# Patient Record
Sex: FEMALE | Race: WHITE | Employment: OTHER | URBAN - METROPOLITAN AREA
[De-identification: names, ages, dates, MRNs, and addresses within clinical notes are randomized per-mention and may not be internally consistent; named-entity substitution may affect disease eponyms.]

---

## 2017-06-14 ENCOUNTER — OFFICE VISIT (OUTPATIENT)
Dept: ONCOLOGY | Age: 72
End: 2017-06-14

## 2017-06-14 VITALS
DIASTOLIC BLOOD PRESSURE: 86 MMHG | TEMPERATURE: 97.7 F | HEART RATE: 89 BPM | RESPIRATION RATE: 18 BRPM | SYSTOLIC BLOOD PRESSURE: 133 MMHG | WEIGHT: 147 LBS | OXYGEN SATURATION: 100 % | BODY MASS INDEX: 24.49 KG/M2 | HEIGHT: 65 IN

## 2017-06-14 DIAGNOSIS — N39.0 URINARY TRACT INFECTION WITHOUT HEMATURIA, SITE UNSPECIFIED: ICD-10-CM

## 2017-06-14 DIAGNOSIS — N85.02 COMPLEX ATYPICAL ENDOMETRIAL HYPERPLASIA: Primary | ICD-10-CM

## 2017-06-14 NOTE — PROGRESS NOTES
[address], 1700 [facility], 2150 [facility]  [address] [facility], 5 [facility]   [phone]  [facility] Covert DO      Patient ID:  Name:  Litzy Beavers  MRN:  [id number]  :  1945/71 y.o. Date:  2017      HISTORY OF PRESENT ILLNESS:  Litzy Beavers is a 73 y.o.   postmenopausal female referred by Dr. Peace Crawford for [facility]. Patient had hysteroscopy, D&C, polypectomy 17 for incidental finding thickened endometrial stripe on TVUS, see report below. Currently on Macrobid for UTI. Patient has a history of kidney stones that began in her 45s. Patient reports abdominal bloating and constipation, daily stool softener use. Denies PMB, vaginal discharge or irritation. No pelvic pain. No history of abnormal paps. Labs:  Pathology: endometrial currettings CAH, see full report in media      Imaging    Ultrasound 2017  Uterus 6 x4x3  Endo thickness 15.14 mm  Lt ovary- 1.2 x0.9 x 1.3complex cyst with calcification    CT 17  Impression:    ABDOMEN:    1. Bilateral nephrolithiasis. There is mild prominence of the bilateral renal collecting systems, right greater than left. However, no delayed nephrogram or perinephric stranding. Calcification is seen at the level of the right mid pelvis, favored to be atherosclerotic within the proximal right internal iliac artery. No conclusive ureteral stone identified. The degree of the collecting system dilatation may be secondary to mild distention of the urinary bladder. Nonetheless, recently passed ureteral stone, particularly on the right cannot be entirely excluded. Correlation with symptomatology and urinalysis recommended. 2. Status post cholecystectomy. 3. Stable sub-centimeters low attenuation focus within the liver, favored to be a nonaggressive etiology. 4. Duodenal diverticulum.     PELVIS:    1. No active inflammatory change within the pelvis. 2. Moderate distention of urinary bladder. Result Narrative   Indication: RT FLANK PAIN; ; .    Comparison: Multiple studies dating back to 3/6/2013. Total Exam DLP: 364.19 mGy-cm.  Dose lowering technique was utilized using automated exposure control adjustment of mA and/or KV according to patient size and/or iterative reconstruction technique. Technique: Following the uneventful administration of 100 cc IV Omnipaque 350 nonionic contrast, 5 mm helical images were obtained from the lung bases through the pelvis. Coronal and sagittal reformats were provided by the CT technologist. No enteric contrast utilized. Findings:    Abdomen:     Lung bases: Clear. Visualized heart: Normal.    Pericardial space: Normal.    Visualized pleural spaces: Normal.    Visualized mediastinum: Normal.    Liver: Normal attenuation and morphology. There is an unchanged subcapsular low-attenuation focus measuring 10 mm within the right hepatic lobe, statistically likely to represent a benign entity. Gallbladder/Biliary system: Status post cholecystectomy. No intrahepatic nor extrahepatic ductal dilatation. Spleen: Normal.    Adrenal Glands: Normal.    Pancreas: Normal.    Kidneys: Symmetric enhancement. Bilateral renal calculi, right greater than left. Largest stone measures 6 mm at the level of the lower pole of the right kidney. There is prominence of the bilateral renal pelves, right greater than left with mild right-sided calyceal dilatation. However, no delayed nephrogram or perinephric inflammation. Mild prominence of the bilateral ureters, right greater than left. The distal ureters both appear normal in caliber. There is a transition point of the right ureter just at the level of the right sacral ala adjacent to the right common iliac bifurcation where there is associated atherosclerotic calcification. No conclusive stone is seen in this region.  No periureteral inflammatory change. Stomach/Small bowel: The stomach demonstrates a normal morphology. Incidental note is made of a diverticulum emanating from the 2nd portion of the duodenum with an associated air-fluid level, measuring at least 3.1 cm. There is no small bowel obstruction. Vasculature: There is mild atherosclerotic disease involving the abdominal aorta and iliac vasculature without evidence of aneurysmal dilatation. Peritoneum: No pneumoperitoneum, ascites or upper abdominal lymphadenopathy. Retroperitoneum: No retroperitoneal nor pelvic lymphadenopathy. No free pelvic fluid. Anterior Abdominal Wall: Left inguinal hernia containing fat. Otherwise, intact. Pelvis:    Urogenital: Urinary bladder is moderately distended. No wall thickening or pericystic inflammatory change. No urinary bladder calculi. Uterus is retroverted within the pelvis. No adnexal masses are seen. Appendix : The appendix is not confidently identified; however, there are no secondary signs to suggest an acute appendicitis. Colon: No acute findings. Cecum extends deep into the right hemipelvis.     Musculoskeletal: No suspicious lytic or blastic lesion of bone            ROS:   Allergic/Immunologic: seasonal allergies  Gastrointestinal: indigestion, heartburn, constipation  Genitourinary: frequent urination, UTI  Endocrine:  Hot flashes  All other systems reviewed and were negative    Patient Active Problem List    Diagnosis Date Noted    Right Flank Pain 03/12/2015    Chest pain at rest 06/19/2013    Calculus of kidney 11/08/2012     Past Medical History:   Diagnosis Date    Arthritis     Asthma     Cataract     Gall stones     GERD (gastroesophageal reflux disease)     Hay fever     Hyperlipidemia     Kidney stones     bi-lateral    Recurrent UTI     Sinusitis       Past Surgical History:   Procedure Laterality Date    HX APPENDECTOMY      HX CATARACT REMOVAL      HX CHOLECYSTECTOMY      HX KNEE ARTHROSCOPY      HX LITHOTRIPSY      HX ORTHOPAEDIC      HX OTHER SURGICAL      cystourethroscopy      OB History     No data available        Social History   Substance Use Topics    Smoking status: Never Smoker    Smokeless tobacco: Never Used    Alcohol use No      Family History   Problem Relation Age of Onset    Cancer Father     Hypertension Father     Heart Disease Mother       Current Outpatient Prescriptions   Medication Sig    estrogen, conjugated,-medroxyPROGESTERone (PREMPRO) 0.45-1.5 mg per tablet Take 1 Tab by mouth daily.  raNITIdine (ZANTAC) 150 mg tablet Take 150 mg by mouth two (2) times a day.  trimethoprim (TRIMPEX) 100 mg tablet Take 1 Tab by mouth two (2) times a day. Indications: KLEBSIELLA URINARY TRACT INFECTION    traMADol (ULTRAM) 50 mg tablet Take 50 mg by mouth every six (6) hours as needed for Pain.  amLODIPine (NORVASC) 2.5 mg tablet Take  by mouth daily.  aspirin delayed-release 81 mg tablet Take  by mouth daily.  butalbital-aspirin-caffeine (FIORINAL) capsule Take 1 Cap by mouth every four (4) hours as needed for Pain.  multivitamin (ONE A DAY) tablet Take 1 Tab by mouth daily.  Cetirizine 10 mg cap Take  by mouth.  fluorouracil (EFUDEX) 5 % chemo cream Apply  to affected area two (2) times a day.  valACYclovir (VALTREX) 500 mg tablet Take  by mouth two (2) times a day.  levocetirizine (XYZAL) 5 mg tablet Take  by mouth daily.  ergocalciferol (VITAMIN D2) 50,000 unit capsule Take 50,000 Units by mouth.  Dexlansoprazole (DEXILANT) 30 mg CpDM Take  by mouth.  dicyclomine (BENTYL) 20 mg tablet Take 20 mg by mouth every six (6) hours.  eszopiclone (LUNESTA) 2 mg tablet Take  by mouth nightly.  clonazePAM (KLONOPIN) 1 mg tablet Take  by mouth two (2) times a day.  simvastatin (ZOCOR) 40 mg tablet Take  by mouth nightly.  fluticasone (FLOVENT DISKUS) 50 mcg/actuation inhaler Take  by inhalation.        No current facility-administered medications for this visit. Allergies   Allergen Reactions    Acetaminophen Unable to Obtain    Amoxicillin Hives    Aspirin Hives    Cephalexin Hives    Ciprofloxacin Other (comments)     GI distress    Clavulanic Acid Unknown (comments)    Codeine Hives    Doxycycline Hives    Eryc [Erythromycin] Other (comments)     Gi ditress    Formaldehyde Unknown (comments)    Gold Sodium Thiomalate Unknown (comments)    Hydrocodone Unknown (comments)    Hydrocodone-Acetaminophen Unknown (comments)    Meperidine Hives    Neomycin Unknown (comments)    Nsaids (Non-Steroidal Anti-Inflammatory Drug) Unknown (comments)    Oxycodone Unknown (comments)    Penicillin G Hives    Penicillin G Potassium In D5w Unknown (comments)    Percocet [Oxycodone-Acetaminophen] Other (comments)     GI    Potassium Unknown (comments)    Prolia [Denosumab] Hives    Sulfa (Sulfonamide Antibiotics) Hives    Sulfanilamide Unknown (comments)          OBJECTIVE:    Physical Exam  VITAL SIGNS: There were no vitals taken for this visit. GENERAL JEFFREY: in no apparent distress and well developed and well nourished   MUSCULOSKEL: no joint tenderness, deformity or swelling   INTEGUMENT:  warm and dry, no rashes or lesions   ABDOMEN . soft, NT, ND, No masses appreciated   EXTREMITIES: extremities normal, atraumatic, no cyanosis or edema   PELVIC: Vulva and vagina appear normal. Bimanual exam reveals normal uterus and adnexa.    RECTAL: deferred   ANISH SURVEY: Cervical, supraclavicular, axillary and inguinal nodes normal.   NEURO: Grossly normal         IMPRESSION/PLAN:  Complex Atypical Hyperplasia   - reviewed pathology and risk of progression to endo ca of approx 30% if left untreated; also discussed at time of hysterectomy there is approximately 40% chance of finding occult malignancy   -given that and her age would recommend proceeding with hysterectomy/BSO with possible staging pending intraop path   -discussed laparoscopic approach and risks, benefits and alternatives of surgery discussed in detail    -informed consent signed and pre and postop instructions reviewed      The total time spent was 60 minutes regarding this patients diagnosis of CAH and >50% of this time was spent counseling and coordinating care    [address] [place] [address]  Gynecologic Oncology  3/38/111466:75 AM

## 2017-06-14 NOTE — MR AVS SNAPSHOT
Visit Information Date & Time Provider Department Dept. Phone Encounter #  
 6/14/2017 11:00 AM Dio Moncada MD [facility] Gynecologic Oncology Specialist at [facility] [phone] [id number] Your Appointments 8/24/2017  8:30 AM  
ESTABLISHED PATIENT with Tanesha Hooper MD  
[facility] ([facility]) Appt Note: 3 mo fu/rec uti  
 [address] [place] [phone]  
  
   
 [address] [place]  
  
    
 5/21/2018  8:45 AM  
ESTABLISHED PATIENT with Tanesha Hooper MD  
[facility] ([facility]) Appt Note: 1 yr f/u for Urinary tract infection with hematuria, site unspecified [address] [facility]  
[phone] Upcoming Health Maintenance Date Due Hepatitis C Screening 1945 DTaP/Tdap/Td series (1 - Tdap) 12/27/1968 FOBT Q 1 YEAR AGE 50-75 12/27/1997 ZOSTER VACCINE AGE 60> 12/27/2007 GLAUCOMA SCREENING Q2Y 12/27/2012 OSTEOPOROSIS SCREENING (DEXA) 12/27/2012 Pneumococcal 65+ Low/Medium Risk (1 of 2 - PCV13) 12/27/2012 MEDICARE YEARLY EXAM 12/27/2012 INFLUENZA AGE 9 TO ADULT 8/1/2017 BREAST CANCER SCRN MAMMOGRAM 11/28/2018 Allergies as of 6/14/2017  Review Complete On: 6/14/2017 By: Dio Moncada MD  
  
 Severity Noted Reaction Type Reactions Acetaminophen  03/02/2017    Unable to Obtain Amoxicillin  10/25/2012    Hives Aspirin  10/25/2012    Hives Cephalexin  10/25/2012    Hives Ciprofloxacin  10/25/2012    Other (comments) GI distress Clavulanic Acid  03/02/2017    Unknown (comments) Codeine  10/25/2012    Hives Doxycycline  03/12/2015    Hives Eryc [Erythromycin]  10/25/2012    Other (comments) Gi ditress Formaldehyde  03/02/2017    Unknown (comments) Gold Sodium Thiomalate  03/02/2017    Unknown (comments) Hydrocodone  03/02/2017    Unknown (comments) Hydrocodone-acetaminophen  03/02/2017    Unknown (comments) Meperidine  10/25/2012    Hives Neomycin  03/02/2017    Unknown (comments) Nsaids (Non-steroidal Anti-inflammatory Drug)  03/05/2013    Unknown (comments) Oxycodone  03/02/2017    Unknown (comments) Penicillin G  10/25/2012    Hives Penicillin G Potassium In D5w  03/02/2017    Unknown (comments) Percocet [Oxycodone-acetaminophen]  10/25/2012    Other (comments) GI Potassium  05/19/2017    Unknown (comments) Prolia [Denosumab]  03/12/2015    Hives Sulfa (Sulfonamide Antibiotics)  10/25/2012    Hives Sulfanilamide  03/02/2017    Unknown (comments) Current Immunizations  Never Reviewed No immunizations on file. Not reviewed this visit You Were Diagnosed With   
  
 Codes Comments Complex atypical endometrial hyperplasia    -  Primary ICD-10-CM: N85.02 
ICD-9-CM: 621.33 Urinary tract infection without hematuria, site unspecified     ICD-10-CM: N39.0 ICD-9-CM: 599.0 Vitals BP Pulse Temp Resp Height(growth percentile) Weight(growth percentile) 133/86 (BP 1 Location: Right arm, BP Patient Position: Sitting) 89 97.7 °F (36.5 °C) (Oral) 18 5' 5\" (1.651 m) 147 lb (66.7 kg) SpO2 BMI OB Status Smoking Status 100% 24.46 kg/m2 Postmenopausal Never Smoker BMI and BSA Data Body Mass Index Body Surface Area  
 24.46 kg/m 2 1.75 m 2 Preferred Pharmacy Pharmacy Name Phone [email or web address] [address] [phone] Your Updated Medication List  
  
   
This list is accurate as of: 6/14/17 12:04 PM.  Always use your most recent med list. amLODIPine 2.5 mg tablet Commonly known as:  Afshan Coop Take  by mouth daily. aspirin delayed-release 81 mg tablet Take  by mouth daily. BENTYL 20 mg tablet Generic drug:  dicyclomine Take 20 mg by mouth every six (6) hours. butalbital-aspirin-caffeine capsule Commonly known as:  Mono Shown Take 1 Cap by mouth every four (4) hours as needed for Pain. Cetirizine 10 mg Cap Take  by mouth. DEXILANT 30 mg capsule Generic drug:  dexlansoprazole Take  by mouth. fluorouracil 5 % chemo cream  
Commonly known as:  EFUDEX Apply  to affected area two (2) times a day. fluticasone 50 mcg/actuation inhaler Commonly known as:  FLOVENT DISKUS Take  by inhalation. KlonoPIN 1 mg tablet Generic drug:  clonazePAM  
Take  by mouth two (2) times a day. LUNESTA 2 mg tablet Generic drug:  eszopiclone Take  by mouth nightly. multivitamin tablet Commonly known as:  ONE A DAY Take 1 Tab by mouth daily. PREMPRO 0.45-1.5 mg per tablet Generic drug:  estrogen (conjugated)-medroxyPROGESTERone Take 1 Tab by mouth daily. trimethoprim 100 mg tablet Commonly known as:  TRIMPEX Take 1 Tab by mouth two (2) times a day. Indications: KLEBSIELLA URINARY TRACT INFECTION  
  
 valACYclovir 500 mg tablet Commonly known as:  VALTREX Take  by mouth two (2) times a day. VITAMIN D2 50,000 unit capsule Generic drug:  ergocalciferol Take 50,000 Units by mouth. XYZAL 5 mg tablet Generic drug:  levocetirizine Take  by mouth daily. ZANTAC 150 mg tablet Generic drug:  raNITIdine Take 150 mg by mouth two (2) times a day. ZOCOR 40 mg tablet Generic drug:  simvastatin Take  by mouth nightly. Introducing [facility]! [employer] introduces [employer] patient portal. Now you can access parts of your medical record, email your doctor's office, and request medication refills online. 1. In your internet browser, go to [email or web address] 2. Click on the First Time User? Click Here link in the Sign In box. You will see the New Member Sign Up page. 3. Enter your Re[employer] Access Code exactly as it appears below. You will not need to use this code after youve completed the sign-up process. If you do not sign up before the expiration date, you must request a new code. · [employer] Access Code: [email or web address] Expires: 8/14/2017  2:22 PM 
 
4. Enter the last four digits of your Social Security Number (xxxx) and Date of Birth (mm/dd/yyyy) as indicated and click Submit. You will be taken to the next sign-up page. 5. Create a [email or web address] ID. This will be your Re[employer]yes login ID and cannot be changed, so think of one that is secure and easy to remember. 6. Create a [employer] password. You can change your password at any time. 7. Enter your Password Reset Question and Answer. This can be used at a later time if you forget your password. 8. Enter your e-mail address. You will receive e-mail notification when new information is available in [address]. 9. Click Sign Up. You can now view and download portions of your medical record. 10. Click the Download Summary menu link to download a portable copy of your medical information. If you have questions, please visit the Frequently Asked Questions section of the [employer] website. Remember, [employer] is NOT to be used for urgent needs. For medical emergencies, dial 911. Now available from your iPhone and Android! Please provide this summary of care documentation to your next provider. Your primary care clinician is listed as Kathi Boyd. If you have any questions after today's visit, please call [phone].

## 2017-06-21 ENCOUNTER — HOSPITAL ENCOUNTER (OUTPATIENT)
Dept: PREADMISSION TESTING | Age: 72
Discharge: HOME | End: 2017-06-21
Payer: MEDICARE

## 2017-06-21 VITALS — BODY MASS INDEX: 23.95 KG/M2 | WEIGHT: 149 LBS | HEIGHT: 66 IN

## 2017-06-21 LAB
ABO + RH BLD: NORMAL
ALBUMIN SERPL BCP-MCNC: 3.6 G/DL (ref 3.4–5)
ALBUMIN/GLOB SERPL: 1.2 {RATIO} (ref 0.8–1.7)
ALP SERPL-CCNC: 129 U/L (ref 45–117)
ALT SERPL-CCNC: 27 U/L (ref 13–56)
ANION GAP BLD CALC-SCNC: 11 MMOL/L (ref 3–18)
AST SERPL W P-5'-P-CCNC: 15 U/L (ref 15–37)
BASOPHILS # BLD AUTO: 0 K/UL (ref 0–0.06)
BASOPHILS # BLD: 1 % (ref 0–2)
BILIRUB SERPL-MCNC: 0.4 MG/DL (ref 0.2–1)
BLOOD GROUP ANTIBODIES SERPL: NORMAL
BUN SERPL-MCNC: 12 MG/DL (ref 7–18)
BUN/CREAT SERPL: 19 (ref 12–20)
CALCIUM SERPL-MCNC: 9.7 MG/DL (ref 8.5–10.1)
CHLORIDE SERPL-SCNC: 106 MMOL/L (ref 100–108)
CO2 SERPL-SCNC: 27 MMOL/L (ref 21–32)
CREAT SERPL-MCNC: 0.64 MG/DL (ref 0.6–1.3)
DIFFERENTIAL METHOD BLD: ABNORMAL
EOSINOPHIL # BLD: 0.3 K/UL (ref 0–0.4)
EOSINOPHIL NFR BLD: 5 % (ref 0–5)
ERYTHROCYTE [DISTWIDTH] IN BLOOD BY AUTOMATED COUNT: 12.3 % (ref 11.6–14.5)
GLOBULIN SER CALC-MCNC: 3 G/DL (ref 2–4)
GLUCOSE SERPL-MCNC: 93 MG/DL (ref 74–99)
HCT VFR BLD AUTO: 37.1 % (ref 35–45)
HGB BLD-MCNC: 13 G/DL (ref 12–16)
LYMPHOCYTES # BLD AUTO: 20 % (ref 21–52)
LYMPHOCYTES # BLD: 1.2 K/UL (ref 0.9–3.6)
MCH RBC QN AUTO: 32.7 PG (ref 24–34)
MCHC RBC AUTO-ENTMCNC: 35 G/DL (ref 31–37)
MCV RBC AUTO: 93.2 FL (ref 74–97)
MONOCYTES # BLD: 0.6 K/UL (ref 0.05–1.2)
MONOCYTES NFR BLD AUTO: 10 % (ref 3–10)
NEUTS SEG # BLD: 3.8 K/UL (ref 1.8–8)
NEUTS SEG NFR BLD AUTO: 64 % (ref 40–73)
PLATELET # BLD AUTO: 249 K/UL (ref 135–420)
PMV BLD AUTO: 9.9 FL (ref 9.2–11.8)
POTASSIUM SERPL-SCNC: 3.6 MMOL/L (ref 3.5–5.5)
PROT SERPL-MCNC: 6.6 G/DL (ref 6.4–8.2)
RBC # BLD AUTO: 3.98 M/UL (ref 4.2–5.3)
SODIUM SERPL-SCNC: 144 MMOL/L (ref 136–145)
SPECIMEN EXP DATE BLD: NORMAL
WBC # BLD AUTO: 5.9 K/UL (ref 4.6–13.2)

## 2017-06-21 PROCEDURE — 86900 BLOOD TYPING SEROLOGIC ABO: CPT | Performed by: OBSTETRICS & GYNECOLOGY

## 2017-06-21 PROCEDURE — 85025 COMPLETE CBC W/AUTO DIFF WBC: CPT | Performed by: OBSTETRICS & GYNECOLOGY

## 2017-06-21 PROCEDURE — 80053 COMPREHEN METABOLIC PANEL: CPT | Performed by: OBSTETRICS & GYNECOLOGY

## 2017-06-21 RX ORDER — SODIUM CHLORIDE, SODIUM LACTATE, POTASSIUM CHLORIDE, CALCIUM CHLORIDE 600; 310; 30; 20 MG/100ML; MG/100ML; MG/100ML; MG/100ML
125 INJECTION, SOLUTION INTRAVENOUS CONTINUOUS
Status: CANCELLED | OUTPATIENT
Start: 2017-06-21

## 2017-06-21 RX ORDER — HEPARIN SODIUM 5000 [USP'U]/ML
5000 INJECTION, SOLUTION INTRAVENOUS; SUBCUTANEOUS ONCE
Status: CANCELLED | OUTPATIENT
Start: 2017-06-21 | End: 2017-06-21

## 2017-06-21 RX ORDER — FAMOTIDINE 20 MG/1
20 TABLET, FILM COATED ORAL AS NEEDED
COMMUNITY
End: 2017-08-24

## 2017-06-21 RX ORDER — MONTELUKAST SODIUM 10 MG/1
10 TABLET ORAL
COMMUNITY
End: 2018-03-16

## 2017-06-21 NOTE — PERIOP NOTES
Patient with multiple allergies. Dr. Jelani Carpenter office Danni Stovall) notified of neomycin allergy and Gentamycin contraindication. Misael Rubio will get back to us and call [phone].   EKG requested from [facility],

## 2017-06-22 ENCOUNTER — TELEPHONE (OUTPATIENT)
Dept: ONCOLOGY | Age: 72
End: 2017-06-22

## 2017-06-23 ENCOUNTER — TELEPHONE (OUTPATIENT)
Dept: ONCOLOGY | Age: 72
End: 2017-06-23

## 2017-06-23 RX ORDER — GENTAMICIN SULFATE 60 MG/50ML
120 INJECTION, SOLUTION INTRAVENOUS ONCE
Status: CANCELLED | OUTPATIENT
Start: 2017-06-23 | End: 2017-06-23

## 2017-06-23 NOTE — TELEPHONE ENCOUNTER
Returned page. Recent UTIs. Dr. Justine Fortune office sent urine culture. Feel fine today, yesterday didn't feel good, finished Macrobid finished Tuesday 6/20. Chills, no fever. Urine is clear today \"in the bowl\". Verified with Dr. Alanna parada to proceed with surgery Monday as scheduled. Pt encouraged to return call with any new or worsening sx's.

## 2017-06-25 ENCOUNTER — ANESTHESIA EVENT (OUTPATIENT)
Dept: SURGERY | Age: 72
End: 2017-06-25
Payer: MEDICARE

## 2017-06-26 ENCOUNTER — ANESTHESIA (OUTPATIENT)
Dept: SURGERY | Age: 72
End: 2017-06-26
Payer: MEDICARE

## 2017-06-26 ENCOUNTER — HOSPITAL ENCOUNTER (OUTPATIENT)
Age: 72
Setting detail: OBSERVATION
Discharge: HOME | End: 2017-06-27
Attending: OBSTETRICS & GYNECOLOGY | Admitting: OBSTETRICS & GYNECOLOGY
Payer: MEDICARE

## 2017-06-26 PROCEDURE — 77030020782 HC GWN BAIR PAWS FLX 3M -B: Performed by: OBSTETRICS & GYNECOLOGY

## 2017-06-26 PROCEDURE — 77030002996 HC SUT SLK J&J -A: Performed by: OBSTETRICS & GYNECOLOGY

## 2017-06-26 PROCEDURE — 76010000153 HC OR TIME 1.5 TO 2 HR: Performed by: OBSTETRICS & GYNECOLOGY

## 2017-06-26 PROCEDURE — 74011250636 HC RX REV CODE- 250/636: Performed by: OBSTETRICS & GYNECOLOGY

## 2017-06-26 PROCEDURE — 77030037241 HC PRT ACC BLDLSS AIRSEAL CNMD -B: Performed by: OBSTETRICS & GYNECOLOGY

## 2017-06-26 PROCEDURE — 74011250637 HC RX REV CODE- 250/637: Performed by: OBSTETRICS & GYNECOLOGY

## 2017-06-26 PROCEDURE — 74011000258 HC RX REV CODE- 258: Performed by: OBSTETRICS & GYNECOLOGY

## 2017-06-26 PROCEDURE — 77010033678 HC OXYGEN DAILY

## 2017-06-26 PROCEDURE — 74011000250 HC RX REV CODE- 250

## 2017-06-26 PROCEDURE — 74011250637 HC RX REV CODE- 250/637: Performed by: SPECIALIST

## 2017-06-26 PROCEDURE — 77030008683 HC TU ET CUF COVD -A: Performed by: NURSE ANESTHETIST, CERTIFIED REGISTERED

## 2017-06-26 PROCEDURE — 99218 HC RM OBSERVATION: CPT

## 2017-06-26 PROCEDURE — 74011250636 HC RX REV CODE- 250/636: Performed by: SPECIALIST

## 2017-06-26 PROCEDURE — 77030002966 HC SUT PDS J&J -A: Performed by: OBSTETRICS & GYNECOLOGY

## 2017-06-26 PROCEDURE — 77030016151 HC PROTCTR LNS DFOG COVD -B: Performed by: OBSTETRICS & GYNECOLOGY

## 2017-06-26 PROCEDURE — 74011250636 HC RX REV CODE- 250/636

## 2017-06-26 PROCEDURE — 77030031139 HC SUT VCRL2 J&J -A: Performed by: OBSTETRICS & GYNECOLOGY

## 2017-06-26 PROCEDURE — 77030029655 HC RUMI KOH EFF SYS F HARM COOP -B: Performed by: OBSTETRICS & GYNECOLOGY

## 2017-06-26 PROCEDURE — 88307 TISSUE EXAM BY PATHOLOGIST: CPT | Performed by: OBSTETRICS & GYNECOLOGY

## 2017-06-26 PROCEDURE — 88112 CYTOPATH CELL ENHANCE TECH: CPT | Performed by: OBSTETRICS & GYNECOLOGY

## 2017-06-26 PROCEDURE — 77030018835 HC SOL IRR LR ICUM -A: Performed by: OBSTETRICS & GYNECOLOGY

## 2017-06-26 PROCEDURE — 77030002935 HC SUT MCRYL J&J -C: Performed by: OBSTETRICS & GYNECOLOGY

## 2017-06-26 PROCEDURE — 88331 PATH CONSLTJ SURG 1 BLK 1SPC: CPT | Performed by: OBSTETRICS & GYNECOLOGY

## 2017-06-26 PROCEDURE — 77030012799 HC TRCR GELPRT BLN AMR -B: Performed by: OBSTETRICS & GYNECOLOGY

## 2017-06-26 PROCEDURE — 76060000034 HC ANESTHESIA 1.5 TO 2 HR: Performed by: OBSTETRICS & GYNECOLOGY

## 2017-06-26 PROCEDURE — 77030011640 HC PAD GRND REM COVD -A: Performed by: OBSTETRICS & GYNECOLOGY

## 2017-06-26 PROCEDURE — 74011000250 HC RX REV CODE- 250: Performed by: OBSTETRICS & GYNECOLOGY

## 2017-06-26 PROCEDURE — 77030008603 HC TRCR ENDOSC EPATH J&J -C: Performed by: OBSTETRICS & GYNECOLOGY

## 2017-06-26 PROCEDURE — 76210000006 HC OR PH I REC 0.5 TO 1 HR: Performed by: OBSTETRICS & GYNECOLOGY

## 2017-06-26 PROCEDURE — 77030008602 HC TRCR ENDOSC EPATH J&J -B: Performed by: OBSTETRICS & GYNECOLOGY

## 2017-06-26 PROCEDURE — 77030005521 HC CATH URETH FOL38 BARD -B: Performed by: OBSTETRICS & GYNECOLOGY

## 2017-06-26 PROCEDURE — 77030008477 HC STYL SATN SLP COVD -A: Performed by: NURSE ANESTHETIST, CERTIFIED REGISTERED

## 2017-06-26 PROCEDURE — 77030033639 HC SHR ENDO COAG HARM 36 J&J -E: Performed by: OBSTETRICS & GYNECOLOGY

## 2017-06-26 PROCEDURE — 77030014063 HC TIP MANIP UTER COOP -B: Performed by: OBSTETRICS & GYNECOLOGY

## 2017-06-26 RX ORDER — ENOXAPARIN SODIUM 100 MG/ML
40 INJECTION SUBCUTANEOUS EVERY 24 HOURS
Status: DISCONTINUED | OUTPATIENT
Start: 2017-06-27 | End: 2017-06-27 | Stop reason: HOSPADM

## 2017-06-26 RX ORDER — ONDANSETRON 2 MG/ML
4 INJECTION INTRAMUSCULAR; INTRAVENOUS ONCE
Status: DISCONTINUED | OUTPATIENT
Start: 2017-06-26 | End: 2017-06-26 | Stop reason: HOSPADM

## 2017-06-26 RX ORDER — CLONAZEPAM 0.5 MG/1
1 TABLET ORAL DAILY
Status: DISCONTINUED | OUTPATIENT
Start: 2017-06-27 | End: 2017-06-27 | Stop reason: HOSPADM

## 2017-06-26 RX ORDER — MAGNESIUM SULFATE 100 %
4 CRYSTALS MISCELLANEOUS AS NEEDED
Status: DISCONTINUED | OUTPATIENT
Start: 2017-06-26 | End: 2017-06-26 | Stop reason: HOSPADM

## 2017-06-26 RX ORDER — FENTANYL CITRATE 50 UG/ML
INJECTION, SOLUTION INTRAMUSCULAR; INTRAVENOUS AS NEEDED
Status: DISCONTINUED | OUTPATIENT
Start: 2017-06-26 | End: 2017-06-26 | Stop reason: HOSPADM

## 2017-06-26 RX ORDER — SCOLOPAMINE TRANSDERMAL SYSTEM 1 MG/1
1.5 PATCH, EXTENDED RELEASE TRANSDERMAL ONCE
Status: DISCONTINUED | OUTPATIENT
Start: 2017-06-26 | End: 2017-06-27 | Stop reason: HOSPADM

## 2017-06-26 RX ORDER — SODIUM CHLORIDE 0.9 % (FLUSH) 0.9 %
5-10 SYRINGE (ML) INJECTION EVERY 8 HOURS
Status: DISCONTINUED | OUTPATIENT
Start: 2017-06-26 | End: 2017-06-27 | Stop reason: HOSPADM

## 2017-06-26 RX ORDER — ONDANSETRON 2 MG/ML
4 INJECTION INTRAMUSCULAR; INTRAVENOUS
Status: DISCONTINUED | OUTPATIENT
Start: 2017-06-26 | End: 2017-06-27 | Stop reason: HOSPADM

## 2017-06-26 RX ORDER — NALOXONE HYDROCHLORIDE 0.4 MG/ML
0.4 INJECTION, SOLUTION INTRAMUSCULAR; INTRAVENOUS; SUBCUTANEOUS AS NEEDED
Status: DISCONTINUED | OUTPATIENT
Start: 2017-06-26 | End: 2017-06-27 | Stop reason: HOSPADM

## 2017-06-26 RX ORDER — AMLODIPINE BESYLATE 2.5 MG/1
2.5 TABLET ORAL
Status: DISCONTINUED | OUTPATIENT
Start: 2017-06-26 | End: 2017-06-27 | Stop reason: HOSPADM

## 2017-06-26 RX ORDER — DIPHENHYDRAMINE HCL 25 MG
25 CAPSULE ORAL
Status: DISCONTINUED | OUTPATIENT
Start: 2017-06-26 | End: 2017-06-27 | Stop reason: HOSPADM

## 2017-06-26 RX ORDER — DEXLANSOPRAZOLE 30 MG/1
30 CAPSULE, DELAYED RELEASE ORAL DAILY
Status: DISCONTINUED | OUTPATIENT
Start: 2017-06-27 | End: 2017-06-26 | Stop reason: CLARIF

## 2017-06-26 RX ORDER — ONDANSETRON 2 MG/ML
INJECTION INTRAMUSCULAR; INTRAVENOUS AS NEEDED
Status: DISCONTINUED | OUTPATIENT
Start: 2017-06-26 | End: 2017-06-26 | Stop reason: HOSPADM

## 2017-06-26 RX ORDER — GENTAMICIN SULFATE 60 MG/50ML
120 INJECTION, SOLUTION INTRAVENOUS EVERY 8 HOURS
Status: DISCONTINUED | OUTPATIENT
Start: 2017-06-26 | End: 2017-06-26 | Stop reason: ALTCHOICE

## 2017-06-26 RX ORDER — MONTELUKAST SODIUM 10 MG/1
10 TABLET ORAL DAILY
Status: DISCONTINUED | OUTPATIENT
Start: 2017-06-27 | End: 2017-06-27 | Stop reason: HOSPADM

## 2017-06-26 RX ORDER — HYDROMORPHONE HYDROCHLORIDE 2 MG/1
2-4 TABLET ORAL
Status: DISCONTINUED | OUTPATIENT
Start: 2017-06-26 | End: 2017-06-27 | Stop reason: HOSPADM

## 2017-06-26 RX ORDER — NEOSTIGMINE METHYLSULFATE 5 MG/5 ML
SYRINGE (ML) INTRAVENOUS AS NEEDED
Status: DISCONTINUED | OUTPATIENT
Start: 2017-06-26 | End: 2017-06-26 | Stop reason: HOSPADM

## 2017-06-26 RX ORDER — BUPIVACAINE HYDROCHLORIDE AND EPINEPHRINE 5; 5 MG/ML; UG/ML
INJECTION, SOLUTION PERINEURAL AS NEEDED
Status: DISCONTINUED | OUTPATIENT
Start: 2017-06-26 | End: 2017-06-26 | Stop reason: HOSPADM

## 2017-06-26 RX ORDER — PANTOPRAZOLE SODIUM 40 MG/1
40 TABLET, DELAYED RELEASE ORAL
Status: DISCONTINUED | OUTPATIENT
Start: 2017-06-27 | End: 2017-06-27 | Stop reason: HOSPADM

## 2017-06-26 RX ORDER — NALOXONE HYDROCHLORIDE 0.4 MG/ML
0.1 INJECTION, SOLUTION INTRAMUSCULAR; INTRAVENOUS; SUBCUTANEOUS AS NEEDED
Status: DISCONTINUED | OUTPATIENT
Start: 2017-06-26 | End: 2017-06-26 | Stop reason: HOSPADM

## 2017-06-26 RX ORDER — LORATADINE 10 MG/1
10 TABLET ORAL DAILY
Status: DISCONTINUED | OUTPATIENT
Start: 2017-06-27 | End: 2017-06-27 | Stop reason: HOSPADM

## 2017-06-26 RX ORDER — PROPOFOL 10 MG/ML
INJECTION, EMULSION INTRAVENOUS AS NEEDED
Status: DISCONTINUED | OUTPATIENT
Start: 2017-06-26 | End: 2017-06-26 | Stop reason: HOSPADM

## 2017-06-26 RX ORDER — SODIUM CHLORIDE 9 MG/ML
125 INJECTION, SOLUTION INTRAVENOUS CONTINUOUS
Status: DISCONTINUED | OUTPATIENT
Start: 2017-06-26 | End: 2017-06-26 | Stop reason: HOSPADM

## 2017-06-26 RX ORDER — MECLIZINE HCL 12.5 MG 12.5 MG/1
25 TABLET ORAL ONCE
Status: DISCONTINUED | OUTPATIENT
Start: 2017-06-26 | End: 2017-06-26

## 2017-06-26 RX ORDER — GLYCOPYRROLATE 0.2 MG/ML
INJECTION INTRAMUSCULAR; INTRAVENOUS AS NEEDED
Status: DISCONTINUED | OUTPATIENT
Start: 2017-06-26 | End: 2017-06-26 | Stop reason: HOSPADM

## 2017-06-26 RX ORDER — ROCURONIUM BROMIDE 10 MG/ML
INJECTION, SOLUTION INTRAVENOUS AS NEEDED
Status: DISCONTINUED | OUTPATIENT
Start: 2017-06-26 | End: 2017-06-26 | Stop reason: HOSPADM

## 2017-06-26 RX ORDER — ZOLPIDEM TARTRATE 5 MG/1
5 TABLET ORAL
Status: DISCONTINUED | OUTPATIENT
Start: 2017-06-26 | End: 2017-06-27 | Stop reason: HOSPADM

## 2017-06-26 RX ORDER — DEXAMETHASONE SODIUM PHOSPHATE 4 MG/ML
8 INJECTION, SOLUTION INTRA-ARTICULAR; INTRALESIONAL; INTRAMUSCULAR; INTRAVENOUS; SOFT TISSUE ONCE
Status: COMPLETED | OUTPATIENT
Start: 2017-06-26 | End: 2017-06-26

## 2017-06-26 RX ORDER — HEPARIN SODIUM 5000 [USP'U]/ML
5000 INJECTION, SOLUTION INTRAVENOUS; SUBCUTANEOUS ONCE
Status: COMPLETED | OUTPATIENT
Start: 2017-06-26 | End: 2017-06-26

## 2017-06-26 RX ORDER — OXYCODONE HYDROCHLORIDE 5 MG/1
5 TABLET ORAL
Status: DISCONTINUED | OUTPATIENT
Start: 2017-06-26 | End: 2017-06-26

## 2017-06-26 RX ORDER — SODIUM CHLORIDE 0.9 % (FLUSH) 0.9 %
5-10 SYRINGE (ML) INJECTION AS NEEDED
Status: DISCONTINUED | OUTPATIENT
Start: 2017-06-26 | End: 2017-06-27 | Stop reason: HOSPADM

## 2017-06-26 RX ORDER — SIMVASTATIN 40 MG/1
40 TABLET, FILM COATED ORAL
Status: DISCONTINUED | OUTPATIENT
Start: 2017-06-26 | End: 2017-06-27 | Stop reason: HOSPADM

## 2017-06-26 RX ORDER — SODIUM CHLORIDE 0.9 % (FLUSH) 0.9 %
5-10 SYRINGE (ML) INJECTION AS NEEDED
Status: DISCONTINUED | OUTPATIENT
Start: 2017-06-26 | End: 2017-06-26 | Stop reason: HOSPADM

## 2017-06-26 RX ORDER — DEXTROSE 50 % IN WATER (D50W) INTRAVENOUS SYRINGE
25-50 AS NEEDED
Status: DISCONTINUED | OUTPATIENT
Start: 2017-06-26 | End: 2017-06-26 | Stop reason: HOSPADM

## 2017-06-26 RX ORDER — FENTANYL CITRATE 50 UG/ML
25 INJECTION, SOLUTION INTRAMUSCULAR; INTRAVENOUS AS NEEDED
Status: DISCONTINUED | OUTPATIENT
Start: 2017-06-26 | End: 2017-06-26 | Stop reason: HOSPADM

## 2017-06-26 RX ORDER — SODIUM CHLORIDE, SODIUM LACTATE, POTASSIUM CHLORIDE, CALCIUM CHLORIDE 600; 310; 30; 20 MG/100ML; MG/100ML; MG/100ML; MG/100ML
125 INJECTION, SOLUTION INTRAVENOUS CONTINUOUS
Status: DISCONTINUED | OUTPATIENT
Start: 2017-06-26 | End: 2017-06-27

## 2017-06-26 RX ORDER — DICYCLOMINE HYDROCHLORIDE 10 MG/1
20 CAPSULE ORAL 2 TIMES DAILY
Status: DISCONTINUED | OUTPATIENT
Start: 2017-06-26 | End: 2017-06-27 | Stop reason: HOSPADM

## 2017-06-26 RX ORDER — MIDAZOLAM HYDROCHLORIDE 1 MG/ML
INJECTION, SOLUTION INTRAMUSCULAR; INTRAVENOUS AS NEEDED
Status: DISCONTINUED | OUTPATIENT
Start: 2017-06-26 | End: 2017-06-26 | Stop reason: HOSPADM

## 2017-06-26 RX ORDER — LIDOCAINE HYDROCHLORIDE 20 MG/ML
INJECTION, SOLUTION EPIDURAL; INFILTRATION; INTRACAUDAL; PERINEURAL AS NEEDED
Status: DISCONTINUED | OUTPATIENT
Start: 2017-06-26 | End: 2017-06-26 | Stop reason: HOSPADM

## 2017-06-26 RX ADMIN — Medication 3 MG: at 09:50

## 2017-06-26 RX ADMIN — DEXAMETHASONE SODIUM PHOSPHATE 8 MG: 4 INJECTION, SOLUTION INTRAMUSCULAR; INTRAVENOUS at 08:07

## 2017-06-26 RX ADMIN — SODIUM CHLORIDE, SODIUM LACTATE, POTASSIUM CHLORIDE, AND CALCIUM CHLORIDE 1000 ML: 600; 310; 30; 20 INJECTION, SOLUTION INTRAVENOUS at 07:36

## 2017-06-26 RX ADMIN — HEPARIN SODIUM 5000 UNITS: 5000 INJECTION, SOLUTION INTRAVENOUS; SUBCUTANEOUS at 07:36

## 2017-06-26 RX ADMIN — DICYCLOMINE HYDROCHLORIDE 20 MG: 10 CAPSULE ORAL at 21:07

## 2017-06-26 RX ADMIN — ONDANSETRON 4 MG: 2 INJECTION INTRAMUSCULAR; INTRAVENOUS at 09:47

## 2017-06-26 RX ADMIN — PROPOFOL 150 MG: 10 INJECTION, EMULSION INTRAVENOUS at 08:21

## 2017-06-26 RX ADMIN — FENTANYL CITRATE 25 MCG: 50 INJECTION, SOLUTION INTRAMUSCULAR; INTRAVENOUS at 10:41

## 2017-06-26 RX ADMIN — GLYCOPYRROLATE 0.6 MG: 0.2 INJECTION INTRAMUSCULAR; INTRAVENOUS at 09:50

## 2017-06-26 RX ADMIN — ONDANSETRON 4 MG: 2 INJECTION INTRAMUSCULAR; INTRAVENOUS at 15:37

## 2017-06-26 RX ADMIN — SIMVASTATIN 40 MG: 40 TABLET, FILM COATED ORAL at 21:06

## 2017-06-26 RX ADMIN — LIDOCAINE HYDROCHLORIDE 100 MG: 20 INJECTION, SOLUTION EPIDURAL; INFILTRATION; INTRACAUDAL; PERINEURAL at 08:21

## 2017-06-26 RX ADMIN — FENTANYL CITRATE 50 MCG: 50 INJECTION, SOLUTION INTRAMUSCULAR; INTRAVENOUS at 10:11

## 2017-06-26 RX ADMIN — SODIUM CHLORIDE, SODIUM LACTATE, POTASSIUM CHLORIDE, AND CALCIUM CHLORIDE 125 ML/HR: 600; 310; 30; 20 INJECTION, SOLUTION INTRAVENOUS at 16:17

## 2017-06-26 RX ADMIN — HYDROMORPHONE HYDROCHLORIDE 4 MG: 2 TABLET ORAL at 18:43

## 2017-06-26 RX ADMIN — FENTANYL CITRATE 100 MCG: 50 INJECTION, SOLUTION INTRAMUSCULAR; INTRAVENOUS at 08:21

## 2017-06-26 RX ADMIN — ROCURONIUM BROMIDE 50 MG: 10 INJECTION, SOLUTION INTRAVENOUS at 08:21

## 2017-06-26 RX ADMIN — MIDAZOLAM HYDROCHLORIDE 2 MG: 1 INJECTION, SOLUTION INTRAMUSCULAR; INTRAVENOUS at 08:14

## 2017-06-26 RX ADMIN — FENTANYL CITRATE 25 MCG: 50 INJECTION, SOLUTION INTRAMUSCULAR; INTRAVENOUS at 10:46

## 2017-06-26 RX ADMIN — CLINDAMYCIN PHOSPHATE 900 MG: 150 INJECTION, SOLUTION INTRAVENOUS at 16:34

## 2017-06-26 RX ADMIN — ZOLPIDEM TARTRATE 5 MG: 5 TABLET ORAL at 21:06

## 2017-06-26 RX ADMIN — SODIUM CHLORIDE 900 MG: 900 INJECTION, SOLUTION INTRAVENOUS at 08:34

## 2017-06-26 RX ADMIN — AMLODIPINE BESYLATE 2.5 MG: 2.5 TABLET ORAL at 21:06

## 2017-06-26 RX ADMIN — SODIUM CHLORIDE, SODIUM LACTATE, POTASSIUM CHLORIDE, AND CALCIUM CHLORIDE: 600; 310; 30; 20 INJECTION, SOLUTION INTRAVENOUS at 08:12

## 2017-06-26 RX ADMIN — GENTAMICIN SULFATE 305 MG: 40 INJECTION, SOLUTION INTRAMUSCULAR; INTRAVENOUS at 08:58

## 2017-06-26 RX ADMIN — FENTANYL CITRATE 50 MCG: 50 INJECTION, SOLUTION INTRAMUSCULAR; INTRAVENOUS at 09:55

## 2017-06-26 RX ADMIN — Medication 10 ML: at 21:09

## 2017-06-26 RX ADMIN — SODIUM CHLORIDE, SODIUM LACTATE, POTASSIUM CHLORIDE, AND CALCIUM CHLORIDE 125 ML/HR: 600; 310; 30; 20 INJECTION, SOLUTION INTRAVENOUS at 07:36

## 2017-06-26 NOTE — PERIOP NOTES
TRANSFER - OUT REPORT:    Verbal report given to [facility], RN on Migue Villagran  being transferred to [facility] (unit) for routine post - op       Report consisted of patients Situation, Background, Assessment and   Recommendations(SBAR). Information from the following report(s) SBAR, Intake/Output and MAR was reviewed with the receiving nurse. Lines:   Peripheral IV 06/26/17 Right Hand (Active)   Site Assessment Clean, dry, & intact 6/26/2017 10:22 AM   Phlebitis Assessment 0 6/26/2017 10:22 AM   Infiltration Assessment 0 6/26/2017 10:22 AM   Dressing Status Clean, dry, & intact 6/26/2017 10:22 AM   Dressing Type Tape;Transparent 6/26/2017 10:22 AM   Hub Color/Line Status Infusing 6/26/2017 10:22 AM        Opportunity for questions and clarification was provided.       Patient transported with:   Registered Nurse

## 2017-06-26 NOTE — ANESTHESIA POSTPROCEDURE EVALUATION
Post-Anesthesia Evaluation and Assessment    Cardiovascular Function/Vital Signs  Visit Vitals    /65    Pulse 75    Temp 36.1 °C (97 °F)    Resp 11    Ht 5' 5\" (1.651 m)    Wt 67.1 kg (148 lb)    SpO2 100%    BMI 24.63 kg/m2       Patient is status post Procedure(s):  TOTAL LAPAROSCOPIC HYSTERECTOMY,BILATERAL SALPINGO OOPHORECTOMY, LYSIS OF ADHESIONS, FROZEN PATHOLOGIC EVALUATION. Nausea/Vomiting: Controlled. Postoperative hydration reviewed and adequate. Pain:  Pain Scale 1: Numeric (0 - 10) (06/26/17 1057)  Pain Intensity 1: 0 (06/26/17 1057)   Managed. Neurological Status:   Neuro (WDL): Within Defined Limits (06/26/17 1049)   At baseline. Mental Status and Level of Consciousness: Arousable. Pulmonary Status:   O2 Device: Nasal cannula (06/26/17 1054)   Adequate oxygenation and airway patent. Complications related to anesthesia: None    Post-anesthesia assessment completed. No concerns. Patient has met all discharge requirements.     Signed By: Herminia Shah MD    June 26, 2017

## 2017-06-26 NOTE — H&P (VIEW-ONLY)
[address] [facility] [place] [facility]  [address] [place] [address], [address], [address] [facility]  [address] [facility], [place], [address]  [place], [address]  [phone]  Angela Maciel DO      Patient ID:  Name:  Tyler Junior  MRN:  [id number]  :  1945/71 y.o. Date:  2017      HISTORY OF PRESENT ILLNESS:  Tyler Junior is a 73 y.o.   postmenopausal female referred by Dr. Edwardo Frederick for [facility]. Patient had hysteroscopy, D&C, polypectomy 17 for incidental finding thickened endometrial stripe on TVUS, see report below. Currently on Macrobid for UTI. Patient has a history of kidney stones that began in her 45s. Patient reports abdominal bloating and constipation, daily stool softener use. Denies PMB, vaginal discharge or irritation. No pelvic pain. No history of abnormal paps. Labs:  Pathology: endometrial currettings CAH, see full report in media      Imaging    Ultrasound 2017  Uterus 6 x4x3  Endo thickness 15.14 mm  Lt ovary- 1.2 x0.9 x 1.3complex cyst with calcification    CT 17  Impression:    ABDOMEN:    1. Bilateral nephrolithiasis. There is mild prominence of the bilateral renal collecting systems, right greater than left. However, no delayed nephrogram or perinephric stranding. Calcification is seen at the level of the right mid pelvis, favored to be atherosclerotic within the proximal right internal iliac artery. No conclusive ureteral stone identified. The degree of the collecting system dilatation may be secondary to mild distention of the urinary bladder. Nonetheless, recently passed ureteral stone, particularly on the right cannot be entirely excluded. Correlation with symptomatology and urinalysis recommended. 2. Status post cholecystectomy. 3. Stable sub-centimeters low attenuation focus within the liver, favored to be a nonaggressive etiology. 4. Duodenal diverticulum.     PELVIS:    1. No active inflammatory change within the pelvis. 2. Moderate distention of urinary bladder. Result Narrative   Indication: RT FLANK PAIN; ; .    Comparison: Multiple studies dating back to 3/6/2013. Total Exam DLP: 364.19 mGy-cm.  Dose lowering technique was utilized using automated exposure control adjustment of mA and/or KV according to patient size and/or iterative reconstruction technique. Technique: Following the uneventful administration of 100 cc IV Omnipaque 350 nonionic contrast, 5 mm helical images were obtained from the lung bases through the pelvis. Coronal and sagittal reformats were provided by the CT technologist. No enteric contrast utilized. Findings:    Abdomen:     Lung bases: Clear. Visualized heart: Normal.    Pericardial space: Normal.    Visualized pleural spaces: Normal.    Visualized mediastinum: Normal.    Liver: Normal attenuation and morphology. There is an unchanged subcapsular low-attenuation focus measuring 10 mm within the right hepatic lobe, statistically likely to represent a benign entity. Gallbladder/Biliary system: Status post cholecystectomy. No intrahepatic nor extrahepatic ductal dilatation. Spleen: Normal.    Adrenal Glands: Normal.    Pancreas: Normal.    Kidneys: Symmetric enhancement. Bilateral renal calculi, right greater than left. Largest stone measures 6 mm at the level of the lower pole of the right kidney. There is prominence of the bilateral renal pelves, right greater than left with mild right-sided calyceal dilatation. However, no delayed nephrogram or perinephric inflammation. Mild prominence of the bilateral ureters, right greater than left. The distal ureters both appear normal in caliber. There is a transition point of the right ureter just at the level of the right sacral ala adjacent to the right common iliac bifurcation where there is associated atherosclerotic calcification. No conclusive stone is seen in this region.  No periureteral inflammatory change. Stomach/Small bowel: The stomach demonstrates a normal morphology. Incidental note is made of a diverticulum emanating from the 2nd portion of the duodenum with an associated air-fluid level, measuring at least 3.1 cm. There is no small bowel obstruction. Vasculature: There is mild atherosclerotic disease involving the abdominal aorta and iliac vasculature without evidence of aneurysmal dilatation. Peritoneum: No pneumoperitoneum, ascites or upper abdominal lymphadenopathy. Retroperitoneum: No retroperitoneal nor pelvic lymphadenopathy. No free pelvic fluid. Anterior Abdominal Wall: Left inguinal hernia containing fat. Otherwise, intact. Pelvis:    Urogenital: Urinary bladder is moderately distended. No wall thickening or pericystic inflammatory change. No urinary bladder calculi. Uterus is retroverted within the pelvis. No adnexal masses are seen. Appendix : The appendix is not confidently identified; however, there are no secondary signs to suggest an acute appendicitis. Colon: No acute findings. Cecum extends deep into the right hemipelvis.     Musculoskeletal: No suspicious lytic or blastic lesion of bone            ROS:   Allergic/Immunologic: seasonal allergies  Gastrointestinal: indigestion, heartburn, constipation  Genitourinary: frequent urination, UTI  Endocrine:  Hot flashes  All other systems reviewed and were negative    Patient Active Problem List    Diagnosis Date Noted    Right Flank Pain 03/12/2015    Chest pain at rest 06/19/2013    Calculus of kidney 11/08/2012     Past Medical History:   Diagnosis Date    Arthritis     Asthma     Cataract     Gall stones     GERD (gastroesophageal reflux disease)     Hay fever     Hyperlipidemia     Kidney stones     bi-lateral    Recurrent UTI     Sinusitis       Past Surgical History:   Procedure Laterality Date    HX APPENDECTOMY      HX CATARACT REMOVAL      HX CHOLECYSTECTOMY      HX KNEE ARTHROSCOPY      HX LITHOTRIPSY      HX ORTHOPAEDIC      HX OTHER SURGICAL      cystourethroscopy      OB History     No data available        Social History   Substance Use Topics    Smoking status: Never Smoker    Smokeless tobacco: Never Used    Alcohol use No      Family History   Problem Relation Age of Onset    Cancer Father     Hypertension Father     Heart Disease Mother       Current Outpatient Prescriptions   Medication Sig    estrogen, conjugated,-medroxyPROGESTERone (PREMPRO) 0.45-1.5 mg per tablet Take 1 Tab by mouth daily.  raNITIdine (ZANTAC) 150 mg tablet Take 150 mg by mouth two (2) times a day.  trimethoprim (TRIMPEX) 100 mg tablet Take 1 Tab by mouth two (2) times a day. Indications: KLEBSIELLA URINARY TRACT INFECTION    traMADol (ULTRAM) 50 mg tablet Take 50 mg by mouth every six (6) hours as needed for Pain.  amLODIPine (NORVASC) 2.5 mg tablet Take  by mouth daily.  aspirin delayed-release 81 mg tablet Take  by mouth daily.  butalbital-aspirin-caffeine (FIORINAL) capsule Take 1 Cap by mouth every four (4) hours as needed for Pain.  multivitamin (ONE A DAY) tablet Take 1 Tab by mouth daily.  Cetirizine 10 mg cap Take  by mouth.  fluorouracil (EFUDEX) 5 % chemo cream Apply  to affected area two (2) times a day.  valACYclovir (VALTREX) 500 mg tablet Take  by mouth two (2) times a day.  levocetirizine (XYZAL) 5 mg tablet Take  by mouth daily.  ergocalciferol (VITAMIN D2) 50,000 unit capsule Take 50,000 Units by mouth.  Dexlansoprazole (DEXILANT) 30 mg CpDM Take  by mouth.  dicyclomine (BENTYL) 20 mg tablet Take 20 mg by mouth every six (6) hours.  eszopiclone (LUNESTA) 2 mg tablet Take  by mouth nightly.  clonazePAM (KLONOPIN) 1 mg tablet Take  by mouth two (2) times a day.  simvastatin (ZOCOR) 40 mg tablet Take  by mouth nightly.  fluticasone (FLOVENT DISKUS) 50 mcg/actuation inhaler Take  by inhalation.        No current facility-administered medications for this visit. Allergies   Allergen Reactions    Acetaminophen Unable to Obtain    Amoxicillin Hives    Aspirin Hives    Cephalexin Hives    Ciprofloxacin Other (comments)     GI distress    Clavulanic Acid Unknown (comments)    Codeine Hives    Doxycycline Hives    Eryc [Erythromycin] Other (comments)     Gi ditress    Formaldehyde Unknown (comments)    Gold Sodium Thiomalate Unknown (comments)    Hydrocodone Unknown (comments)    Hydrocodone-Acetaminophen Unknown (comments)    Meperidine Hives    Neomycin Unknown (comments)    Nsaids (Non-Steroidal Anti-Inflammatory Drug) Unknown (comments)    Oxycodone Unknown (comments)    Penicillin G Hives    Penicillin G Potassium In D5w Unknown (comments)    Percocet [Oxycodone-Acetaminophen] Other (comments)     GI    Potassium Unknown (comments)    Prolia [Denosumab] Hives    Sulfa (Sulfonamide Antibiotics) Hives    Sulfanilamide Unknown (comments)          OBJECTIVE:    Physical Exam  VITAL SIGNS: There were no vitals taken for this visit. GENERAL JEFFREY: in no apparent distress and well developed and well nourished   MUSCULOSKEL: no joint tenderness, deformity or swelling   INTEGUMENT:  warm and dry, no rashes or lesions   ABDOMEN . soft, NT, ND, No masses appreciated   EXTREMITIES: extremities normal, atraumatic, no cyanosis or edema   PELVIC: Vulva and vagina appear normal. Bimanual exam reveals normal uterus and adnexa.    RECTAL: deferred   ANISH SURVEY: Cervical, supraclavicular, axillary and inguinal nodes normal.   NEURO: Grossly normal         IMPRESSION/PLAN:  Complex Atypical Hyperplasia   - reviewed pathology and risk of progression to endo ca of approx 30% if left untreated; also discussed at time of hysterectomy there is approximately 40% chance of finding occult malignancy   -given that and her age would recommend proceeding with hysterectomy/BSO with possible staging pending intraop path   -discussed laparoscopic approach and risks, benefits and alternatives of surgery discussed in detail    -informed consent signed and pre and postop instructions reviewed      The total time spent was 60 minutes regarding this patients diagnosis of CAH and >50% of this time was spent counseling and coordinating care    [address] [place] [address]  Gynecologic Oncology  6/84/373624:51 AM

## 2017-06-26 NOTE — OP NOTES
[address] [place] [address]  OPERATIVE REPORT    Name:  Saad Avila  MR#:  [id number]  :  1945  Account #:  [de-identified]  Date of Adm:  2017  Date of Surgery:  2017      PREOPERATIVE DIAGNOSIS: Complex atypical hyperplasia. POSTOPERATIVE DIAGNOSIS: Complex atypical hyperplasia. PROCEDURES PERFORMED: Total laparoscopic hysterectomy,  bilateral salpingo-oophorectomy, lysis of adhesions. SURGEON: Isadora Coulter MD    ASSISTANT: Lalo Garcia    ESTIMATED BLOOD LOSS: 25 mL. SPECIMENS REMOVED: Uterus, cervix, bilateral tubes and ovaries  and washings. ANESTHESIA: General.    FLUIDS: 1200 mL crystalloid. URINE OUTPUT: 55 mL. ESTIMATED BLOOD LOSS: 25 mL. COMPLICATIONS: None. FINDINGS: Laparoscopic findings revealed some adhesions of her  appendectomy stump to the anterior uterine and bladder peritoneum,  as well as some adhesions of the omentum to the anterior abdominal  wall at the umbilicus, extending to the right side. INDICATION: The patient is a 72-year-old who had an incidental  finding of a thickened endometrium on transvaginal ultrasound. She  was then seen by Dr. Ryan Wan and underwent a hysteroscopy, D and C,  polypectomy, consistent with complex atypical hyperplasia. She  presents today for definitive surgical management. DESCRIPTION OF PROCEDURE: The patient was taken to the  operating room where general anesthesia was obtained without  difficulty. She was placed in dorsal lithotomy position, prepped and  draped in normal sterile fashion. A surgical time-out was taken by the  entire surgical team. A Jasso catheter was placed. A sterile speculum  was then placed in the patient's vagina. The anterior lip of the cervix  grasped with single-tooth tenaculum. The cervix was then dilated and  an 8 cm JORGE tip with a 3.0 cm KOH cup was advanced without  difficulty.  Attention was then turned to the abdomen where an incision  was made above the umbilicus and carried down to underlying fascia. The fascia was then grasped with Kocher clamps and incised using  Dickson scissors. A 0 Vicryl stay suture was placed. Intraabdominal entry  was obtained using blunt dissection. The Jerrod trocar was then  advanced and pneumoperitoneum was obtained to 15 mmHg. At this  point, there appeared to be some adhesions of the omentum to the  anterior abdominal wall at the umbilicus and extending to the right. Therefore, a 5 mm trocar was placed in the left side under direct  visualization. A 5 mm laparoscope was then introduced through this  port and allowed us to visualize the right side of the abdomen. Therefore, a 5 mm trocar was placed in the right side of the abdomen  under direct visualization and through this trocar, the Harmonic scalpel  was advanced and lysis of adhesions was performed to mobilize the  omentum off the anterior abdominal wall up to the level where we were  able to place our second 5 mm trocar on the right side of the abdomen  under direct visualization. At this point, the laparoscope was switched  back to our 10 mm through the umbilical port. The patient was placed  in steep Trendelenburg. At this point, there appeared to be some  adhesions of the cecum to the anterior uterine and bladder peritoneum. This was taken down using the Harmonic scalpel which appeared to be  her prior appendectomy stump. Washings were obtained. Attention  was then turned to the right round ligament which was grasped and  transected using a Harmonic scalpel and this peritoneal incision was  extended superior along the infundibulopelvic ligament. The right  retroperitoneal space was opened. The ureter was seen peristalsing  below. A window above the ureter was made in the peritoneum and the  IP ligament was skeletonized, sealed and divided. This adnexa was  elevated and posteromedial leaf of the broad ligament was incised  towards the KOH cup.  This was then repeated on the left side in the  same fashion without difficulty. Next, the uterus was retroverted and  the anterior vesicouterine peritoneum was identified and incised along  the lower uterine segment. The bladder flap was then created using  both blunt and sharp dissection. The uterine arteries were then  skeletonized bilaterally, sealed and divided. The cardinal ligaments  were then sealed and divided. An anterior colpotomy was then made  and this was carried circumferentially around the KOH cup. The  specimen was pulled through the vagina and sent for frozen section  consistent with hyperplasia. At this point, the vaginal cuff was closed  using 2-0 PDS in a running 2-layer fashion. The pelvis was irrigated  copiously. Attention was then turned back to the cecum where a 3-0  silk interrupted stitch was placed to oversew the appendectomy stump. Next, the pelvis was once again irrigated copiously. There was good  hemostasis. At this point, all instruments were removed, the trocars  were removed and pneumoperitoneum was relieved. This umbilical  fascial incision was reapproximated with 0 Vicryl figure-of-eight stitch  x3. Skin sites were closed with 4-0 Monocryl. Marcaine 0.5% was  injected at the conclusion. The patient tolerated the procedure well. Sponge and needle count correct x2 and the patient was taken to the  recovery room in stable condition.         MD Leland Martinez / Mandie Pitts  D:  06/26/2017   10:05  T:  06/26/2017   16:51  Job #:  [id number]

## 2017-06-26 NOTE — PERIOP NOTES
Asking for ice chips pain is well controlled. Abdomen clean dry and intact costa patent was emptied and recorded.

## 2017-06-26 NOTE — ANESTHESIA PREPROCEDURE EVALUATION
Anesthetic History     PONV          Review of Systems / Medical History  Patient summary reviewed, nursing notes reviewed and pertinent labs reviewed    Pulmonary              Pertinent negatives: No COPD, asthma (h/o in the past) and smoker     Neuro/Psych         Psychiatric history     Cardiovascular    Hypertension            Pertinent negatives: No past MI and CAD       GI/Hepatic/Renal     GERD: well controlled    Renal disease: stones    Pertinent negatives: No liver disease   Endo/Other      Hypothyroidism  Arthritis  Pertinent negatives: No diabetes   Other Findings   Comments: -etoh         Physical Exam    Airway  Mallampati: IV  TM Distance: < 4 cm  Neck ROM: decreased range of motion   Mouth opening: Diminished (comment)    Comments: 2 cm T_M Cardiovascular               Dental    Dentition: Caps/crowns     Pulmonary                 Abdominal         Other Findings            Anesthetic Plan    ASA: 3  Anesthesia type: general          Induction: Intravenous  Anesthetic plan and risks discussed with: Patient      Probably interesting intubation

## 2017-06-26 NOTE — INTERVAL H&P NOTE
H&P Update:History and physical has been reviewed. The patient has been examined. There have been no significant clinical changes since the completion of the originally dated History and Sheldon Gregory was seen and examined. History and physical has been reviewed. The patient has been examined.  There have been no significant clinical changes since the completion of the originally dated History and Physical.    Signed By: Marybel Zarate MD     June 26, 2017 8:03 AM

## 2017-06-26 NOTE — PROGRESS NOTES
conducted an initial consultation and Spiritual Assessment for Abhilash oRman, who is a 73 y.o.,female. Patients Primary Language is: [place]. According to the patients EMR Gnosticist Affiliation is: [place].     The reason the Patient came to the hospital is:   Patient Active Problem List    Diagnosis Date Noted    Right Flank Pain 03/12/2015    Chest pain at rest 06/19/2013    Calculus of kidney 11/08/2012        The  provided the following Interventions:  Initiated a relationship of care and support. Explored issues of tru, belief, spirituality and Nondenominational/ritual needs while hospitalized. Listened empathically. Provided chaplaincy education. Provided information about Spiritual Care Services. Offered prayer and assurance of continued prayers on patients behalf. Chart reviewed. The following outcomes were achieved:  Patient shared limited information about both their medical narrative and spiritual journey/beliefs. Patient processed feeling about current hospitalization. Patient expressed gratitude for pastoral care visit. Assessment:  Patient does not have any Nondenominational/cultural needs that will affect patients preferences in health care. There are no further spiritual or Nondenominational issues which require intervention at this time. Plan:  Chaplains will continue to follow and will provide pastoral care on an as needed/requested basis.  recommends bedside caregivers page  on duty if patient shows signs of acute spiritual or emotional distress.       Sister Korin Chu [place], [address] [phone]

## 2017-06-26 NOTE — IP AVS SNAPSHOT
Deborah Durant 
 
 
 [address] [address] 
[phone] Patient: Uday Luz MRN: [id number] :1945 You are allergic to the following Allergen Reactions Acetaminophen Unable to Obtain Amoxicillin Hives Aspirin Hives Cephalexin Hives Nausea and Vomiting Ciprofloxacin Other (comments) GI distress Clavulanic Acid Unknown (comments) Codeine Hives Doxycycline Hives Eryc (Erythromycin) Other (comments) Gi ditress Formaldehyde Unknown (comments) Gold Sodium Thiomalate Unknown (comments) Hydrocodone Unknown (comments) Hydrocodone-Acetaminophen Rash Meperidine Hives Neomycin Other (comments) Nsaids (Non-Steroidal Anti-Inflammatory Drug) Unknown (comments) Oxycodone Unknown (comments) Penicillin G Hives Penicillin G Potassium In D5w Unknown (comments) Percocet (Oxycodone-Acetaminophen) Other (comments) GI Potassium Unknown (comments) Prolia (Denosumab) Hives Sulfa (Sulfonamide Antibiotics) Hives Sulfanilamide Unknown (comments) Recent Documentation Height Weight Breastfeeding? BMI OB Status Smoking Status 1.651 m 67.1 kg No 24.63 kg/m2 Postmenopausal Never Smoker Emergency Contacts Name Discharge Info Relation Home Work Mobile Vicente Samuel DISCHARGE CAREGIVER [3] Spouse [3] [phone] About your hospitalization You were admitted on:  2017 You last received care in the:  [address] [place] [address] You were discharged on:  2017 Unit phone number:  [phone] Why you were hospitalized Your primary diagnosis was:  Not on File Providers Seen During Your Hospitalizations Provider Role Specialty Primary office phone Arian Moreira MD Attending Provider Gynecologic Oncology [phone] Your Primary Care Physician (PCP) Primary Care Physician Office Phone Office Fax 1 [facility], [address] [phone] Follow-up Information Follow up With Details Comments Contact Info Svetlana Arguello MD On 6/30/2017 Follow up appointment scheduled for June 30, 2017 at 3:30 p.m. with Dr. Kehinde Galan (Dr. Philip Phillips is out of the office for a few months). 61 [facility] [address] [address] 
[phone] Arian Moreira MD On 7/25/2017 Follow up appointment scheduled for July 25, 2017 at 9:15 a.m. 600 [facility] 150 
[phone] Your Appointments Tuesday July 25, 2017  9:15 AM EDT  
POST OP with Arian Moreira MD  
[facility]  
 [address] [address]  
[phone] Current Discharge Medication List  
  
START taking these medications Dose & Instructions Dispensing Information Comments Morning Noon Evening Bedtime HYDROmorphone 2 mg tablet Commonly known as:  DILAUDID Your last dose was: Your next dose is:    
   
   
 Dose:  2-4 mg Take 1-2 Tabs by mouth every three (3) hours as needed. Max Daily Amount: 32 mg. Indications: Pain Quantity:  60 Tab Refills:  0 CONTINUE these medications which have NOT CHANGED Dose & Instructions Dispensing Information Comments Morning Noon Evening Bedtime  
 amLODIPine 2.5 mg tablet Commonly known as:  Wilfrido Hogue Your last dose was: Your next dose is:    
   
   
 Dose:  2.5 mg Take 2.5 mg by mouth nightly. Refills:  0  
     
   
   
   
  
 aspirin delayed-release 81 mg tablet Your last dose was: Your next dose is: Take  by mouth daily. Refills:  0 BENTYL 20 mg tablet Generic drug:  dicyclomine Your last dose was: Your next dose is:    
   
   
 Dose:  20 mg Take 20 mg by mouth two (2) times a day. Refills:  0  
     
   
   
   
  
 butalbital-aspirin-caffeine capsule Commonly known as:  Oh Oddi Your last dose was: Your next dose is:    
   
   
 Dose:  1 Cap Take 1 Cap by mouth every four (4) hours as needed for Pain. Refills:  0 Cetirizine 10 mg Cap Your last dose was: Your next dose is:    
   
   
 Dose:  10 mg Take 10 mg by mouth daily. Refills:  0 DEXILANT 30 mg capsule Generic drug:  dexlansoprazole Your last dose was: Your next dose is:    
   
   
 Dose:  30 mg Take 30 mg by mouth daily. Refills:  0 KlonoPIN 1 mg tablet Generic drug:  clonazePAM  
   
Your last dose was: Your next dose is:    
   
   
 Dose:  1 mg Take 1 mg by mouth daily. Indications: PANIC DISORDER Refills:  0 LUNESTA 2 mg tablet Generic drug:  eszopiclone Your last dose was: Your next dose is:    
   
   
 Dose:  2 mg Take 2 mg by mouth nightly. Refills:  0  
     
   
   
   
  
 multivitamin tablet Commonly known as:  ONE A DAY Your last dose was: Your next dose is:    
   
   
 Dose:  1 Tab Take 1 Tab by mouth daily. Refills:  0 NEPHROCAPS 1 mg capsule Generic drug:  b complex-vitamin c-folic acid Your last dose was: Your next dose is:    
   
   
 Dose:  1 Cap Take 1 Cap by mouth daily. Refills:  0 PEPCID 20 mg tablet Generic drug:  famotidine Your last dose was: Your next dose is:    
   
   
 Dose:  20 mg Take 20 mg by mouth as needed. Refills:  0 POTASSIUM PO Your last dose was: Your next dose is: Take  by mouth daily. Refills:  0 SINGULAIR 10 mg tablet Generic drug:  montelukast  
   
Your last dose was: Your next dose is:    
   
   
 Dose:  10 mg Take 10 mg by mouth daily as needed. Refills:  0  
     
   
   
   
  
 valACYclovir 500 mg tablet Commonly known as:  VALTREX Your last dose was: Your next dose is:    
   
   
 Dose:  500 mg Take 500 mg by mouth two (2) times a day. Refills:  0  
     
   
   
   
  
 VITAMIN D2 PO Your last dose was: Your next dose is:    
   
   
 Dose:  1 Tab Take 1 Tab by mouth daily. Refills:  0 XYZAL 5 mg tablet Generic drug:  levocetirizine Your last dose was: Your next dose is:    
   
   
 Dose:  5 mg Take 5 mg by mouth daily as needed for Allergies. Refills:  0 ZOCOR 40 mg tablet Generic drug:  simvastatin Your last dose was: Your next dose is:    
   
   
 Dose:  40 mg Take 40 mg by mouth nightly. Refills:  0 Where to Get Your Medications Information on where to get these meds will be given to you by the nurse or doctor. ! Ask your nurse or doctor about these medications HYDROmorphone 2 mg tablet Discharge Instructions DISCHARGE SUMMARY from Nurse The following personal items are in your possession at time of discharge: 
 
Dental Appliances: None Visual Aid: None Home Medications: None Jewelry: None Clothing: At bedside Other Valuables: None Personal Items Sent to Safe: none PATIENT INSTRUCTIONS: 
 
After general anesthesia or intravenous sedation, for 24 hours or while taking prescription Narcotics: · Limit your activities · Do not drive and operate hazardous machinery · Do not make important personal or business decisions · Do  not drink alcoholic beverages · If you have not urinated within 8 hours after discharge, please contact your surgeon on call. Report the following to your surgeon: 
· Excessive pain, swelling, redness or odor of or around the surgical area · Temperature over 100.5 · Nausea and vomiting lasting longer than 4 hours or if unable to take medications · Any signs of decreased circulation or nerve impairment to extremity: change in color, persistent  numbness, tingling, coldness or increase pain · Any questions What to do at Home: 
Recommended activity: Activity as tolerated, Activity as tolerated and no driving for today, No lifting, Driving, or Strenuous exercise for 3-4 weeks and No driving while on analgesics. If you experience any of the following symptoms dizziness, heavy vaginal bleeding, uncontrolled pain, severe vomitting, please follow up with your primary care provider. *  Please give a list of your current medications to your Primary Care Provider. *  Please update this list whenever your medications are discontinued, doses are 
    changed, or new medications (including over-the-counter products) are added. *  Please carry medication information at all times in case of emergency situations. These are general instructions for a healthy lifestyle: No smoking/ No tobacco products/ Avoid exposure to second hand smoke Surgeon General's Warning:  Quitting smoking now greatly reduces serious risk to your health. Obesity, smoking, and sedentary lifestyle greatly increases your risk for illness A healthy diet, regular physical exercise & weight monitoring are important for maintaining a healthy lifestyle You may be retaining fluid if you have a history of heart failure or if you experience any of the following symptoms:  Weight gain of 3 pounds or more overnight or 5 pounds in a week, increased swelling in our hands or feet or shortness of breath while lying flat in bed.   Please call your doctor as soon as you notice any of these symptoms; do not wait until your next office visit. Recognize signs and symptoms of STROKE: 
 
F-face looks uneven A-arms unable to move or move unevenly S-speech slurred or non-existent T-time-call 911 as soon as signs and symptoms begin-DO NOT go Back to bed or wait to see if you get better-TIME IS BRAIN. Warning Signs of HEART ATTACK Call 911 if you have these symptoms: 
? Chest discomfort. Most heart attacks involve discomfort in the center of the chest that lasts more than a few minutes, or that goes away and comes back. It can feel like uncomfortable pressure, squeezing, fullness, or pain. ? Discomfort in other areas of the upper body. Symptoms can include pain or discomfort in one or both arms, the back, neck, jaw, or stomach. ? Shortness of breath with or without chest discomfort. ? Other signs may include breaking out in a cold sweat, nausea, or lightheadedness. Don't wait more than five minutes to call [address]! Fast action can save your life. Calling 911 is almost always the fastest way to get lifesaving treatment. Emergency Medical Services staff can begin treatment when they arrive  up to an hour sooner than if someone gets to the hospital by car. The discharge information has been reviewed with the patient. The patient verbalized understanding. Discharge medications reviewed with the patient and spouse and appropriate educational materials and side effects teaching were provided. Patient armband removed and shredded Laparoscopic Hysterectomy: What to Expect at [facility] Your Recovery A hysterectomy is surgery to take out the uterus. In some cases, the ovaries and fallopian tubes also are taken out at the same time. You can expect to feel better and stronger each day, but you may need pain medicine for a week or two. You may get tired easily or have less energy than usual. The tiredness may last for several weeks after surgery.  You will probably notice that your belly is swollen and puffy. This is common. The swelling will take several weeks to go down. You may take about 4 to 6 weeks to fully recover. It is important to avoid lifting while you are recovering so that you can heal. 
This care sheet gives you a general idea about how long it will take for you to recover. But each person recovers at a different pace. Follow the steps below to get better as quickly as possible. How can you care for yourself at home? Activity · Rest when you feel tired. · Be active. Walking is a good choice. · Allow the area to heal. Don't move quickly or lift anything heavy until you are feeling better. · You may shower 24 to 48 hours after surgery, if your doctor okays it. Pat the incision dry. Do not take a bath for the first 2 weeks, or until your doctor tells you it is okay. · Ask your doctor when it is okay for you to have sex. Diet · You can eat your normal diet. If your stomach is upset, try bland, low-fat foods like plain rice, broiled chicken, toast, and yogurt. · If your bowel movements are not regular right after surgery, try to avoid constipation and straining. Drink plenty of water. Your doctor may suggest fiber, a stool softener, or a mild laxative. Medicines · Your doctor will tell you if and when you can restart your medicines. He or she will also give you instructions about taking any new medicines. · If you take blood thinners, such as warfarin (Coumadin), clopidogrel (Plavix), or aspirin, be sure to talk to your doctor. He or she will tell you if and when to start taking those medicines again. Make sure that you understand exactly what your doctor wants you to do. · Be safe with medicines. Read and follow all instructions on the label. ¨ If the doctor gave you a prescription medicine for pain, take it as prescribed.  
¨ If you are not taking a prescription pain medicine, ask your doctor if you can take an over-the-counter medicine. · If your doctor prescribed antibiotics, take them as directed. Do not stop taking them just because you feel better. You need to take the full course of antibiotics. Incision care · You may have stitches over the cuts (incisions) the doctor made in your belly. · If you have strips of tape on the cut (incision) the doctor made, leave the tape on for a week or until it falls off. · Wash the area daily with warm, soapy water, and pat it dry. Don't use hydrogen peroxide or alcohol. They can slow healing. · You may cover the area with a gauze bandage if it oozes fluid or rubs against clothing. · Change the bandage every day. Other instructions · You may have some light vaginal bleeding. Wear sanitary pads if needed. Do not douche or use tampons. · Don't have sex until the doctor says it is okay. Follow-up care is a key part of your treatment and safety. Be sure to make and go to all appointments, and call your doctor if you are having problems. It's also a good idea to know your test results and keep a list of the medicines you take. When should you call for help? Call 911 anytime you think you may need emergency care. For example, call if: 
· You passed out (lost consciousness). · You have sudden chest pain and shortness of breath, or you cough up blood. Call your doctor now or seek immediate medical care if: 
· You have severe vaginal bleeding. This means that you are soaking through your usual pads every hour for 2 or more hours. · You have sudden, severe pain in your belly or pelvis. · You have loose stitches, or your incision comes open. · You have signs of infection, such as: 
¨ Increased pain, swelling, warmth, or redness. ¨ Red streaks leading from the incision. ¨ Pus draining from the incision. ¨ Swollen lymph nodes in your neck, armpits, or groin. ¨ A fever. Watch closely for changes in your health, and be sure to contact your doctor if: · You do not get better as expected. Where can you learn more? Go to http://isabel-kaleb.info/. Enter Q131 in the search box to learn more about \"Laparoscopic Hysterectomy: What to Expect at Home. \" Current as of: October 13, 2016 Content Version: 11.3 © 0624-6174 [employer]. Care instructions adapted under license by [employer] (which disclaims liability or warranty for this information). If you have questions about a medical condition or this instruction, always ask your healthcare professional. [address] any warranty or liability for your use of this information. Discharge Orders None [employer] Announcement We are excited to announce that we are making your provider's discharge notes available to you in TargAnox. You will see these notes when they are completed and signed by the physician that discharged you from your recent hospital stay. If you have any questions or concerns about any information you see in TargAnox, please call the Health Information Department where you were seen or reach out to your Primary Care Provider for more information about your plan of care. Introducing [facility]! [employer] introduces Targ[employer] patient portal. Now you can access parts of your medical record, email your doctor's office, and request medication refills online. 1. In your internet browser, go to [email or web address] 2. Click on the First Time User? Click Here link in the Sign In box. You will see the New Member Sign Up page. 3. Enter your TargAnox Access Code exactly as it appears below. You will not need to use this code after youve completed the sign-up process. If you do not sign up before the expiration date, you must request a new code. · TargAn[employer] Access Code: [email or web address] Expires: 8/14/2017  2:22 PM 
 
4.  Enter the last four digits of your Social Security Number (xxxx) and Date of Birth (mm/dd/yyyy) as indicated and click Submit. You will be taken to the next sign-up page. 5. Create a [employer] ID. This will be your [employer] login ID and cannot be changed, so think of one that is secure and easy to remember. 6. Create a [employer] password. You can change your password at any time. 7. Enter your Password Reset Question and Answer. This can be used at a later time if you forget your password. 8. Enter your e-mail address. You will receive e-mail notification when new information is available in [address] 9. Click Sign Up. You can now view and download portions of your medical record. 10. Click the Download Summary menu link to download a portable copy of your medical information. If you have questions, please visit the Frequently Asked Questions section of the [employer] website. Remember, [employer] is NOT to be used for urgent needs. For medical emergencies, dial 911. Now available from your iPhone and Android! General Information Please provide this summary of care documentation to your next provider. Patient Signature:  ____________________________________________________________ Date:  ____________________________________________________________  
  
Neita Hidden Provider Signature:  ____________________________________________________________ Date:  ____________________________________________________________

## 2017-06-26 NOTE — PERIOP NOTES
TRANSFER - IN REPORT:    Verbal report received from CRNA & ORN on Tamar Leung  being received from OR (unit) for routine post - op      Report consisted of patients Situation, Background, Assessment and   Recommendations(SBAR). Information from the following report(s) SBAR, Intake/Output and MAR was reviewed with the receiving nurse. Opportunity for questions and clarification was provided. Assessment completed upon patients arrival to unit and care assumed.

## 2017-06-26 NOTE — PROGRESS NOTES
Received patient at [phone], patient is alert and oriented. Vital sign are within normal limits no complaint of pain at this time. Incision sites from surgery were dry and intact, patient has been able to void without difficulty. Patient is in bed resting at this time, will continue to monitor.

## 2017-06-26 NOTE — PERIOP NOTES
Verbal hand off at the bedside with Main Espnio provided opportunity for questions. Family met patient in the room.

## 2017-06-26 NOTE — ROUTINE PROCESS
Bedside shift change report given to SHAWN Hoyt (oncoming nurse) by Marquis Vannessa RN (offgoing nurse). Report included the following information SBAR, Kardex, MAR, Recent Results and Med Rec Status.

## 2017-06-26 NOTE — PERIOP NOTES
Pain is better resting quietly vital signs stable patent airway. Family was updated and sent over to [address] [place] [address].

## 2017-06-26 NOTE — BRIEF OP NOTE
BRIEF OPERATIVE NOTE    Date of Procedure: 6/26/2017   Preoperative Diagnosis: COMPLEX HYPERPLASIA  Postoperative Diagnosis: COMPLEX HYPERPLASIA    Procedure(s):  TOTAL LAPAROSCOPIC HYSTERECTOMY,BILATERAL SALPINGO OOPHORECTOMY, LYSIS OF ADHESIONS, FROZEN PATHOLOGIC EVALUATION  Surgeon(s) and Role:     * Amil Kocher, MD - Primary         Assistant Staff:       Surgical Staff:  Circ-1: Coco Ventura RN  Scrub Tech-1: Yudy Mcmillan  Surg Asst-1: Bobbi Andrade  Event Time In   Incision Start 0840   Incision Close      Anesthesia: General   Estimated Blood Loss: 25  Specimens:   ID Type Source Tests Collected by Time Destination   1 : uterus, cervix, bilateral fallopian tubes and ovaries Frozen Section Uterus with Bilateral Fallopian tubes and Ovaries  Amil Kocher, MD 6/26/2017 3043 Pathology   1 : PELVIC WASHINGS Fresh Pelvis  Amil Kocher, MD 6/26/2017 9305 Cytology      Findings: normal uterus, tubes, ovaries. No carcinoma on frozen.  Adhesion of appendiceal stump to anterior uterine peritonem   Complications: none  Implants: * No implants in log *

## 2017-06-27 VITALS
RESPIRATION RATE: 18 BRPM | OXYGEN SATURATION: 94 % | TEMPERATURE: 98.2 F | WEIGHT: 148 LBS | HEIGHT: 65 IN | SYSTOLIC BLOOD PRESSURE: 120 MMHG | DIASTOLIC BLOOD PRESSURE: 54 MMHG | BODY MASS INDEX: 24.66 KG/M2 | HEART RATE: 84 BPM

## 2017-06-27 LAB
ANION GAP BLD CALC-SCNC: 7 MMOL/L (ref 3–18)
BASOPHILS # BLD AUTO: 0 K/UL (ref 0–0.06)
BASOPHILS # BLD: 0 % (ref 0–2)
BUN SERPL-MCNC: 8 MG/DL (ref 7–18)
BUN/CREAT SERPL: 11 (ref 12–20)
CALCIUM SERPL-MCNC: 8.5 MG/DL (ref 8.5–10.1)
CHLORIDE SERPL-SCNC: 104 MMOL/L (ref 100–108)
CO2 SERPL-SCNC: 30 MMOL/L (ref 21–32)
CREAT SERPL-MCNC: 0.76 MG/DL (ref 0.6–1.3)
DIFFERENTIAL METHOD BLD: ABNORMAL
EOSINOPHIL # BLD: 0 K/UL (ref 0–0.4)
EOSINOPHIL NFR BLD: 0 % (ref 0–5)
ERYTHROCYTE [DISTWIDTH] IN BLOOD BY AUTOMATED COUNT: 12.1 % (ref 11.6–14.5)
GLUCOSE SERPL-MCNC: 103 MG/DL (ref 74–99)
HCT VFR BLD AUTO: 33.7 % (ref 35–45)
HGB BLD-MCNC: 11.6 G/DL (ref 12–16)
LYMPHOCYTES # BLD AUTO: 21 % (ref 21–52)
LYMPHOCYTES # BLD: 2 K/UL (ref 0.9–3.6)
MCH RBC QN AUTO: 32.2 PG (ref 24–34)
MCHC RBC AUTO-ENTMCNC: 34.4 G/DL (ref 31–37)
MCV RBC AUTO: 93.6 FL (ref 74–97)
MONOCYTES # BLD: 1.1 K/UL (ref 0.05–1.2)
MONOCYTES NFR BLD AUTO: 11 % (ref 3–10)
NEUTS SEG # BLD: 6.4 K/UL (ref 1.8–8)
NEUTS SEG NFR BLD AUTO: 68 % (ref 40–73)
PLATELET # BLD AUTO: 249 K/UL (ref 135–420)
PMV BLD AUTO: 9.8 FL (ref 9.2–11.8)
POTASSIUM SERPL-SCNC: 3.3 MMOL/L (ref 3.5–5.5)
RBC # BLD AUTO: 3.6 M/UL (ref 4.2–5.3)
SODIUM SERPL-SCNC: 141 MMOL/L (ref 136–145)
WBC # BLD AUTO: 9.5 K/UL (ref 4.6–13.2)

## 2017-06-27 PROCEDURE — 99218 HC RM OBSERVATION: CPT

## 2017-06-27 PROCEDURE — 74011250637 HC RX REV CODE- 250/637: Performed by: OBSTETRICS & GYNECOLOGY

## 2017-06-27 PROCEDURE — 74011250636 HC RX REV CODE- 250/636: Performed by: OBSTETRICS & GYNECOLOGY

## 2017-06-27 PROCEDURE — 74011000258 HC RX REV CODE- 258: Performed by: OBSTETRICS & GYNECOLOGY

## 2017-06-27 PROCEDURE — 80048 BASIC METABOLIC PNL TOTAL CA: CPT | Performed by: OBSTETRICS & GYNECOLOGY

## 2017-06-27 PROCEDURE — 77030027138 HC INCENT SPIROMETER -A

## 2017-06-27 PROCEDURE — 36415 COLL VENOUS BLD VENIPUNCTURE: CPT | Performed by: OBSTETRICS & GYNECOLOGY

## 2017-06-27 PROCEDURE — 85025 COMPLETE CBC W/AUTO DIFF WBC: CPT | Performed by: OBSTETRICS & GYNECOLOGY

## 2017-06-27 PROCEDURE — 74011000250 HC RX REV CODE- 250: Performed by: OBSTETRICS & GYNECOLOGY

## 2017-06-27 RX ORDER — HYDROMORPHONE HYDROCHLORIDE 2 MG/1
2-4 TABLET ORAL
Qty: 60 TAB | Refills: 0 | Status: SHIPPED | OUTPATIENT
Start: 2017-06-27 | End: 2017-07-11 | Stop reason: SDUPTHER

## 2017-06-27 RX ADMIN — CLONAZEPAM 1 MG: 0.5 TABLET ORAL at 09:25

## 2017-06-27 RX ADMIN — SODIUM CHLORIDE, SODIUM LACTATE, POTASSIUM CHLORIDE, AND CALCIUM CHLORIDE 125 ML/HR: 600; 310; 30; 20 INJECTION, SOLUTION INTRAVENOUS at 00:58

## 2017-06-27 RX ADMIN — ENOXAPARIN SODIUM 40 MG: 40 INJECTION SUBCUTANEOUS at 06:32

## 2017-06-27 RX ADMIN — MONTELUKAST SODIUM 10 MG: 10 TABLET, FILM COATED ORAL at 09:25

## 2017-06-27 RX ADMIN — LORATADINE 10 MG: 10 TABLET ORAL at 09:25

## 2017-06-27 RX ADMIN — HYDROMORPHONE HYDROCHLORIDE 4 MG: 2 TABLET ORAL at 00:58

## 2017-06-27 RX ADMIN — ONDANSETRON 4 MG: 2 INJECTION INTRAMUSCULAR; INTRAVENOUS at 06:33

## 2017-06-27 RX ADMIN — DICYCLOMINE HYDROCHLORIDE 20 MG: 10 CAPSULE ORAL at 09:25

## 2017-06-27 RX ADMIN — CLINDAMYCIN PHOSPHATE 900 MG: 150 INJECTION, SOLUTION INTRAVENOUS at 00:58

## 2017-06-27 RX ADMIN — Medication 10 ML: at 06:33

## 2017-06-27 RX ADMIN — HYDROMORPHONE HYDROCHLORIDE 4 MG: 2 TABLET ORAL at 09:46

## 2017-06-27 RX ADMIN — PANTOPRAZOLE SODIUM 40 MG: 40 TABLET, DELAYED RELEASE ORAL at 06:44

## 2017-06-27 NOTE — PROGRESS NOTES
C/o pain x1 overnight, after medication pain went from a 7/10 to a 4/10. CHG wipes were done this AM as well as costa cath removed. Pt was up in bed and will call when needs to use the restroom.

## 2017-06-27 NOTE — ROUTINE PROCESS
Bedside shift change report given to Juana Gamino RN (oncoming nurse) by Melony Kruger RN (offgoing nurse).  Report included the following information SBAR, Kardex, OR Summary, Intake/Output, MAR and Alarm Parameters      Alarm parameters reviewed, on and audible Appropriate for patient clinical condition

## 2017-06-27 NOTE — PROGRESS NOTES
Order received to discharge patient at 80, patient vital signs have been within normal limits. Patient complained of pain and was medicated at 0946 per PRN order with Dilaudid, pain medication effective, no further complaints. Patient was educated about her prescribed medications and was given opportunities to ask question, patient verbalized understanding along with her spouse at her side. Patient left via wheelchair at 1220.

## 2017-06-27 NOTE — DISCHARGE SUMMARY
GYN Discharge Summary                        Patient ID:  Vera Khanna  73 y.o. Admission Date: 6/26/2017  Discharge Date:  06/27/17                                                 Attending: Anushka Sosa MD   PCP: David Alberts MD                                                                                               Reason for admission:   Complex atypical hyperplasia.     Discharge  diagnosis: Active Problems:    * No active hospital problems. *      Associated Conditions:        Patient Active Problem List   Diagnosis Code    Calculus of kidney N20.0    Right Flank Pain R10.11    Chest pain at rest R07.9              Past Medical History:   Diagnosis Date    Arthritis     Asthma     Cataract     Gall stones     GERD (gastroesophageal reflux disease)     Hay fever     Hiatal hernia with gastroesophageal reflux disease and esophagitis     Hyperlipidemia     Hypertension     Ill-defined condition     kidney stones    Kidney stones     bi-lateral    Nausea & vomiting     Psychiatric disorder     anxiety    Recurrent UTI     Sinusitis     Thyroid disease     thyroid nodules       Operative Procedure: Total laparoscopic hysterectomy, bilateral salpingo-oophorectomy, lysis of adhesions.     Complications:  none                   Transfusion:  none    Hospital Course: uncomplicated    Condition at discharge: good, stable, Afebrile, Ambulating and Eating, Drinking, Voiding    Labs:  Lab Results   Component Value Date/Time    WBC 9.5 06/27/2017 01:57 AM    HGB 11.6 06/27/2017 01:57 AM    HCT 33.7 06/27/2017 01:57 AM    PLATELET 111 63/64/1302 01:57 AM    MCV 93.6 06/27/2017 01:57 AM     Lab Results   Component Value Date/Time    Sodium 141 06/27/2017 01:57 AM    Potassium 3.3 06/27/2017 01:57 AM    Chloride 104 06/27/2017 01:57 AM    CO2 30 06/27/2017 01:57 AM    Anion gap 7 06/27/2017 01:57 AM    Glucose 103 06/27/2017 01:57 AM    BUN 8 06/27/2017 01:57 AM    Creatinine 0.76 06/27/2017 01:57 AM    BUN/Creatinine ratio 11 06/27/2017 01:57 AM    GFR est AA >60 06/27/2017 01:57 AM    GFR est non-AA >60 06/27/2017 01:57 AM         Current Discharge Medication List      START taking these medications    Details   HYDROmorphone (DILAUDID) 2 mg tablet Take 1-2 Tabs by mouth every three (3) hours as needed. Max Daily Amount: 32 mg. Indications: Pain  Qty: 60 Tab, Refills: 0         CONTINUE these medications which have NOT CHANGED    Details   POTASSIUM PO Take  by mouth daily. famotidine (PEPCID) 20 mg tablet Take 20 mg by mouth as needed. amLODIPine (NORVASC) 2.5 mg tablet Take 2.5 mg by mouth nightly. Cetirizine 10 mg cap Take 10 mg by mouth daily. Dexlansoprazole (DEXILANT) 30 mg CpDM Take 30 mg by mouth daily. dicyclomine (BENTYL) 20 mg tablet Take 20 mg by mouth two (2) times a day. eszopiclone (LUNESTA) 2 mg tablet Take 2 mg by mouth nightly. clonazePAM (KLONOPIN) 1 mg tablet Take 1 mg by mouth daily. Indications: PANIC DISORDER      simvastatin (ZOCOR) 40 mg tablet Take 40 mg by mouth nightly. b complex-vitamin c-folic acid (NEPHROCAPS) 1 mg capsule Take 1 Cap by mouth daily. montelukast (SINGULAIR) 10 mg tablet Take 10 mg by mouth daily as needed. ERGOCALCIFEROL, VITAMIN D2, (VITAMIN D2 PO) Take 1 Tab by mouth daily. aspirin delayed-release 81 mg tablet Take  by mouth daily. butalbital-aspirin-caffeine (FIORINAL) capsule Take 1 Cap by mouth every four (4) hours as needed for Pain.      multivitamin (ONE A DAY) tablet Take 1 Tab by mouth daily. valACYclovir (VALTREX) 500 mg tablet Take 500 mg by mouth two (2) times a day. levocetirizine (XYZAL) 5 mg tablet Take 5 mg by mouth daily as needed for Allergies.                Patient Instructions:        Disposition:  Home    Follow-up:  Follow up with Dr. Kai Rahman  in 4 weeks    Author:   Chino Pardo THAO-CHRISTIANO Bailey [place]   Gynecologic Oncology  6/27/2017  8:59 AM

## 2017-06-27 NOTE — DISCHARGE INSTRUCTIONS
DISCHARGE SUMMARY from Nurse    The following personal items are in your possession at time of discharge:    Dental Appliances: None  Visual Aid: None     Home Medications: None  Jewelry: None  Clothing: At bedside  Other Valuables: None  Personal Items Sent to Safe: none          PATIENT INSTRUCTIONS:    After general anesthesia or intravenous sedation, for 24 hours or while taking prescription Narcotics:  · Limit your activities  · Do not drive and operate hazardous machinery  · Do not make important personal or business decisions  · Do  not drink alcoholic beverages  · If you have not urinated within 8 hours after discharge, please contact your surgeon on call. Report the following to your surgeon:  · Excessive pain, swelling, redness or odor of or around the surgical area  · Temperature over 100.5  · Nausea and vomiting lasting longer than 4 hours or if unable to take medications  · Any signs of decreased circulation or nerve impairment to extremity: change in color, persistent  numbness, tingling, coldness or increase pain  · Any questions        What to do at Home:  Recommended activity: Activity as tolerated, Activity as tolerated and no driving for today, No lifting, Driving, or Strenuous exercise for 3-4 weeks and No driving while on analgesics. If you experience any of the following symptoms dizziness, heavy vaginal bleeding, uncontrolled pain, severe vomitting, please follow up with your primary care provider. *  Please give a list of your current medications to your Primary Care Provider. *  Please update this list whenever your medications are discontinued, doses are      changed, or new medications (including over-the-counter products) are added. *  Please carry medication information at all times in case of emergency situations.           These are general instructions for a healthy lifestyle:    No smoking/ No tobacco products/ Avoid exposure to second hand smoke    Surgeon General's Warning:  Quitting smoking now greatly reduces serious risk to your health. Obesity, smoking, and sedentary lifestyle greatly increases your risk for illness    A healthy diet, regular physical exercise & weight monitoring are important for maintaining a healthy lifestyle    You may be retaining fluid if you have a history of heart failure or if you experience any of the following symptoms:  Weight gain of 3 pounds or more overnight or 5 pounds in a week, increased swelling in our hands or feet or shortness of breath while lying flat in bed. Please call your doctor as soon as you notice any of these symptoms; do not wait until your next office visit. Recognize signs and symptoms of STROKE:    F-face looks uneven    A-arms unable to move or move unevenly    S-speech slurred or non-existent    T-time-call 911 as soon as signs and symptoms begin-DO NOT go       Back to bed or wait to see if you get better-TIME IS BRAIN. Warning Signs of HEART ATTACK     Call 911 if you have these symptoms:   Chest discomfort. Most heart attacks involve discomfort in the center of the chest that lasts more than a few minutes, or that goes away and comes back. It can feel like uncomfortable pressure, squeezing, fullness, or pain.  Discomfort in other areas of the upper body. Symptoms can include pain or discomfort in one or both arms, the back, neck, jaw, or stomach.  Shortness of breath with or without chest discomfort.  Other signs may include breaking out in a cold sweat, nausea, or lightheadedness. Don't wait more than five minutes to call 911 - MINUTES MATTER! Fast action can save your life. Calling 911 is almost always the fastest way to get lifesaving treatment. Emergency Medical Services staff can begin treatment when they arrive -- up to an hour sooner than if someone gets to the hospital by car. The discharge information has been reviewed with the patient.   The patient verbalized understanding. Discharge medications reviewed with the patient and spouse and appropriate educational materials and side effects teaching were provided. Patient armband removed and shredded                 Laparoscopic Hysterectomy: What to Expect at [address] [facility]    A hysterectomy is surgery to take out the uterus. In some cases, the ovaries and fallopian tubes also are taken out at the same time. You can expect to feel better and stronger each day, but you may need pain medicine for a week or two. You may get tired easily or have less energy than usual. The tiredness may last for several weeks after surgery. You will probably notice that your belly is swollen and puffy. This is common. The swelling will take several weeks to go down. You may take about 4 to 6 weeks to fully recover. It is important to avoid lifting while you are recovering so that you can heal.  This care sheet gives you a general idea about how long it will take for you to recover. But each person recovers at a different pace. Follow the steps below to get better as quickly as possible. How can you care for yourself at home? Activity  · Rest when you feel tired. · Be active. Walking is a good choice. · Allow the area to heal. Don't move quickly or lift anything heavy until you are feeling better. · You may shower 24 to 48 hours after surgery, if your doctor okays it. Pat the incision dry. Do not take a bath for the first 2 weeks, or until your doctor tells you it is okay. · Ask your doctor when it is okay for you to have sex. Diet  · You can eat your normal diet. If your stomach is upset, try bland, low-fat foods like plain rice, broiled chicken, toast, and yogurt. · If your bowel movements are not regular right after surgery, try to avoid constipation and straining. Drink plenty of water. Your doctor may suggest fiber, a stool softener, or a mild laxative.   Medicines  · Your doctor will tell you if and when you can restart your medicines. He or she will also give you instructions about taking any new medicines. · If you take blood thinners, such as warfarin (Coumadin), clopidogrel (Plavix), or aspirin, be sure to talk to your doctor. He or she will tell you if and when to start taking those medicines again. Make sure that you understand exactly what your doctor wants you to do. · Be safe with medicines. Read and follow all instructions on the label. ¨ If the doctor gave you a prescription medicine for pain, take it as prescribed. ¨ If you are not taking a prescription pain medicine, ask your doctor if you can take an over-the-counter medicine. · If your doctor prescribed antibiotics, take them as directed. Do not stop taking them just because you feel better. You need to take the full course of antibiotics. Incision care  · You may have stitches over the cuts (incisions) the doctor made in your belly. · If you have strips of tape on the cut (incision) the doctor made, leave the tape on for a week or until it falls off. · Wash the area daily with warm, soapy water, and pat it dry. Don't use hydrogen peroxide or alcohol. They can slow healing. · You may cover the area with a gauze bandage if it oozes fluid or rubs against clothing. · Change the bandage every day. Other instructions  · You may have some light vaginal bleeding. Wear sanitary pads if needed. Do not douche or use tampons. · Don't have sex until the doctor says it is okay. Follow-up care is a key part of your treatment and safety. Be sure to make and go to all appointments, and call your doctor if you are having problems. It's also a good idea to know your test results and keep a list of the medicines you take. When should you call for help? Call 911 anytime you think you may need emergency care. For example, call if:  · You passed out (lost consciousness). · You have sudden chest pain and shortness of breath, or you cough up blood.   Call your doctor now or seek immediate medical care if:  · You have severe vaginal bleeding. This means that you are soaking through your usual pads every hour for 2 or more hours. · You have sudden, severe pain in your belly or pelvis. · You have loose stitches, or your incision comes open. · You have signs of infection, such as:  ¨ Increased pain, swelling, warmth, or redness. ¨ Red streaks leading from the incision. ¨ Pus draining from the incision. ¨ Swollen lymph nodes in your neck, armpits, or groin. ¨ A fever. Watch closely for changes in your health, and be sure to contact your doctor if:  · You do not get better as expected. Where can you learn more? Go to http://isabel-kaleb.info/. Enter Q131 in the search box to learn more about \"Laparoscopic Hysterectomy: What to Expect at Home. \"  Current as of: October 13, 2016  Content Version: 11.3  © 7141-3679 [employer]. Care instructions adapted under license by [employer] (which disclaims liability or warranty for this information). If you have questions about a medical condition or this instruction, always ask your healthcare professional. [place] [address] any warranty or liability for your use of this information.

## 2017-07-03 ENCOUNTER — OFFICE VISIT (OUTPATIENT)
Dept: ONCOLOGY | Age: 72
End: 2017-07-03

## 2017-07-03 VITALS
OXYGEN SATURATION: 99 % | HEIGHT: 65 IN | SYSTOLIC BLOOD PRESSURE: 150 MMHG | BODY MASS INDEX: 24.22 KG/M2 | WEIGHT: 145.4 LBS | HEART RATE: 100 BPM | RESPIRATION RATE: 18 BRPM | DIASTOLIC BLOOD PRESSURE: 74 MMHG | TEMPERATURE: 99 F

## 2017-07-03 DIAGNOSIS — R23.8 SKIN IRRITATION: ICD-10-CM

## 2017-07-03 DIAGNOSIS — N85.02 COMPLEX ATYPICAL ENDOMETRIAL HYPERPLASIA: Primary | ICD-10-CM

## 2017-07-11 ENCOUNTER — TELEPHONE (OUTPATIENT)
Dept: ONCOLOGY | Age: 72
End: 2017-07-11

## 2017-07-11 RX ORDER — HYDROMORPHONE HYDROCHLORIDE 2 MG/1
2-4 TABLET ORAL
Qty: 60 TAB | Refills: 0 | Status: SHIPPED | OUTPATIENT
Start: 2017-07-11 | End: 2017-07-19

## 2017-07-11 NOTE — TELEPHONE ENCOUNTER
C/o pain to right of umbilicus and in [employer]. Took Dilaudid with relief, but is running low on rx. Patient also reports feeling cold but not running a fever. Will leave rx refill available for pickup at convenience. No nausea, BM ok with stool softener and laxative. Pt not able to take OTC Tylenol/Ibuprofen. Incisions look good, adhesive reaction spots \"looking better\". Pt to call office with new or worsening sx's, otherwise f/u as scheduled. Pt agreeable.

## 2017-07-11 NOTE — TELEPHONE ENCOUNTER
Patient called stating she had surgery 2 weeks ago and felt she was getting better but is now experiencing pain in her right abdomin about a finger below her belly button. She took two of the pills given to her at time of surgery at about 2 pm and is requesting a call back at [phone].

## 2017-07-14 ENCOUNTER — HOSPITAL ENCOUNTER (INPATIENT)
Age: 72
LOS: 3 days | Discharge: HOME | DRG: 373 | End: 2017-07-17
Attending: OBSTETRICS & GYNECOLOGY | Admitting: OBSTETRICS & GYNECOLOGY
Payer: MEDICARE

## 2017-07-14 ENCOUNTER — OFFICE VISIT (OUTPATIENT)
Dept: ONCOLOGY | Age: 72
End: 2017-07-14

## 2017-07-14 ENCOUNTER — APPOINTMENT (OUTPATIENT)
Dept: CT IMAGING | Age: 72
DRG: 373 | End: 2017-07-14
Attending: NURSE PRACTITIONER
Payer: MEDICARE

## 2017-07-14 VITALS
OXYGEN SATURATION: 96 % | TEMPERATURE: 97 F | RESPIRATION RATE: 18 BRPM | SYSTOLIC BLOOD PRESSURE: 130 MMHG | HEIGHT: 65 IN | WEIGHT: 141.1 LBS | DIASTOLIC BLOOD PRESSURE: 69 MMHG | BODY MASS INDEX: 23.51 KG/M2 | HEART RATE: 109 BPM

## 2017-07-14 DIAGNOSIS — R19.7 DIARRHEA OF PRESUMED INFECTIOUS ORIGIN: ICD-10-CM

## 2017-07-14 DIAGNOSIS — E86.0 DEHYDRATION: Primary | ICD-10-CM

## 2017-07-14 DIAGNOSIS — N85.02 ENDOMETRIAL HYPERPLASIA WITH ATYPIA: ICD-10-CM

## 2017-07-14 LAB
ALBUMIN SERPL BCP-MCNC: 3.1 G/DL (ref 3.4–5)
ALBUMIN/GLOB SERPL: 0.9 {RATIO} (ref 0.8–1.7)
ALP SERPL-CCNC: 106 U/L (ref 45–117)
ALT SERPL-CCNC: 21 U/L (ref 13–56)
ANION GAP BLD CALC-SCNC: 10 MMOL/L (ref 3–18)
AST SERPL W P-5'-P-CCNC: 12 U/L (ref 15–37)
BASOPHILS # BLD AUTO: 0.1 K/UL (ref 0–0.06)
BASOPHILS # BLD: 1 % (ref 0–2)
BILIRUB SERPL-MCNC: 0.6 MG/DL (ref 0.2–1)
BUN SERPL-MCNC: 14 MG/DL (ref 7–18)
BUN/CREAT SERPL: 14 (ref 12–20)
CALCIUM SERPL-MCNC: 9.2 MG/DL (ref 8.5–10.1)
CHLORIDE SERPL-SCNC: 102 MMOL/L (ref 100–108)
CO2 SERPL-SCNC: 28 MMOL/L (ref 21–32)
CREAT SERPL-MCNC: 1.02 MG/DL (ref 0.6–1.3)
DIFFERENTIAL METHOD BLD: ABNORMAL
EOSINOPHIL # BLD: 0.2 K/UL (ref 0–0.4)
EOSINOPHIL NFR BLD: 1 % (ref 0–5)
ERYTHROCYTE [DISTWIDTH] IN BLOOD BY AUTOMATED COUNT: 12.2 % (ref 11.6–14.5)
GLOBULIN SER CALC-MCNC: 3.6 G/DL (ref 2–4)
GLUCOSE SERPL-MCNC: 118 MG/DL (ref 74–99)
HCT VFR BLD AUTO: 35.3 % (ref 35–45)
HGB BLD-MCNC: 12.4 G/DL (ref 12–16)
LYMPHOCYTES # BLD AUTO: 16 % (ref 21–52)
LYMPHOCYTES # BLD: 2.4 K/UL (ref 0.9–3.6)
MAGNESIUM SERPL-MCNC: 1.5 MG/DL (ref 1.6–2.6)
MCH RBC QN AUTO: 32 PG (ref 24–34)
MCHC RBC AUTO-ENTMCNC: 35.1 G/DL (ref 31–37)
MCV RBC AUTO: 91.2 FL (ref 74–97)
MONOCYTES # BLD: 0.9 K/UL (ref 0.05–1.2)
MONOCYTES NFR BLD AUTO: 6 % (ref 3–10)
NEUTS SEG # BLD: 11.4 K/UL (ref 1.8–8)
NEUTS SEG NFR BLD AUTO: 76 % (ref 40–73)
PLATELET # BLD AUTO: 268 K/UL (ref 135–420)
PMV BLD AUTO: 9.3 FL (ref 9.2–11.8)
POTASSIUM SERPL-SCNC: 3.4 MMOL/L (ref 3.5–5.5)
PROT SERPL-MCNC: 6.7 G/DL (ref 6.4–8.2)
RBC # BLD AUTO: 3.87 M/UL (ref 4.2–5.3)
SODIUM SERPL-SCNC: 140 MMOL/L (ref 136–145)
WBC # BLD AUTO: 14.9 K/UL (ref 4.6–13.2)

## 2017-07-14 PROCEDURE — 36415 COLL VENOUS BLD VENIPUNCTURE: CPT | Performed by: NURSE PRACTITIONER

## 2017-07-14 PROCEDURE — 74011636320 HC RX REV CODE- 636/320: Performed by: OBSTETRICS & GYNECOLOGY

## 2017-07-14 PROCEDURE — 65270000029 HC RM PRIVATE

## 2017-07-14 PROCEDURE — 85025 COMPLETE CBC W/AUTO DIFF WBC: CPT | Performed by: NURSE PRACTITIONER

## 2017-07-14 PROCEDURE — 83735 ASSAY OF MAGNESIUM: CPT | Performed by: NURSE PRACTITIONER

## 2017-07-14 PROCEDURE — 74011250637 HC RX REV CODE- 250/637: Performed by: NURSE PRACTITIONER

## 2017-07-14 PROCEDURE — 74011250636 HC RX REV CODE- 250/636: Performed by: NURSE PRACTITIONER

## 2017-07-14 PROCEDURE — 87493 C DIFF AMPLIFIED PROBE: CPT | Performed by: NURSE PRACTITIONER

## 2017-07-14 PROCEDURE — 87045 FECES CULTURE AEROBIC BACT: CPT | Performed by: NURSE PRACTITIONER

## 2017-07-14 PROCEDURE — 74011636320 HC RX REV CODE- 636/320: Performed by: NURSE PRACTITIONER

## 2017-07-14 PROCEDURE — 74177 CT ABD & PELVIS W/CONTRAST: CPT

## 2017-07-14 PROCEDURE — 80053 COMPREHEN METABOLIC PANEL: CPT | Performed by: NURSE PRACTITIONER

## 2017-07-14 RX ORDER — LORATADINE 10 MG/1
10 TABLET ORAL
Status: DISCONTINUED | OUTPATIENT
Start: 2017-07-14 | End: 2017-07-17 | Stop reason: HOSPADM

## 2017-07-14 RX ORDER — ASPIRIN 81 MG/1
81 TABLET ORAL DAILY
Status: DISCONTINUED | OUTPATIENT
Start: 2017-07-15 | End: 2017-07-17 | Stop reason: HOSPADM

## 2017-07-14 RX ORDER — ZOLPIDEM TARTRATE 5 MG/1
5 TABLET ORAL
Status: DISCONTINUED | OUTPATIENT
Start: 2017-07-14 | End: 2017-07-17 | Stop reason: HOSPADM

## 2017-07-14 RX ORDER — MELATONIN
1000 DAILY
Status: DISCONTINUED | OUTPATIENT
Start: 2017-07-15 | End: 2017-07-17 | Stop reason: HOSPADM

## 2017-07-14 RX ORDER — DICYCLOMINE HYDROCHLORIDE 10 MG/1
20 CAPSULE ORAL 2 TIMES DAILY
Status: DISCONTINUED | OUTPATIENT
Start: 2017-07-14 | End: 2017-07-17 | Stop reason: HOSPADM

## 2017-07-14 RX ORDER — AMLODIPINE BESYLATE 2.5 MG/1
2.5 TABLET ORAL
Status: DISCONTINUED | OUTPATIENT
Start: 2017-07-14 | End: 2017-07-17 | Stop reason: HOSPADM

## 2017-07-14 RX ORDER — ONDANSETRON 4 MG/1
4-8 TABLET, ORALLY DISINTEGRATING ORAL
Status: DISCONTINUED | OUTPATIENT
Start: 2017-07-14 | End: 2017-07-17 | Stop reason: HOSPADM

## 2017-07-14 RX ORDER — HYDROMORPHONE HYDROCHLORIDE 2 MG/1
2-4 TABLET ORAL
Status: DISCONTINUED | OUTPATIENT
Start: 2017-07-14 | End: 2017-07-17 | Stop reason: HOSPADM

## 2017-07-14 RX ORDER — MAGNESIUM SULFATE HEPTAHYDRATE 40 MG/ML
2 INJECTION, SOLUTION INTRAVENOUS ONCE
Status: COMPLETED | OUTPATIENT
Start: 2017-07-14 | End: 2017-07-15

## 2017-07-14 RX ORDER — POTASSIUM CHLORIDE 750 MG/1
10 TABLET, EXTENDED RELEASE ORAL DAILY
Status: DISCONTINUED | OUTPATIENT
Start: 2017-07-15 | End: 2017-07-17 | Stop reason: HOSPADM

## 2017-07-14 RX ORDER — FAMOTIDINE 20 MG/1
20 TABLET, FILM COATED ORAL
Status: DISCONTINUED | OUTPATIENT
Start: 2017-07-14 | End: 2017-07-17 | Stop reason: HOSPADM

## 2017-07-14 RX ORDER — SODIUM CHLORIDE, SODIUM LACTATE, POTASSIUM CHLORIDE, CALCIUM CHLORIDE 600; 310; 30; 20 MG/100ML; MG/100ML; MG/100ML; MG/100ML
125 INJECTION, SOLUTION INTRAVENOUS CONTINUOUS
Status: DISCONTINUED | OUTPATIENT
Start: 2017-07-14 | End: 2017-07-16

## 2017-07-14 RX ORDER — SODIUM CHLORIDE 0.9 % (FLUSH) 0.9 %
5-10 SYRINGE (ML) INJECTION EVERY 8 HOURS
Status: DISCONTINUED | OUTPATIENT
Start: 2017-07-14 | End: 2017-07-17 | Stop reason: HOSPADM

## 2017-07-14 RX ORDER — SIMVASTATIN 40 MG/1
40 TABLET, FILM COATED ORAL
Status: DISCONTINUED | OUTPATIENT
Start: 2017-07-14 | End: 2017-07-17 | Stop reason: HOSPADM

## 2017-07-14 RX ORDER — SODIUM CHLORIDE 0.9 % (FLUSH) 0.9 %
5-10 SYRINGE (ML) INJECTION AS NEEDED
Status: DISCONTINUED | OUTPATIENT
Start: 2017-07-14 | End: 2017-07-17 | Stop reason: HOSPADM

## 2017-07-14 RX ORDER — ZINC GLUCONATE 10 MG
LOZENGE ORAL
COMMUNITY
End: 2018-03-16

## 2017-07-14 RX ORDER — CLONAZEPAM 0.5 MG/1
1 TABLET ORAL DAILY
Status: DISCONTINUED | OUTPATIENT
Start: 2017-07-15 | End: 2017-07-17 | Stop reason: HOSPADM

## 2017-07-14 RX ORDER — PANTOPRAZOLE SODIUM 40 MG/1
40 TABLET, DELAYED RELEASE ORAL DAILY
Status: DISCONTINUED | OUTPATIENT
Start: 2017-07-15 | End: 2017-07-17 | Stop reason: HOSPADM

## 2017-07-14 RX ADMIN — DIATRIZOATE MEGLUMINE AND DIATRIZOATE SODIUM 30 ML: 600; 100 SOLUTION ORAL; RECTAL at 17:41

## 2017-07-14 RX ADMIN — SODIUM CHLORIDE, SODIUM LACTATE, POTASSIUM CHLORIDE, AND CALCIUM CHLORIDE 125 ML/HR: 600; 310; 30; 20 INJECTION, SOLUTION INTRAVENOUS at 17:08

## 2017-07-14 RX ADMIN — DICYCLOMINE HYDROCHLORIDE 20 MG: 10 CAPSULE ORAL at 22:24

## 2017-07-14 RX ADMIN — SIMVASTATIN 40 MG: 40 TABLET, FILM COATED ORAL at 22:24

## 2017-07-14 RX ADMIN — AMLODIPINE BESYLATE 2.5 MG: 2.5 TABLET ORAL at 22:24

## 2017-07-14 RX ADMIN — IOPAMIDOL 100 ML: 612 INJECTION, SOLUTION INTRAVENOUS at 20:06

## 2017-07-14 RX ADMIN — MAGNESIUM SULFATE HEPTAHYDRATE 2 G: 40 INJECTION, SOLUTION INTRAVENOUS at 17:41

## 2017-07-14 NOTE — IP AVS SNAPSHOT
Fabiano [place] 
 
 
 [address] [facility] [address] 
[phone] Patient: Jamal Ho MRN: [id number] :[id number] You are allergic to the following Allergen Reactions Acetaminophen Unable to Obtain Amoxicillin Hives Aspirin Hives Cephalexin Hives Nausea and Vomiting Ciprofloxacin Other (comments) GI distress Clavulanic Acid Unknown (comments) Codeine Hives Doxycycline Hives Eryc (Erythromycin) Other (comments) Gi ditress Formaldehyde Unknown (comments) Gold Sodium Thiomalate Unknown (comments) Hydrocodone Unknown (comments) Hydrocodone-Acetaminophen Rash Meperidine Hives Neomycin Other (comments) Nsaids (Non-Steroidal Anti-Inflammatory Drug) Unknown (comments) Oxycodone Unknown (comments) Penicillin G Hives Penicillin G Potassium In D5w Unknown (comments) Percocet (Oxycodone-Acetaminophen) Other (comments) GI Potassium Unknown (comments) Prolia (Denosumab) Hives Sulfa (Sulfonamide Antibiotics) Hives Sulfanilamide Unknown (comments) Recent Documentation Weight BMI OB Status Smoking Status 66.5 kg 24.4 kg/m2 Postmenopausal Never Smoker Emergency Contacts Name Discharge Info Relation Home Work Mobile Vicente Samuel DISCHARGE CAREGIVER [3] Spouse [3] [phone] Jose Samuel  Spouse [3] [phone] About your hospitalization You were admitted on:  2017 You last received care in the:  [address] [place] [address] You were discharged on:  2017 Unit phone number:  [phone] Why you were hospitalized Your primary diagnosis was:  Not on File Your diagnoses also included:  Diarrhea, Dehydration Providers Seen During Your Hospitalizations Provider Role Specialty Primary office phone Mike Roche MD Attending Provider Gynecologic Oncology [phone] Your Primary Care Physician (PCP) Primary Care Physician Office Phone Office Fax 1 [facility], [address] [phone] Follow-up Information Follow up With Details Comments Contact Nayeli Espino [facility] On 7/25/2017 Follow-up appointment @ 9:15 a.m. [address] [place] [address] [facility] [address] 
[phone] Girish Ley MD   [address] [place] [id number] 
[phone] Your Appointments Tuesday July 25, 2017  9:15 AM EDT  
POST OP with MD Manasa Stroud [facility] [address] [place] [address])  
 [address] [place] [address] Drive [address] [place] [address]  
[phone] Thursday August 24, 2017  8:30 AM EDT  
ESTABLISHED PATIENT with Dc Ramos MD  
[facility] ([address] [place] [address]) [address] [place] [address]  
[phone] Current Discharge Medication List  
  
START taking these medications Dose & Instructions Dispensing Information Comments Morning Noon Evening Bedtime  
 metroNIDAZOLE 500 mg tablet Commonly known as:  FLAGYL Your last dose was: Your next dose is:    
   
   
 Dose:  500 mg Take 1 Tab by mouth four (4) times daily for 14 days. Indications: Clostridium difficile infection Quantity:  56 Tab Refills:  0  
     
   
   
   
  
 simethicone 80 mg chewable tablet Commonly known as:  Noberto Harrison Your last dose was: Your next dose is:    
   
   
 Dose:  80 mg Take 1 Tab by mouth four (4) times daily as needed for up to 10 days. Indications: FLATULENCE Quantity:  40 Tab Refills:  0 CONTINUE these medications which have NOT CHANGED Dose & Instructions Dispensing Information Comments Morning Noon Evening Bedtime  
 amLODIPine 2.5 mg tablet Commonly known as:  Ambrocio Donte Your last dose was: Your next dose is:    
   
   
 Dose:  2.5 mg Take 2.5 mg by mouth nightly. Refills:  0  
     
   
   
   
  
 aspirin delayed-release 81 mg tablet Your last dose was: Your next dose is: Take  by mouth daily. Refills:  0 BENTYL 20 mg tablet Generic drug:  dicyclomine Your last dose was: Your next dose is:    
   
   
 Dose:  20 mg Take 20 mg by mouth two (2) times a day. Refills:  0  
     
   
   
   
  
 butalbital-aspirin-caffeine capsule Commonly known as:  Earnesteen Mis Your last dose was: Your next dose is:    
   
   
 Dose:  1 Cap Take 1 Cap by mouth every four (4) hours as needed for Pain. Refills:  0 Cetirizine 10 mg Cap Your last dose was: Your next dose is:    
   
   
 Dose:  10 mg Take 10 mg by mouth daily. Refills:  0 DEXILANT 30 mg capsule Generic drug:  dexlansoprazole Your last dose was: Your next dose is:    
   
   
 Dose:  30 mg Take 30 mg by mouth daily. Refills:  0 HYDROmorphone 2 mg tablet Commonly known as:  DILAUDID Your last dose was: Your next dose is:    
   
   
 Dose:  2-4 mg Take 1-2 Tabs by mouth every three (3) hours as needed. Max Daily Amount: 32 mg. Indications: Pain Quantity:  60 Tab Refills:  0 KlonoPIN 1 mg tablet Generic drug:  clonazePAM  
   
Your last dose was: Your next dose is:    
   
   
 Dose:  1 mg Take 1 mg by mouth daily. Indications: PANIC DISORDER Refills:  0 LUNESTA 2 mg tablet Generic drug:  eszopiclone Your last dose was: Your next dose is:    
   
   
 Dose:  2 mg Take 2 mg by mouth nightly. Refills:  0 magnesium 250 mg Tab Your last dose was: Your next dose is: Take  by mouth. Refills:  0  
     
   
   
   
  
 multivitamin tablet Commonly known as:  ONE A DAY Your last dose was: Your next dose is:    
   
   
 Dose:  1 Tab Take 1 Tab by mouth daily. Refills:  0 NEPHROCAPS 1 mg capsule Generic drug:  b complex-vitamin c-folic acid Your last dose was: Your next dose is:    
   
   
 Dose:  1 Cap Take 1 Cap by mouth daily. Refills:  0 PEPCID 20 mg tablet Generic drug:  famotidine Your last dose was: Your next dose is:    
   
   
 Dose:  20 mg Take 20 mg by mouth as needed. Refills:  0 POTASSIUM PO Your last dose was: Your next dose is:    
   
   
 Dose:  10 mEq Take 10 mEq by mouth daily. Refills:  0 SINGULAIR 10 mg tablet Generic drug:  montelukast  
   
Your last dose was: Your next dose is:    
   
   
 Dose:  10 mg Take 10 mg by mouth daily as needed. Refills:  0  
     
   
   
   
  
 valACYclovir 500 mg tablet Commonly known as:  VALTREX Your last dose was: Your next dose is:    
   
   
 Dose:  500 mg Take 500 mg by mouth two (2) times a day. Refills:  0  
     
   
   
   
  
 VITAMIN D2 PO Your last dose was: Your next dose is:    
   
   
 Dose:  1 Tab Take 1 Tab by mouth daily. Refills:  0 XYZAL 5 mg tablet Generic drug:  levocetirizine Your last dose was: Your next dose is:    
   
   
 Dose:  5 mg Take 5 mg by mouth daily as needed for Allergies. Refills:  0 ZOCOR 40 mg tablet Generic drug:  simvastatin Your last dose was: Your next dose is:    
   
   
 Dose:  40 mg Take 40 mg by mouth nightly. Refills:  0 Where to Get Your Medications Information on where to get these meds will be given to you by the nurse or doctor. ! Ask your nurse or doctor about these medications  
  metroNIDAZOLE 500 mg tablet  
 simethicone 80 mg chewable tablet Discharge Instructions None Discharge Orders None Introducing [facility]! Katina Ann introduces [employer] patient portal. Now you can access parts of your medical record, email your doctor's office, and request medication refills online. 1. In your internet browser, go to [email or web address] 2. Click on the First Time User? Click Here link in the Sign In box. You will see the New Member Sign Up page. 3. Enter your [employer] Access Code exactly as it appears below. You will not need to use this code after youve completed the sign-up process. If you do not sign up before the expiration date, you must request a new code. · [employer] Access Code: [email or web address] Expires: 8/14/2017  2:22 PM 
 
4. Enter the last four digits of your Social Security Number (xxxx) and Date of Birth (mm/dd/yyyy) as indicated and click Submit. You will be taken to the next sign-up page. 5. Create a [employer] ID. This will be your [employer] login ID and cannot be changed, so think of one that is secure and easy to remember. 6. Create a [employer] password. You can change your password at any time. 7. Enter your Password Reset Question and Answer. This can be used at a later time if you forget your password. 8. Enter your e-mail address. You will receive e-mail notification when new information is available in [address] 9. Click Sign Up. You can now view and download portions of your medical record. 10. Click the Download Summary menu link to download a portable copy of your medical information. If you have questions, please visit the Frequently Asked Questions section of the [employer] website.  Remember, [employer] is NOT to be used for urgent needs. For medical emergencies, dial 911. Now available from your iPhone and Android! General Information Please provide this summary of care documentation to your next provider. Patient Signature:  ____________________________________________________________ Date:  ____________________________________________________________  
  
Sigmund [place] Provider Signature:  ____________________________________________________________ Date:  ____________________________________________________________

## 2017-07-14 NOTE — PROGRESS NOTES
[facility] SPECIALISTS  [address] [place] [address], [address], [address] [facility]  [address] [facility], [address] [facility]  [place], [address]   [phone]  Korin Erin HOOD      Postoperative Office Note  Patient ID:  Name: Mayco Mauro  MRM: [id number]  : 1945/71 y.o. Date: 2017    SUBJECTIVE:    This is a 73 y.o.  female who presents 2 wks following Total laparoscopic hysterectomy, bilateral salpingo-oophorectomy, lysis of adhesions. Currently she has abdominal distention, and diarrhea. She says just feels lousy and not able to drink or eat much. Diarrhea started yesterday and kept her up most of night. Urinating without difficulty. Had episode of cold chills but denies fevers. Her pathology revealed      TOTAL HYSTERECTOMY / BILATERAL SALPINGO-OOPHORECTOMY:   CERVIX: NO ABNORMALITY SEEN.   ENDOMETRIUM: FOCAL COMPLEX HYPERPLASIA WITH ATYPIA. MYOMETRIUM: NO ABNORMALITY SEEN. SEROSA: ENDOMETRIOSIS. BILATERAL TUBES: NO ABNORMALITY SEEN. RIGHT OVARY: BENIGN CORTICAL CYSTS. LEFT OVARY: ENDOMETRIOSIS. Washings, Pelvic   Satisfactory for evaluation. No malignant cells found. Medications:     Current Outpatient Prescriptions on File Prior to Visit   Medication Sig Dispense Refill    HYDROmorphone (DILAUDID) 2 mg tablet Take 1-2 Tabs by mouth every three (3) hours as needed. Max Daily Amount: 32 mg. Indications: Pain 60 Tab 0    POTASSIUM PO Take  by mouth daily.  b complex-vitamin c-folic acid (NEPHROCAPS) 1 mg capsule Take 1 Cap by mouth daily.  famotidine (PEPCID) 20 mg tablet Take 20 mg by mouth as needed.  montelukast (SINGULAIR) 10 mg tablet Take 10 mg by mouth daily as needed.  ERGOCALCIFEROL, VITAMIN D2, (VITAMIN D2 PO) Take 1 Tab by mouth daily.  amLODIPine (NORVASC) 2.5 mg tablet Take 2.5 mg by mouth nightly.  aspirin delayed-release 81 mg tablet Take  by mouth daily.       butalbital-aspirin-caffeine (FIORINAL) capsule Take 1 Cap by mouth every four (4) hours as needed for Pain.  multivitamin (ONE A DAY) tablet Take 1 Tab by mouth daily.  Cetirizine 10 mg cap Take 10 mg by mouth daily.  valACYclovir (VALTREX) 500 mg tablet Take 500 mg by mouth two (2) times a day.  levocetirizine (XYZAL) 5 mg tablet Take 5 mg by mouth daily as needed for Allergies.  Dexlansoprazole (DEXILANT) 30 mg CpDM Take 30 mg by mouth daily.  dicyclomine (BENTYL) 20 mg tablet Take 20 mg by mouth two (2) times a day.  eszopiclone (LUNESTA) 2 mg tablet Take 2 mg by mouth nightly.  clonazePAM (KLONOPIN) 1 mg tablet Take 1 mg by mouth daily. Indications: PANIC DISORDER      simvastatin (ZOCOR) 40 mg tablet Take 40 mg by mouth nightly. No current facility-administered medications on file prior to visit. Allergies:      Allergies   Allergen Reactions    Acetaminophen Unable to Obtain    Amoxicillin Hives    Aspirin Hives    Cephalexin Hives and Nausea and Vomiting    Ciprofloxacin Other (comments)     GI distress    Clavulanic Acid Unknown (comments)    Codeine Hives    Doxycycline Hives    Eryc [Erythromycin] Other (comments)     Gi ditress    Formaldehyde Unknown (comments)    Gold Sodium Thiomalate Unknown (comments)    Hydrocodone Unknown (comments)    Hydrocodone-Acetaminophen Rash    Meperidine Hives    Neomycin Other (comments)    Nsaids (Non-Steroidal Anti-Inflammatory Drug) Unknown (comments)    Oxycodone Unknown (comments)    Penicillin G Hives    Penicillin G Potassium In D5w Unknown (comments)    Percocet [Oxycodone-Acetaminophen] Other (comments)     GI    Potassium Unknown (comments)    Prolia [Denosumab] Hives    Sulfa (Sulfonamide Antibiotics) Hives    Sulfanilamide Unknown (comments)       OBJECTIVE:    Vitals:   Visit Vitals    /69 (BP 1 Location: Right arm, BP Patient Position: Sitting)    Pulse (!) 109    Temp 97 °F (36.1 °C) (Oral)    Resp 18    Ht 5' 5\" (1.651 m)    Wt 64 kg (141 lb 1.6 oz)    SpO2 96%    BMI 23.48 kg/m2       Physical Examination:    General:  alert, cooperative, no distress   Abdomen: distended, bowel sounds active, non-tender   Incision: C/D/I   Pelvic: deferred   Rectal: not done   Extremity:   extremities normal, atraumatic, no cyanosis or edema     IMPRESSION/PLAN:    Nandamiguel Buier's postoperative course complicated by diarrhea, dehydration She has a working diagnosis of CAH, endometriosis.   Discussed that seems she may have caught a virus and has gastroenteritis but will admit to [facility] for hydration, CT abd/pelvis, labs, and stool for c diff  Pt in agreement with plan       Nery Mosley DO  Gynecologic Oncology  7/14/2017/9:40 AM

## 2017-07-14 NOTE — PROGRESS NOTES
Admission assessment completed. Pt in bed with family at bedside. A/Ox4. Pain scale 03-04/10, lower abdomen, from previous surgery 2wks ago. Pt denies any SOB or difficulty breathing. Pt denies any chest pain. Lung sounds clear. Bowel sounds active, last bm 07/14/17. Last meal 07/12/17. Abdomen distended. Closed lap sites x4.  strong and equal. Skin intact, tenting. Cap refill <3. Palpable radial and pedal pulses. Pt denies any numbness or tingling. Pt denies any calf pain. 1430-spoke to CT need clarification about dye, confirm use of dye due to several allergies as does pt need IV and/or oral contrast.    1440-unable to contact attending physician and NP. Called [phone] was then given # [phone] ( went to [facility]) was then given # [phone], spoke to staff will have NP to call back. 1554-Informed IRA Obregon about to verify CT order for contrast. Also update med list.    1839-paged dr. Carla Sutherland on call for [facility], inform that pt does not meet criteria for c-diff cx, want to know if test is still needed. 1850-call returned from Dr. Carla Sutherland informed her of criteria for c-diff cx was not met. Told to send off test due to pt WBC 14.9, range >[phone] or <4000, close to accepting range. also pt has had abx post op 2wks ago. Pt stated    2049-spoke to AMANDA De Jesus, infection control, about c-diff sample not meeting criteria.  Permission given to for test.

## 2017-07-14 NOTE — H&P
Gynecologic Oncology H&P     7/14/2017   Jelani [place]  [phone]   Allergies   Allergen Reactions    Acetaminophen Unable to Obtain    Amoxicillin Hives    Aspirin Hives    Cephalexin Hives and Nausea and Vomiting    Ciprofloxacin Other (comments)     GI distress    Clavulanic Acid Unknown (comments)    Codeine Hives    Doxycycline Hives    Eryc [Erythromycin] Other (comments)     Gi ditress    Formaldehyde Unknown (comments)    Gold Sodium Thiomalate Unknown (comments)    Hydrocodone Unknown (comments)    Hydrocodone-Acetaminophen Rash    Meperidine Hives    Neomycin Other (comments)    Nsaids (Non-Steroidal Anti-Inflammatory Drug) Unknown (comments)    Oxycodone Unknown (comments)    Penicillin G Hives    Penicillin G Potassium In D5w Unknown (comments)    Percocet [Oxycodone-Acetaminophen] Other (comments)     GI    Potassium Unknown (comments)    Prolia [Denosumab] Hives    Sulfa (Sulfonamide Antibiotics) Hives    Sulfanilamide Unknown (comments)        CHIEF COMPLAINT: dehydration, diarrhea    HISTORY OF PRESENT ILLNESS: 73year old  female who presents 2 wks following Total laparoscopic hysterectomy, bilateral salpingo-oophorectomy, lysis of adhesions. Currently she has abdominal distention, and diarrhea. She says just feels lousy and not able to drink or eat much. Diarrhea started yesterday and kept her up most of night. Urinating without difficulty. Had episode of cold chills but denies fevers. Patient was seen in the office today and was admitted for further evaluation and management of diarrhea and dehydration.     Her pathology revealed       TOTAL HYSTERECTOMY / BILATERAL SALPINGO-OOPHORECTOMY:   CERVIX: NO ABNORMALITY SEEN.   ENDOMETRIUM: FOCAL COMPLEX HYPERPLASIA WITH ATYPIA. MYOMETRIUM: NO ABNORMALITY SEEN. SEROSA: ENDOMETRIOSIS. BILATERAL TUBES: NO ABNORMALITY SEEN. RIGHT OVARY: BENIGN CORTICAL CYSTS.    LEFT OVARY: ENDOMETRIOSIS.      Washings, Pelvic   Satisfactory for evaluation. No malignant cells found.      Past Medical History:   Diagnosis Date    Arthritis     Asthma     Cataract     Gall stones     GERD (gastroesophageal reflux disease)     Hay fever     Hiatal hernia with gastroesophageal reflux disease and esophagitis     Hyperlipidemia     Hypertension     Ill-defined condition     kidney stones    Kidney stones     bi-lateral    Nausea & vomiting     Psychiatric disorder     anxiety    Recurrent UTI     Sinusitis     Thyroid disease     thyroid nodules      Past Surgical History:   Procedure Laterality Date    HX APPENDECTOMY      HX CATARACT REMOVAL Bilateral     HX CHOLECYSTECTOMY      HX GYN  1980    uterine suspension    HX KNEE ARTHROSCOPY      HX LITHOTRIPSY      HX ORTHOPAEDIC Right     trigger finger release    HX OTHER SURGICAL      cystourethroscopy      Family History   Problem Relation Age of Onset    Heart Disease Mother     Cancer Father     Hypertension Father       History   Drug Use No     History   Smoking Status    Never Smoker   Smokeless Tobacco    Never Used     History   Sexual Activity    Sexual activity: Yes    Partners: Male       REVIEW OF SYSTEMS:  Constitutional: No fever, +chills, +anorexia, weight loss, +weakness, +sleep disturbance. Skin/Breasts: No breast pain, lumps, nipple discharge, or axillary lumps. The patient notes no rash or skin irritation systemically. Cardiovascular: No chest pain,palpitations,syncope, or claudication   Respiratory: No cough, shortness of breath, hemotysis, or orthopnea   Gastointestinal: No nausea, vomiting, +loose frequent stools, no blood in the stools    Genitourinary: No urinary frequency, dysuria,hematuria, or history of stones. Endo: No cold intolerance,+excessive fatigue,+sleep disturbance.    Heme/Lymph: No anemia, easy bruising, history, of bleeding disorders, or lymphedema   Neurological: No numbness or focal weakness. No tremors. No problems with voiding or defecation. Psychiatric:  No symptoms of depression, +anxiety. No hallucinations or symptoms of psychosis. EXAMINATION:  Constitutional: The patient is in no acute distress. She is alert and oriented times three. Eyes: No lesions are present. Pupils are equal. The sclerea are kimberlee-icteric. Neck: The neck is supple with a normal thyroid. Heme/Lymph: There is no palpable lymphadenopathy in the cervical supraclavicular, or inguinal regions. Respiratory: The chest is clear to auscultation and percussion bilaterally. Cardiovascular: The heart is regular and without murmur. Abdomen: Soft, nontender, distended, NABS, no rebound, no guarding   Genitourinary: deferred   Skin: Warm and dry   Musculoskeletal: Extremeties are without edema or significant varicosities.      ASSESSMENT/PLAN:  S/p TLH/BSO   CAH, endometriosis  Possible gastroenteritis   Will obtain CT abd/pelvis   Diarrhea   C.diff panel   Dehydration   IVF   Replete molly [facility]  Gynecologic Oncology  7/14/17   [phone]

## 2017-07-14 NOTE — IP AVS SNAPSHOT
[place] 
 
 
 [address] [facility] [address] 
[phone] Patient: Jesus Alberto Rivers MRN: [id number] :1945 Current Discharge Medication List  
  
START taking these medications Dose & Instructions Dispensing Information Comments Morning Noon Evening Bedtime  
 metroNIDAZOLE 500 mg tablet Commonly known as:  FLAGYL Your last dose was: Your next dose is:    
   
   
 Dose:  500 mg Take 1 Tab by mouth four (4) times daily for 14 days. Indications: Clostridium difficile infection Quantity:  56 Tab Refills:  0  
     
   
   
   
  
 simethicone 80 mg chewable tablet Commonly known as:  Cely Frees Your last dose was: Your next dose is:    
   
   
 Dose:  80 mg Take 1 Tab by mouth four (4) times daily as needed for up to 10 days. Indications: FLATULENCE Quantity:  40 Tab Refills:  0 CONTINUE these medications which have NOT CHANGED Dose & Instructions Dispensing Information Comments Morning Noon Evening Bedtime  
 amLODIPine 2.5 mg tablet Commonly known as:  Achilles Nelson Your last dose was: Your next dose is:    
   
   
 Dose:  2.5 mg Take 2.5 mg by mouth nightly. Refills:  0  
     
   
   
   
  
 aspirin delayed-release 81 mg tablet Your last dose was: Your next dose is: Take  by mouth daily. Refills:  0 BENTYL 20 mg tablet Generic drug:  dicyclomine Your last dose was: Your next dose is:    
   
   
 Dose:  20 mg Take 20 mg by mouth two (2) times a day. Refills:  0  
     
   
   
   
  
 butalbital-aspirin-caffeine capsule Commonly known as:  Rudolpho Jaylan Your last dose was: Your next dose is:    
   
   
 Dose:  1 Cap Take 1 Cap by mouth every four (4) hours as needed for Pain. Refills:  0 Cetirizine 10 mg Cap Your last dose was: Your next dose is:    
   
   
 Dose:  10 mg Take 10 mg by mouth daily. Refills:  0 DEXILANT 30 mg capsule Generic drug:  dexlansoprazole Your last dose was: Your next dose is:    
   
   
 Dose:  30 mg Take 30 mg by mouth daily. Refills:  0 HYDROmorphone 2 mg tablet Commonly known as:  DILAUDID Your last dose was: Your next dose is:    
   
   
 Dose:  2-4 mg Take 1-2 Tabs by mouth every three (3) hours as needed. Max Daily Amount: 32 mg. Indications: Pain Quantity:  60 Tab Refills:  0 KlonoPIN 1 mg tablet Generic drug:  clonazePAM  
   
Your last dose was: Your next dose is:    
   
   
 Dose:  1 mg Take 1 mg by mouth daily. Indications: PANIC DISORDER Refills:  0 LUNESTA 2 mg tablet Generic drug:  eszopiclone Your last dose was: Your next dose is:    
   
   
 Dose:  2 mg Take 2 mg by mouth nightly. Refills:  0  
     
   
   
   
  
 magnesium 250 mg Tab Your last dose was: Your next dose is: Take  by mouth. Refills:  0  
     
   
   
   
  
 multivitamin tablet Commonly known as:  ONE A DAY Your last dose was: Your next dose is:    
   
   
 Dose:  1 Tab Take 1 Tab by mouth daily. Refills:  0 NEPHROCAPS 1 mg capsule Generic drug:  b complex-vitamin c-folic acid Your last dose was: Your next dose is:    
   
   
 Dose:  1 Cap Take 1 Cap by mouth daily. Refills:  0 PEPCID 20 mg tablet Generic drug:  famotidine Your last dose was: Your next dose is:    
   
   
 Dose:  20 mg Take 20 mg by mouth as needed. Refills:  0 POTASSIUM PO Your last dose was: Your next dose is:    
   
   
 Dose:  10 mEq Take 10 mEq by mouth daily. Refills:  0 SINGULAIR 10 mg tablet Generic drug:  montelukast  
   
Your last dose was: Your next dose is:    
   
   
 Dose:  10 mg Take 10 mg by mouth daily as needed. Refills:  0  
     
   
   
   
  
 valACYclovir 500 mg tablet Commonly known as:  VALTREX Your last dose was: Your next dose is:    
   
   
 Dose:  500 mg Take 500 mg by mouth two (2) times a day. Refills:  0  
     
   
   
   
  
 VITAMIN D2 PO Your last dose was: Your next dose is:    
   
   
 Dose:  1 Tab Take 1 Tab by mouth daily. Refills:  0 XYZAL 5 mg tablet Generic drug:  levocetirizine Your last dose was: Your next dose is:    
   
   
 Dose:  5 mg Take 5 mg by mouth daily as needed for Allergies. Refills:  0 ZOCOR 40 mg tablet Generic drug:  simvastatin Your last dose was: Your next dose is:    
   
   
 Dose:  40 mg Take 40 mg by mouth nightly. Refills:  0 Where to Get Your Medications Information on where to get these meds will be given to you by the nurse or doctor. ! Ask your nurse or doctor about these medications  
  metroNIDAZOLE 500 mg tablet  
 simethicone 80 mg chewable tablet

## 2017-07-15 LAB
ALBUMIN SERPL BCP-MCNC: 2.8 G/DL (ref 3.4–5)
ALBUMIN/GLOB SERPL: 0.8 {RATIO} (ref 0.8–1.7)
ALP SERPL-CCNC: 93 U/L (ref 45–117)
ALT SERPL-CCNC: 20 U/L (ref 13–56)
ANION GAP BLD CALC-SCNC: 10 MMOL/L (ref 3–18)
AST SERPL W P-5'-P-CCNC: 13 U/L (ref 15–37)
BACTERIA SPEC CULT: ABNORMAL
BASOPHILS # BLD AUTO: 0.1 K/UL (ref 0–0.06)
BASOPHILS # BLD: 1 % (ref 0–2)
BILIRUB SERPL-MCNC: 0.4 MG/DL (ref 0.2–1)
BUN SERPL-MCNC: 10 MG/DL (ref 7–18)
BUN/CREAT SERPL: 13 (ref 12–20)
CALCIUM SERPL-MCNC: 8.9 MG/DL (ref 8.5–10.1)
CHLORIDE SERPL-SCNC: 104 MMOL/L (ref 100–108)
CO2 SERPL-SCNC: 27 MMOL/L (ref 21–32)
CREAT SERPL-MCNC: 0.76 MG/DL (ref 0.6–1.3)
DIFFERENTIAL METHOD BLD: ABNORMAL
EOSINOPHIL # BLD: 0.7 K/UL (ref 0–0.4)
EOSINOPHIL NFR BLD: 8 % (ref 0–5)
ERYTHROCYTE [DISTWIDTH] IN BLOOD BY AUTOMATED COUNT: 12.1 % (ref 11.6–14.5)
GLOBULIN SER CALC-MCNC: 3.3 G/DL (ref 2–4)
GLUCOSE SERPL-MCNC: 111 MG/DL (ref 74–99)
HCT VFR BLD AUTO: 33.3 % (ref 35–45)
HGB BLD-MCNC: 11.5 G/DL (ref 12–16)
LYMPHOCYTES # BLD AUTO: 25 % (ref 21–52)
LYMPHOCYTES # BLD: 2.3 K/UL (ref 0.9–3.6)
MCH RBC QN AUTO: 31.7 PG (ref 24–34)
MCHC RBC AUTO-ENTMCNC: 34.5 G/DL (ref 31–37)
MCV RBC AUTO: 91.7 FL (ref 74–97)
MONOCYTES # BLD: 0.8 K/UL (ref 0.05–1.2)
MONOCYTES NFR BLD AUTO: 9 % (ref 3–10)
NEUTS SEG # BLD: 5.3 K/UL (ref 1.8–8)
NEUTS SEG NFR BLD AUTO: 57 % (ref 40–73)
PLATELET # BLD AUTO: 269 K/UL (ref 135–420)
PMV BLD AUTO: 9.6 FL (ref 9.2–11.8)
POTASSIUM SERPL-SCNC: 3.3 MMOL/L (ref 3.5–5.5)
PROT SERPL-MCNC: 6.1 G/DL (ref 6.4–8.2)
RBC # BLD AUTO: 3.63 M/UL (ref 4.2–5.3)
SERVICE CMNT-IMP: ABNORMAL
SODIUM SERPL-SCNC: 141 MMOL/L (ref 136–145)
WBC # BLD AUTO: 9.1 K/UL (ref 4.6–13.2)

## 2017-07-15 PROCEDURE — 87493 C DIFF AMPLIFIED PROBE: CPT | Performed by: OBSTETRICS & GYNECOLOGY

## 2017-07-15 PROCEDURE — 74011250637 HC RX REV CODE- 250/637: Performed by: OBSTETRICS & GYNECOLOGY

## 2017-07-15 PROCEDURE — 80053 COMPREHEN METABOLIC PANEL: CPT | Performed by: NURSE PRACTITIONER

## 2017-07-15 PROCEDURE — 74011250636 HC RX REV CODE- 250/636: Performed by: NURSE PRACTITIONER

## 2017-07-15 PROCEDURE — 85025 COMPLETE CBC W/AUTO DIFF WBC: CPT | Performed by: NURSE PRACTITIONER

## 2017-07-15 PROCEDURE — 65270000029 HC RM PRIVATE

## 2017-07-15 PROCEDURE — 36415 COLL VENOUS BLD VENIPUNCTURE: CPT | Performed by: NURSE PRACTITIONER

## 2017-07-15 PROCEDURE — 74011250637 HC RX REV CODE- 250/637: Performed by: NURSE PRACTITIONER

## 2017-07-15 PROCEDURE — 77030032490 HC SLV COMPR SCD KNE COVD -B

## 2017-07-15 RX ORDER — METRONIDAZOLE 250 MG/1
500 TABLET ORAL 3 TIMES DAILY
Status: DISCONTINUED | OUTPATIENT
Start: 2017-07-15 | End: 2017-07-16

## 2017-07-15 RX ORDER — SIMETHICONE 80 MG
80 TABLET,CHEWABLE ORAL
Status: DISCONTINUED | OUTPATIENT
Start: 2017-07-15 | End: 2017-07-17 | Stop reason: HOSPADM

## 2017-07-15 RX ADMIN — METRONIDAZOLE 500 MG: 250 TABLET ORAL at 15:45

## 2017-07-15 RX ADMIN — SIMETHICONE CHEW TAB 80 MG 80 MG: 80 TABLET ORAL at 19:49

## 2017-07-15 RX ADMIN — Medication 1 TABLET: at 10:24

## 2017-07-15 RX ADMIN — POTASSIUM CHLORIDE 10 MEQ: 10 TABLET, EXTENDED RELEASE ORAL at 10:25

## 2017-07-15 RX ADMIN — ONDANSETRON 8 MG: 4 TABLET, ORALLY DISINTEGRATING ORAL at 19:39

## 2017-07-15 RX ADMIN — AMLODIPINE BESYLATE 2.5 MG: 2.5 TABLET ORAL at 21:51

## 2017-07-15 RX ADMIN — SIMVASTATIN 40 MG: 40 TABLET, FILM COATED ORAL at 21:51

## 2017-07-15 RX ADMIN — ASPIRIN 81 MG: 81 TABLET, COATED ORAL at 10:24

## 2017-07-15 RX ADMIN — CLONAZEPAM 1 MG: 0.5 TABLET ORAL at 10:25

## 2017-07-15 RX ADMIN — PANTOPRAZOLE SODIUM 40 MG: 40 TABLET, DELAYED RELEASE ORAL at 10:24

## 2017-07-15 RX ADMIN — DICYCLOMINE HYDROCHLORIDE 20 MG: 10 CAPSULE ORAL at 20:15

## 2017-07-15 RX ADMIN — DICYCLOMINE HYDROCHLORIDE 20 MG: 10 CAPSULE ORAL at 10:25

## 2017-07-15 RX ADMIN — VITAMIN D, TAB 1000IU (100/BT) 1000 UNITS: 25 TAB at 10:24

## 2017-07-15 RX ADMIN — SODIUM CHLORIDE, SODIUM LACTATE, POTASSIUM CHLORIDE, AND CALCIUM CHLORIDE 125 ML/HR: 600; 310; 30; 20 INJECTION, SOLUTION INTRAVENOUS at 18:39

## 2017-07-15 RX ADMIN — METRONIDAZOLE 500 MG: 250 TABLET ORAL at 21:57

## 2017-07-15 NOTE — ROUTINE PROCESS
Bedside shift change report given to Aleida Arzate RN (oncoming nurse) by Yelitza Cox RN   (offgoing nurse). Report included the following information SBAR, Intake/Output and MAR.

## 2017-07-15 NOTE — PROGRESS NOTES
Shift Summary:  Patient denied pain or discomfort overnight. Episode of watery, brown stool, which was sent for c.diffe. Continues to have scant, bloody spotting from her vagina.

## 2017-07-15 NOTE — PROGRESS NOTES
Followed up with SHAWN Morataya regarding admission orders needing clarification. Evidently pt has a list, RN will update PTA med list and with contact MD on call regarding any necessary modifications.     [address] [facility] Pharmacist  [phone]

## 2017-07-15 NOTE — ROUTINE PROCESS
Bedside and Verbal shift change report given to SHAWN Astudillo (oncoming nurse) by Rocío oRsa (offgoing nurse). Report included the following information SBAR, Kardex, Intake/Output and MAR.

## 2017-07-15 NOTE — PROGRESS NOTES
1945 Pt up often for BM. Assisted to restroom. Zosyn an mylicon given. Pt stated that she had Chicken and a roll for dinner. Recommended to her have \"softer\" food such as applesauce. 1878 Paged Dr Jennie Payne to report potassium crit value 2.7. Order to received for PO potassium 40meq and LR with 40 meq potassium chloride, then redraw potassium stat 2 hours afterward. SHIFT SUMMARY: Pt up multiple times to have BM (more than 7 times); stool is loose and brown. C. Diff stool posivitve; maintained enteric precautions. Gave pt and  handout about C. Diff and enteric precautions. Potassium PO given and LR with KCl started. Mylicon given for abd discomfort. Pt had difficulty sleeping as she was up to the bathroom several times.

## 2017-07-15 NOTE — PROGRESS NOTES
conducted an initial consultation and Spiritual Assessment for Ivy Oliver, who is a 73 y.o.,female. Patients Primary Language is: [place]. According to the patients EMR Yarsanism Affiliation is: Mozell Counter.     The reason the Patient came to the hospital is:   Patient Active Problem List    Diagnosis Date Noted    Diarrhea 07/14/2017    Dehydration 07/14/2017    Right Flank Pain 03/12/2015    Chest pain at rest 06/19/2013    Calculus of kidney 11/08/2012        The  provided the following Interventions:  Initiated a relationship of care and support. Explored issues of tru, spirituality and/or Rastafarian needs while hospitalized. Listened empathically. Provided chaplaincy education. Provided information about Spiritual Care Services. Offered prayer and assurance of continued prayers on patient's behalf. Chart reviewed. The following outcomes were achieved:  Patient shared some information about their medical narrative and spiritual journey/beliefs. Patient processed feeling about current hospitalization. Patient expressed gratitude for the 's visit. Assessment:  Patient did not indicate any spiritual or Rastafarian issues which require Spiritual Care Services interventions at this time. Patient does not have any Rastafarian/cultural needs that will affect patients preferences in health care. Plan:  Chaplains will continue to follow and will provide pastoral care on an as needed or requested basis.  recommends bedside caregivers page  on duty if patient shows signs of acute spiritual or emotional distress.     [address], [facility] [facility]

## 2017-07-15 NOTE — PROGRESS NOTES
Admit date: 7/14/2017        ASSESSMENT/PLAN  Brooklyn Rodrigues is a 73 y. o.female 2 weeks post op admitted with malaise and diarrhea  Onc - AEH, no malignancy  GI - diarrhea continues. C.Diff pending. CT shows pancolitis. Will start po Flagyl today. FEN - continue IVF  ID - empiric Flagyl started  Renal - no issues  Heme - stable  DVT Prophylaxis - SCDs and ambulation  CV - no isssues  Pulm - no issues  Disp - will continue inpt management       SUBJECTIVE  Still feels \"lousy\". Mild pain. Continues to have diarrhea (5 times this am already).  No appetite but was able to eat some regular breakfast.      OBJECTIVE  Physical Exam:  Patient Vitals for the past 24 hrs:   BP Temp Pulse Resp SpO2 Weight   07/15/17 1133 131/62 98.1 °F (36.7 °C) 86 18 99 % -   07/15/17 0849 129/62 97.9 °F (36.6 °C) 89 18 98 % -   07/15/17 0502 131/70 98 °F (36.7 °C) 99 18 99 % -   07/14/17 2352 120/56 97.8 °F (36.6 °C) 89 18 100 % 65.5 kg (144 lb 4.8 oz)   07/14/17 1917 132/57 98 °F (36.7 °C) 93 16 97 % -   07/14/17 1707 - - - - - 64.9 kg (143 lb)   07/14/17 1427 105/50 99.2 °F (37.3 °C) 98 16 98 % -         Intake/Output Summary (Last 24 hours) at 07/15/17 1244  Last data filed at 07/15/17 0659   Gross per 24 hour   Intake             2055 ml   Output               50 ml   Net             2005 ml        General - resting in no acute distress, alert and oriented  HEENT - within normal limits  Heart - regular rate and rhythm  Lungs - clear to auscultation  Abdomen - soft, mildly tender, moderately distended, normal bowel sounds  Incision - healed  Extremities - no edema    Labs  CBC  Recent Labs      07/15/17   0630  07/14/17   1500   WBC  9.1  14.9*   HCT  33.3*  35.3   MCV  91.7  91.2   MONOS  9  6   EOS  8*  1   BASOS  1  1        CMP  Recent Labs      07/15/17   0630  07/14/17   1500   NA  141  140   CO2  27  28   BUN  10  14        Lab Results   Component Value Date/Time    Glucose 111 07/15/2017 06:30 AM          Current Hospital Medications    Current Facility-Administered Medications:     sodium chloride (NS) flush 5-10 mL, 5-10 mL, IntraVENous, Q8H, Mich Memory, NP    sodium chloride (NS) flush 5-10 mL, 5-10 mL, IntraVENous, PRN, Mich Memory, NP    lactated Ringers infusion, 125 mL/hr, IntraVENous, CONTINUOUS, Mich Memory, NP, Last Rate: 125 mL/hr at 07/14/17 1708, 125 mL/hr at 07/14/17 1708    HYDROmorphone (DILAUDID) tablet 2-4 mg, 2-4 mg, Oral, Q3H PRN, Mich Memory, NP    vit B Cmplx 3-FA-Vit C-Biotin (NEPHRO RONY RX) tablet 1 Tab, 1 Tab, Oral, DAILY, Mich Memory, NP, 1 Tab at 07/15/17 1024    cholecalciferol (VITAMIN D3) tablet 1,000 Units, 1,000 Units, Oral, DAILY, Mich Memory, NP, 1,000 Units at 07/15/17 1024    famotidine (PEPCID) tablet 20 mg, 20 mg, Oral, DAILY PRN, Mich Memory, NP    potassium chloride (KLOR-CON) tablet 10 mEq, 10 mEq, Oral, DAILY, Mich Memory, NP, 10 mEq at 07/15/17 1025    amLODIPine (NORVASC) tablet 2.5 mg, 2.5 mg, Oral, QHS, Mich Memory, NP, 2.5 mg at 07/14/17 2224    simvastatin (ZOCOR) tablet 40 mg, 40 mg, Oral, QHS, Mich Memory, NP, 40 mg at 07/14/17 2224    zolpidem (AMBIEN) tablet 5 mg, 5 mg, Oral, QHS PRN, Mich Memory, NP    clonazePAM (KlonoPIN) tablet 1 mg, 1 mg, Oral, DAILY, Mich Memory, NP, 1 mg at 07/15/17 1025    dicyclomine (BENTYL) capsule 20 mg, 20 mg, Oral, BID, Mich Memory, NP, 20 mg at 07/15/17 1025    pantoprazole (PROTONIX) tablet 40 mg, 40 mg, Oral, DAILY, Mich Memory, NP, 40 mg at 07/15/17 1024    loratadine (CLARITIN) tablet 10 mg, 10 mg, Oral, DAILY PRN, Mich Memory, NP    aspirin delayed-release tablet 81 mg, 81 mg, Oral, DAILY, Mich Caraballo, NP, 81 mg at 07/15/17 1024    ondansetron (ZOFRAN ODT) tablet 4-8 mg, 4-8 mg, Oral, Q8H PRN, Mich Caraballo, NP      Kathya Carballo MD   Pager  [phone]

## 2017-07-16 LAB
ALBUMIN SERPL BCP-MCNC: 2.8 G/DL (ref 3.4–5)
ALBUMIN/GLOB SERPL: 0.8 {RATIO} (ref 0.8–1.7)
ALP SERPL-CCNC: 88 U/L (ref 45–117)
ALT SERPL-CCNC: 24 U/L (ref 13–56)
ANION GAP BLD CALC-SCNC: 12 MMOL/L (ref 3–18)
AST SERPL W P-5'-P-CCNC: 14 U/L (ref 15–37)
BASOPHILS # BLD AUTO: 0 K/UL (ref 0–0.1)
BASOPHILS # BLD: 0 % (ref 0–3)
BILIRUB SERPL-MCNC: 0.2 MG/DL (ref 0.2–1)
BUN SERPL-MCNC: 4 MG/DL (ref 7–18)
BUN/CREAT SERPL: 6 (ref 12–20)
CALCIUM SERPL-MCNC: 8.8 MG/DL (ref 8.5–10.1)
CHLORIDE SERPL-SCNC: 106 MMOL/L (ref 100–108)
CO2 SERPL-SCNC: 25 MMOL/L (ref 21–32)
CREAT SERPL-MCNC: 0.69 MG/DL (ref 0.6–1.3)
DIFFERENTIAL METHOD BLD: ABNORMAL
EOSINOPHIL # BLD: 0.9 K/UL (ref 0–0.4)
EOSINOPHIL NFR BLD: 9 % (ref 0–5)
ERYTHROCYTE [DISTWIDTH] IN BLOOD BY AUTOMATED COUNT: 12.1 % (ref 11.6–14.5)
GLOBULIN SER CALC-MCNC: 3.3 G/DL (ref 2–4)
GLUCOSE BLD STRIP.AUTO-MCNC: 107 MG/DL (ref 70–110)
GLUCOSE SERPL-MCNC: 108 MG/DL (ref 74–99)
HCT VFR BLD AUTO: 32.3 % (ref 35–45)
HGB BLD-MCNC: 11.4 G/DL (ref 12–16)
LYMPHOCYTES # BLD AUTO: 36 % (ref 20–51)
LYMPHOCYTES # BLD: 3.4 K/UL (ref 0.8–3.5)
MCH RBC QN AUTO: 31.8 PG (ref 24–34)
MCHC RBC AUTO-ENTMCNC: 35.3 G/DL (ref 31–37)
MCV RBC AUTO: 90.2 FL (ref 74–97)
MONOCYTES # BLD: 0.8 K/UL (ref 0–1)
MONOCYTES NFR BLD AUTO: 8 % (ref 2–9)
NEUTS BAND NFR BLD MANUAL: 1 % (ref 0–5)
NEUTS SEG # BLD: 4.4 K/UL (ref 1.8–8)
NEUTS SEG NFR BLD AUTO: 46 % (ref 42–75)
PLATELET # BLD AUTO: 304 K/UL (ref 135–420)
PLATELET COMMENTS,PCOM: ABNORMAL
PMV BLD AUTO: 9.6 FL (ref 9.2–11.8)
POTASSIUM SERPL-SCNC: 2.7 MMOL/L (ref 3.5–5.5)
POTASSIUM SERPL-SCNC: 3.4 MMOL/L (ref 3.5–5.5)
PROT SERPL-MCNC: 6.1 G/DL (ref 6.4–8.2)
RBC # BLD AUTO: 3.58 M/UL (ref 4.2–5.3)
RBC MORPH BLD: ABNORMAL
SODIUM SERPL-SCNC: 143 MMOL/L (ref 136–145)
WBC # BLD AUTO: 9.5 K/UL (ref 4.6–13.2)
WBC MORPH BLD: ABNORMAL

## 2017-07-16 PROCEDURE — 74011250636 HC RX REV CODE- 250/636: Performed by: OBSTETRICS & GYNECOLOGY

## 2017-07-16 PROCEDURE — 85025 COMPLETE CBC W/AUTO DIFF WBC: CPT | Performed by: NURSE PRACTITIONER

## 2017-07-16 PROCEDURE — 74011250636 HC RX REV CODE- 250/636: Performed by: NURSE PRACTITIONER

## 2017-07-16 PROCEDURE — 74011250637 HC RX REV CODE- 250/637: Performed by: OBSTETRICS & GYNECOLOGY

## 2017-07-16 PROCEDURE — 65270000029 HC RM PRIVATE

## 2017-07-16 PROCEDURE — 82962 GLUCOSE BLOOD TEST: CPT

## 2017-07-16 PROCEDURE — 84132 ASSAY OF SERUM POTASSIUM: CPT | Performed by: NURSE PRACTITIONER

## 2017-07-16 PROCEDURE — 36415 COLL VENOUS BLD VENIPUNCTURE: CPT | Performed by: NURSE PRACTITIONER

## 2017-07-16 PROCEDURE — 74011250637 HC RX REV CODE- 250/637: Performed by: NURSE PRACTITIONER

## 2017-07-16 RX ORDER — METRONIDAZOLE 250 MG/1
500 TABLET ORAL 4 TIMES DAILY
Status: DISCONTINUED | OUTPATIENT
Start: 2017-07-16 | End: 2017-07-17 | Stop reason: HOSPADM

## 2017-07-16 RX ORDER — POTASSIUM CHLORIDE 20 MEQ/1
40 TABLET, EXTENDED RELEASE ORAL
Status: COMPLETED | OUTPATIENT
Start: 2017-07-16 | End: 2017-07-16

## 2017-07-16 RX ADMIN — SODIUM CHLORIDE, SODIUM LACTATE, POTASSIUM CHLORIDE, AND CALCIUM CHLORIDE 125 ML/HR: 600; 310; 30; 20 INJECTION, SOLUTION INTRAVENOUS at 04:08

## 2017-07-16 RX ADMIN — SIMVASTATIN 40 MG: 40 TABLET, FILM COATED ORAL at 21:13

## 2017-07-16 RX ADMIN — SIMETHICONE CHEW TAB 80 MG 80 MG: 80 TABLET ORAL at 21:42

## 2017-07-16 RX ADMIN — ASPIRIN 81 MG: 81 TABLET, COATED ORAL at 10:12

## 2017-07-16 RX ADMIN — POTASSIUM CHLORIDE: 2 INJECTION, SOLUTION, CONCENTRATE INTRAVENOUS at 05:29

## 2017-07-16 RX ADMIN — VITAMIN D, TAB 1000IU (100/BT) 1000 UNITS: 25 TAB at 10:12

## 2017-07-16 RX ADMIN — DICYCLOMINE HYDROCHLORIDE 20 MG: 10 CAPSULE ORAL at 10:12

## 2017-07-16 RX ADMIN — POTASSIUM CHLORIDE: 2 INJECTION, SOLUTION, CONCENTRATE INTRAVENOUS at 21:23

## 2017-07-16 RX ADMIN — POTASSIUM CHLORIDE 40 MEQ: 20 TABLET, EXTENDED RELEASE ORAL at 05:20

## 2017-07-16 RX ADMIN — METRONIDAZOLE 500 MG: 250 TABLET ORAL at 10:10

## 2017-07-16 RX ADMIN — ZOLPIDEM TARTRATE 5 MG: 5 TABLET ORAL at 21:13

## 2017-07-16 RX ADMIN — ONDANSETRON 8 MG: 4 TABLET, ORALLY DISINTEGRATING ORAL at 05:12

## 2017-07-16 RX ADMIN — POTASSIUM CHLORIDE: 2 INJECTION, SOLUTION, CONCENTRATE INTRAVENOUS at 14:33

## 2017-07-16 RX ADMIN — METRONIDAZOLE 500 MG: 250 TABLET ORAL at 21:13

## 2017-07-16 RX ADMIN — PANTOPRAZOLE SODIUM 40 MG: 40 TABLET, DELAYED RELEASE ORAL at 10:12

## 2017-07-16 RX ADMIN — SIMETHICONE CHEW TAB 80 MG 80 MG: 80 TABLET ORAL at 04:08

## 2017-07-16 RX ADMIN — SIMETHICONE CHEW TAB 80 MG 80 MG: 80 TABLET ORAL at 16:02

## 2017-07-16 RX ADMIN — CLONAZEPAM 1 MG: 0.5 TABLET ORAL at 10:11

## 2017-07-16 RX ADMIN — METRONIDAZOLE 500 MG: 250 TABLET ORAL at 18:44

## 2017-07-16 RX ADMIN — AMLODIPINE BESYLATE 2.5 MG: 2.5 TABLET ORAL at 21:13

## 2017-07-16 RX ADMIN — SIMETHICONE CHEW TAB 80 MG 80 MG: 80 TABLET ORAL at 18:50

## 2017-07-16 RX ADMIN — Medication 1 TABLET: at 10:12

## 2017-07-16 RX ADMIN — DICYCLOMINE HYDROCHLORIDE 20 MG: 10 CAPSULE ORAL at 21:13

## 2017-07-16 NOTE — ROUTINE PROCESS
Bedside and Verbal shift change report given to aJred Laura RN (oncoming nurse) by Sandra Alan   (offgoing nurse). Report included the following information SBAR, Kardex and MAR.

## 2017-07-16 NOTE — ROUTINE PROCESS
Bedside and Verbal shift change report given to Mitra Lomas RN (oncoming nurse) by Johnson Sloan RN  (offgoing nurse). Report included the following information SBAR, Kardex, Intake/Output and MAR.

## 2017-07-16 NOTE — PROGRESS NOTES
1928-Assessment complete at this time. Pt A&O x4. Lung sounds clear bilaterally. Pt has +PP bilaterally. Pt denies tingling and numbness in LE's. Pain reported a  0/10 on pain scale. SCD's applied bilat. 20G to rt forearm patent and infusing. Skin warm and dry. Pt has 4 healing lap sites on abd from prior hysterectomy surgery (2 weeks post-op) Abdomen soft and non-tender. Bowel sounds active x4 quadrants WDL's. Patient resting with bed in lowest position. Call light in reach. 0350-Shift reassessment complete at this time. No changes noted to previous assessment. Pt continues to deny pain. See flow sheet for details. Pt resting with bed in lowest position. Call light in reach. End of shift summary. Pt had uneventful shift. Pt has no complaints of pain through out shift. Pt ambulates ab george to  restroom. Pt had no diarrhea during shift. Pt resting with no needs or signs of distress at this time. Call light in reach.

## 2017-07-16 NOTE — PROGRESS NOTES
Shift summary:     Pt. Rested in bed with  by side and call light within reach. Scd's in use. Up to the bathroom with minimal assistance. Tolerated lunch. Pt. Stated her BM are beginning to form and frequency has subsided. No complaints of pain/nausea. 1800 After dinner pt. Experiencing excessive bouts of gas with cramping. BM's became loose once again. Pt. Also feeling nauseated, Zofran given. [id number] Lab called with Positive Cdiff result. 1900 Dr. Severino Rodriguez updated on lab result, and pain associated with gas, order for Mylicon given. Pt. Made aware of Cdiff result and treatment is underway per Flagyl 500 mg x 3 daily. Recommended light diet. Night nurse will provide pt. With additional education concerning.      Patient Vitals for the past 12 hrs:   Temp Pulse Resp BP SpO2   07/15/17 1959 97.9 °F (36.6 °C) 82 18 131/68 98 %   07/15/17 1619 97.5 °F (36.4 °C) (!) 46 18 116/67 98 %   07/15/17 1133 98.1 °F (36.7 °C) 86 18 131/62 99 %

## 2017-07-17 VITALS
TEMPERATURE: 97.3 F | SYSTOLIC BLOOD PRESSURE: 135 MMHG | DIASTOLIC BLOOD PRESSURE: 76 MMHG | BODY MASS INDEX: 24.4 KG/M2 | OXYGEN SATURATION: 100 % | HEART RATE: 78 BPM | RESPIRATION RATE: 18 BRPM | WEIGHT: 146.61 LBS

## 2017-07-17 LAB
ALBUMIN SERPL BCP-MCNC: 2.9 G/DL (ref 3.4–5)
ALBUMIN/GLOB SERPL: 0.9 {RATIO} (ref 0.8–1.7)
ALP SERPL-CCNC: 83 U/L (ref 45–117)
ALT SERPL-CCNC: 20 U/L (ref 13–56)
ANION GAP BLD CALC-SCNC: 8 MMOL/L (ref 3–18)
AST SERPL W P-5'-P-CCNC: 13 U/L (ref 15–37)
BACTERIA SPEC CULT: NORMAL
BASOPHILS # BLD AUTO: 0.1 K/UL (ref 0–0.06)
BASOPHILS # BLD: 1 % (ref 0–2)
BILIRUB SERPL-MCNC: 0.2 MG/DL (ref 0.2–1)
BUN SERPL-MCNC: 2 MG/DL (ref 7–18)
BUN/CREAT SERPL: 3 (ref 12–20)
CALCIUM SERPL-MCNC: 9 MG/DL (ref 8.5–10.1)
CHLORIDE SERPL-SCNC: 106 MMOL/L (ref 100–108)
CO2 SERPL-SCNC: 28 MMOL/L (ref 21–32)
CREAT SERPL-MCNC: 0.74 MG/DL (ref 0.6–1.3)
DIFFERENTIAL METHOD BLD: ABNORMAL
EOSINOPHIL # BLD: 0.6 K/UL (ref 0–0.4)
EOSINOPHIL NFR BLD: 7 % (ref 0–5)
ERYTHROCYTE [DISTWIDTH] IN BLOOD BY AUTOMATED COUNT: 12.1 % (ref 11.6–14.5)
GLOBULIN SER CALC-MCNC: 3.1 G/DL (ref 2–4)
GLUCOSE SERPL-MCNC: 99 MG/DL (ref 74–99)
HCT VFR BLD AUTO: 33.2 % (ref 35–45)
HGB BLD-MCNC: 11.4 G/DL (ref 12–16)
LYMPHOCYTES # BLD AUTO: 42 % (ref 21–52)
LYMPHOCYTES # BLD: 3.6 K/UL (ref 0.9–3.6)
MCH RBC QN AUTO: 31.3 PG (ref 24–34)
MCHC RBC AUTO-ENTMCNC: 34.3 G/DL (ref 31–37)
MCV RBC AUTO: 91.2 FL (ref 74–97)
MONOCYTES # BLD: 0.8 K/UL (ref 0.05–1.2)
MONOCYTES NFR BLD AUTO: 9 % (ref 3–10)
NEUTS SEG # BLD: 3.6 K/UL (ref 1.8–8)
NEUTS SEG NFR BLD AUTO: 41 % (ref 40–73)
PLATELET # BLD AUTO: 303 K/UL (ref 135–420)
PMV BLD AUTO: 9.6 FL (ref 9.2–11.8)
POTASSIUM SERPL-SCNC: 3.7 MMOL/L (ref 3.5–5.5)
PROT SERPL-MCNC: 6 G/DL (ref 6.4–8.2)
RBC # BLD AUTO: 3.64 M/UL (ref 4.2–5.3)
SERVICE CMNT-IMP: NORMAL
SODIUM SERPL-SCNC: 142 MMOL/L (ref 136–145)
WBC # BLD AUTO: 8.7 K/UL (ref 4.6–13.2)

## 2017-07-17 PROCEDURE — 80053 COMPREHEN METABOLIC PANEL: CPT | Performed by: NURSE PRACTITIONER

## 2017-07-17 PROCEDURE — 74011250636 HC RX REV CODE- 250/636: Performed by: OBSTETRICS & GYNECOLOGY

## 2017-07-17 PROCEDURE — 85025 COMPLETE CBC W/AUTO DIFF WBC: CPT | Performed by: NURSE PRACTITIONER

## 2017-07-17 PROCEDURE — 74011250637 HC RX REV CODE- 250/637: Performed by: NURSE PRACTITIONER

## 2017-07-17 PROCEDURE — 74011250637 HC RX REV CODE- 250/637: Performed by: OBSTETRICS & GYNECOLOGY

## 2017-07-17 PROCEDURE — 36415 COLL VENOUS BLD VENIPUNCTURE: CPT | Performed by: NURSE PRACTITIONER

## 2017-07-17 RX ORDER — METRONIDAZOLE 500 MG/1
500 TABLET ORAL 4 TIMES DAILY
Qty: 56 TAB | Refills: 0 | Status: SHIPPED | OUTPATIENT
Start: 2017-07-17 | End: 2017-07-19

## 2017-07-17 RX ORDER — SIMETHICONE 80 MG
80 TABLET,CHEWABLE ORAL
Qty: 40 TAB | Refills: 0 | Status: SHIPPED | OUTPATIENT
Start: 2017-07-17 | End: 2017-07-19

## 2017-07-17 RX ADMIN — POTASSIUM CHLORIDE: 2 INJECTION, SOLUTION, CONCENTRATE INTRAVENOUS at 14:53

## 2017-07-17 RX ADMIN — VITAMIN D, TAB 1000IU (100/BT) 1000 UNITS: 25 TAB at 10:36

## 2017-07-17 RX ADMIN — POTASSIUM CHLORIDE 10 MEQ: 10 TABLET, EXTENDED RELEASE ORAL at 10:38

## 2017-07-17 RX ADMIN — METRONIDAZOLE 500 MG: 250 TABLET ORAL at 13:35

## 2017-07-17 RX ADMIN — METRONIDAZOLE 500 MG: 250 TABLET ORAL at 10:37

## 2017-07-17 RX ADMIN — PANTOPRAZOLE SODIUM 40 MG: 40 TABLET, DELAYED RELEASE ORAL at 10:38

## 2017-07-17 RX ADMIN — METRONIDAZOLE 500 MG: 250 TABLET ORAL at 17:15

## 2017-07-17 RX ADMIN — DICYCLOMINE HYDROCHLORIDE 20 MG: 10 CAPSULE ORAL at 10:36

## 2017-07-17 RX ADMIN — CLONAZEPAM 1 MG: 0.5 TABLET ORAL at 10:36

## 2017-07-17 RX ADMIN — Medication 1 TABLET: at 10:36

## 2017-07-17 RX ADMIN — SIMETHICONE CHEW TAB 80 MG 80 MG: 80 TABLET ORAL at 03:26

## 2017-07-17 RX ADMIN — ASPIRIN 81 MG: 81 TABLET, COATED ORAL at 10:36

## 2017-07-17 RX ADMIN — POTASSIUM CHLORIDE: 2 INJECTION, SOLUTION, CONCENTRATE INTRAVENOUS at 05:46

## 2017-07-17 RX ADMIN — SIMETHICONE CHEW TAB 80 MG 80 MG: 80 TABLET ORAL at 17:15

## 2017-07-17 RX ADMIN — ONDANSETRON 8 MG: 4 TABLET, ORALLY DISINTEGRATING ORAL at 03:26

## 2017-07-17 NOTE — PROGRESS NOTES
Patient being discharged home via wheelchair to main entrance for family transport home. Discharge instructions reviewed with patient and discharge caregiver Kobi Paez (spouse). AVS reviewed with Elton Arredondo RN. All lines discharged prior to discharge.

## 2017-07-17 NOTE — PROGRESS NOTES
Admit date: 7/14/2017        ASSESSMENT/PLAN  Carmine Aguilar is a 73 y. o.female 2 weeks post op admitted with diarrhea now confirmed c. Diff  Onc - AEH, no malignancy  GI - diarrhea continues. CT showed pancolitis. Started on oral Flagyl 7/15/2017. FEN - Hypokalemia, corrected with adding K to maintenance fluids and KDUR. ID - c. Diff colitis  Renal - no issues   Heme - stable  DVT Prophylaxis - SCDs and ambulation  CV - no isssues  Pulm - no issues  Disp - will continue inpt management     SUBJECTIVE  Continues to have diarrhea. Also complains of insomnia. Patient reports that at home she takes Lunesta nightly and has not been receiving it.        OBJECTIVE  Physical Exam:  Patient Vitals for the past 24 hrs:   BP Temp Pulse Resp SpO2 Weight   07/16/17 2253 138/57 98.1 °F (36.7 °C) 78 18 97 % 66.5 kg (146 lb 9.7 oz)   07/16/17 2047 128/64 98.3 °F (36.8 °C) 69 18 100 % -   07/16/17 1537 124/72 97.8 °F (36.6 °C) 79 18 99 % -   07/16/17 1224 126/72 97.5 °F (36.4 °C) 96 16 100 % -   07/16/17 0913 - - - - 95 % -   07/16/17 0840 131/70 97.8 °F (36.6 °C) 82 18 95 % -         Intake/Output Summary (Last 24 hours) at 07/16/17 2322  Last data filed at 07/16/17 2124   Gross per 24 hour   Intake             2056 ml   Output             1700 ml   Net              356 ml        General - resting in no acute distress, alert and oriented  HEENT - within normal limits  Heart - regular rate and rhythm  Lungs - clear to auscultation  Abdomen - soft, minimally tender, mildly distended, normal bowel sounds  Incision - well healed  Extremities - no edema    Labs  CBC  Recent Labs      07/16/17   0306  07/15/17   0630  07/14/17   1500   WBC  9.5  9.1  14.9*   HCT  32.3*  33.3*  35.3   MCV  90.2  91.7  91.2   BANDS  1   --    --    MONOS  8  9  6   EOS  9*  8*  1   BASOS  0  1  1        CMP  Recent Labs      07/16/17   0306  07/15/17   0630  07/14/17   1500   NA  143  141  140   CO2  25  27  28   BUN  4*  10  14        Lab Results Component Value Date/Time    Glucose 108 07/16/2017 03:06 AM    Glucose (POC) 107 07/16/2017 12:21 PM          Current Hospital Medications    Current Facility-Administered Medications:     potassium chloride 40 mEq in lactated Ringers 1,000 mL IVPB, , IntraVENous, CONTINUOUS, Rizwana Camarillo MD, Last Rate: 125 mL/hr at 07/16/17 2123    metroNIDAZOLE (FLAGYL) tablet 500 mg, 500 mg, Oral, QID, Krys Lozano MD, 500 mg at 07/16/17 2113    simethicone (MYLICON) tablet 80 mg, 80 mg, Oral, QID PRN, Rizwana Camarillo MD, 80 mg at 07/16/17 2142    sodium chloride (NS) flush 5-10 mL, 5-10 mL, IntraVENous, Q8H, Emmit Solders, NP, Stopped at 07/15/17 1400    sodium chloride (NS) flush 5-10 mL, 5-10 mL, IntraVENous, PRN, Emmit Solders, NP    HYDROmorphone (DILAUDID) tablet 2-4 mg, 2-4 mg, Oral, Q3H PRN, Emmit Solders, NP    vit B Cmplx 3-FA-Vit C-Biotin (NEPHRO RONY RX) tablet 1 Tab, 1 Tab, Oral, DAILY, Siriit Solders, NP, 1 Tab at 07/16/17 1012    cholecalciferol (VITAMIN D3) tablet 1,000 Units, 1,000 Units, Oral, DAILY, Emmit Solders, NP, 1,000 Units at 07/16/17 1012    famotidine (PEPCID) tablet 20 mg, 20 mg, Oral, DAILY PRN, Siriit Solders, NP    potassium chloride (KLOR-CON) tablet 10 mEq, 10 mEq, Oral, DAILY, Emmit Solders, NP, 10 mEq at 07/15/17 1025    amLODIPine (NORVASC) tablet 2.5 mg, 2.5 mg, Oral, QHS, Emmit Solders, NP, 2.5 mg at 07/16/17 2113    simvastatin (ZOCOR) tablet 40 mg, 40 mg, Oral, QHS, Emmit Solders, NP, 40 mg at 07/16/17 2113    zolpidem (AMBIEN) tablet 5 mg, 5 mg, Oral, QHS PRN, Emmit Solders, NP, 5 mg at 07/16/17 2113    clonazePAM (KlonoPIN) tablet 1 mg, 1 mg, Oral, DAILY, Emmit Solders, NP, 1 mg at 07/16/17 1011    dicyclomine (BENTYL) capsule 20 mg, 20 mg, Oral, BID, Emmit Solders, NP, 20 mg at 07/16/17 2113    pantoprazole (PROTONIX) tablet 40 mg, 40 mg, Oral, DAILY, Emmit Solders, NP, 40 mg at 07/16/17 1012    loratadine (CLARITIN) tablet 10 mg, 10 mg, Oral, DAILY PRN, Moreno Emmanuel NP    aspirin delayed-release tablet 81 mg, 81 mg, Oral, DAILY, Moreno Emmanuel NP, 81 mg at 07/16/17 1012    ondansetron (ZOFRAN ODT) tablet 4-8 mg, 4-8 mg, Oral, Q8H PRN, Moreno Emmanuel NP, 8 mg at 07/16/17 [address], MD   Pager  [phone]

## 2017-07-17 NOTE — ROUTINE PROCESS
Bedside and verbal shift change report given to Martin Hare RN (oncoming nurse) by DONN Higgins RN (offgoing nurse). Report included the following information SBAR, Kardex and MAR.

## 2017-07-17 NOTE — PROGRESS NOTES
Pt admitted due to dehydration, diarrhea and abdominal distention. .  Pt recently with a TLH, bilateral salpingo-oophorectomy with lysis of adhesions. approximately 2 weeks ago. Please have pt ambulate in room to assist with determining plan of care needs. Pt's  will assist pt upon discharge. No plan of care needs have been identified at this time. CM remains available to assist as needed. Readmission Risk Assessment:     Moderate Risk and MSSP/Good Help ACO patients    RRAT Score:  13-20    Initial Assessment: physician follow up     Emergency Contact:  spouse    Pertinent Medical Hx:     See clinical    PCP/Specialists:  [employer]:   None    DME:      None    Moderate Risk Care Transition Plan:  1. Evaluate for [facility] or [facility], SNF, acute rehab, community care coordination of resources. 2. Involve patient/caregiver in assessment, planning, education and implement of intervention. 3. CM daily patient care huddles/interdisciplinary rounds. 4. PCP/Specialist appointment within 5  7 days made prior to discharge. 5. Facilitate transportation and logistics for follow-up appointments. 6. Medication reconciliation [id number]  7. Formal handoff between hospital provider and post-acute provider to transition patient  Handoff to [address] [facility] Nurse Navigator or PCP practice. Care Management Interventions  PCP Verified by CM: Yes  Mode of Transport at Discharge:  Other (see comment) ()  Transition of Care Consult (CM Consult): Discharge Planning  Health Maintenance Reviewed: Yes  Current Support Network: Lives with Spouse  Confirm Follow Up Transport: Family  Discharge Location  Discharge Placement: Home

## 2017-07-17 NOTE — ROUTINE PROCESS
Verbal shift change report given to Meenu Gill (oncoming nurse) by Shira Vasquez   (offgoing nurse). Report included the following information SBAR, Kardex and MAR.

## 2017-07-17 NOTE — PROGRESS NOTES
0745   Pt is awake, alert, oriented x4. Ambulated to BR. Pt had small to moderate amt of soft formed non watery BM . 10:44 AM Up ad george. Now resting in bed. On contact isolation for c diff. Lungs are clear. Abdomen soft with active BS.

## 2017-07-17 NOTE — PROGRESS NOTES
Shift summary: uneventful  Flagyl PO changed to  x4 daily for Cdiff regimen  . Pt. Up at george. Scd's in use. Family visited throughout shift. Eating and tolerating well. Gas pains relieved with Mylicon prn. Pt. Is looking forward to discharge tomorrow. Night nurse aware pt. Would like Ambien at bedtime tonight.      Patient Vitals for the past 12 hrs:   Temp Pulse Resp BP SpO2   07/16/17 2047 98.3 °F (36.8 °C) 69 18 128/64 100 %   07/16/17 1537 97.8 °F (36.6 °C) 79 18 124/72 99 %   07/16/17 1224 97.5 °F (36.4 °C) 96 16 126/72 100 %   07/16/17 0913 - - - - 95 %

## 2017-07-17 NOTE — ROUTINE PROCESS
1135  Bedside and Verbal shift change report given to DORON camacho RN (oncoming nurse) by De Martin  (offgoing nurse). Report included the following information SBAR, Kardex and MAR.

## 2017-07-19 ENCOUNTER — HOSPITAL ENCOUNTER (EMERGENCY)
Age: 72
Discharge: HOME | End: 2017-07-19
Attending: FAMILY MEDICINE
Payer: MEDICARE

## 2017-07-19 VITALS
RESPIRATION RATE: 20 BRPM | TEMPERATURE: 98.2 F | DIASTOLIC BLOOD PRESSURE: 74 MMHG | SYSTOLIC BLOOD PRESSURE: 131 MMHG | OXYGEN SATURATION: 100 % | HEART RATE: 95 BPM

## 2017-07-19 DIAGNOSIS — T50.905A ADVERSE EFFECTS OF MEDICATION, INITIAL ENCOUNTER: Primary | ICD-10-CM

## 2017-07-19 DIAGNOSIS — L30.9 DERMATITIS: ICD-10-CM

## 2017-07-19 PROCEDURE — 99284 EMERGENCY DEPT VISIT MOD MDM: CPT

## 2017-07-19 PROCEDURE — 74011250636 HC RX REV CODE- 250/636: Performed by: PHYSICIAN ASSISTANT

## 2017-07-19 PROCEDURE — 96375 TX/PRO/DX INJ NEW DRUG ADDON: CPT

## 2017-07-19 PROCEDURE — 96374 THER/PROPH/DIAG INJ IV PUSH: CPT

## 2017-07-19 RX ORDER — DIPHENHYDRAMINE HYDROCHLORIDE 50 MG/ML
25 INJECTION, SOLUTION INTRAMUSCULAR; INTRAVENOUS
Status: COMPLETED | OUTPATIENT
Start: 2017-07-19 | End: 2017-07-19

## 2017-07-19 RX ORDER — LIDOCAINE HYDROCHLORIDE 20 MG/ML
5 SOLUTION OROPHARYNGEAL
Qty: 1 BOTTLE | Refills: 0 | Status: SHIPPED | OUTPATIENT
Start: 2017-07-19 | End: 2018-03-16

## 2017-07-19 RX ADMIN — DIPHENHYDRAMINE HYDROCHLORIDE 25 MG: 50 INJECTION, SOLUTION INTRAMUSCULAR; INTRAVENOUS at 18:41

## 2017-07-19 RX ADMIN — METHYLPREDNISOLONE SODIUM SUCCINATE 125 MG: 125 INJECTION, POWDER, FOR SOLUTION INTRAMUSCULAR; INTRAVENOUS at 18:40

## 2017-07-19 NOTE — ED NOTES
Bedside and Verbal shift change report given to IRA Reddy   (oncoming nurse) by Reta Holm RN   (offgoing nurse). Report included the following information SBAR, Kardex, ED Summary, Intake/Output, MAR and Med Rec Status.

## 2017-07-19 NOTE — DISCHARGE INSTRUCTIONS
Drug Allergy: Care Instructions  Your Care Instructions  A drug allergy occurs when your immune system overreacts to something in a medicine. This causes an allergic reaction. You may have skin problems, such as hives or a rash. Your lips, mouth, and throat may swell. You may have trouble breathing. And you may have belly pain, nausea, vomiting, or diarrhea. A reaction can range from mild to life-threatening. After you have an allergic reaction to a medicine, you may always be allergic to that medicine and to others like it. Drug allergies are different than side effects and drug interactions. Side effects are bad reactions to a medicine. Drug interactions occur when two or more medicines that you take do not get along in your body. Some people may confuse these with drug allergies. Follow-up care is a key part of your treatment and safety. Be sure to make and go to all appointments, and call your doctor if you are having problems. It's also a good idea to know your test results and keep a list of the medicines you take. How can you care for yourself at home? · Your doctor may prescribe a shot of epinephrine to carry with you in case you have a severe reaction. Learn how to give yourself the shot, and keep it with you at all times. Make sure it has not . · Go to the emergency room every time you have a severe reaction. Go even if you have used your shot of epinephrine and are feeling better. Symptoms can come back after a shot. · Be safe with medicines. If you were given a medicine for your allergic reaction, take it exactly as directed. Call your doctor if you think you are having a problem with your medicine. · Avoid medicines like the one that caused your allergy. Ask your doctor or pharmacist if you think you may be taking a similar medicine. · If you have a mild skin rash or itching from your allergy:  ¨ Wear light clothing that does not irritate your skin. ¨ Use calamine lotion.  Or take an over-the-counter antihistamine, such as diphenhydramine (Benadryl) or loratadine (Claritin). Read and follow the instructions on the label. ¨ Take a cool shower or bath. ¨ Do not use strong soaps, detergents, and other chemicals. They can make itching worse. · Wear medical alert jewelry that lists your allergies. You can buy this at most drugstores. · Be sure that anyone treating you for any health problem knows that you are allergic to this medicine. When should you call for help? Give an epinephrine shot if:  · You think you are having a severe allergic reaction. After giving an epinephrine shot call 911, even if you feel better. Call 911 if:  · You have symptoms of a severe allergic reaction. These may include:  ¨ Sudden raised, red areas (hives) all over your body. ¨ Swelling of the throat, mouth, lips, or tongue. ¨ Trouble breathing. ¨ Passing out (losing consciousness). Or you may feel very lightheaded or suddenly feel weak, confused, or restless. · You have been given an epinephrine shot, even if you feel better. Call your doctor now or seek immediate medical care if:  · You have symptoms of an allergic reaction, such as:  ¨ A rash or hives (raised, red areas on the skin). ¨ Itching. ¨ Swelling. ¨ Belly pain, nausea, or vomiting. Watch closely for changes in your health, and be sure to contact your doctor if:  · You do not get better as expected. Where can you learn more? Go to http://isabel-kaleb.info/. Enter R132 in the search box to learn more about \"Drug Allergy: Care Instructions. \"  Current as of: April 3, 2017  Content Version: 11.3  © 9616-1811 [employer]. Care instructions adapted under license by [employer] (which disclaims liability or warranty for this information).  If you have questions about a medical condition or this instruction, always ask your healthcare professional. Maxi Baez disclaims any warranty or liability for your use of this information.

## 2017-07-19 NOTE — ED TRIAGE NOTES
Patient with complaints of a rash to the right side of abdomen and a burning sensation on her tongue. Sepsis Screening completed    (  )Patient meets SIRS criteria. ( x )Patient does not meet SIRS criteria.       SIRS Criteria is achieved when two or more of the following are present   Temperature < 96.8°F (36°C) or > 100.9°F (38.3°C)   Heart Rate > 90 beats per minute   Respiratory Rate > 20 breaths per minute   WBC count > 12,000 or <4,000 or > 10% bands

## 2017-07-21 ENCOUNTER — TELEPHONE (OUTPATIENT)
Dept: ONCOLOGY | Age: 72
End: 2017-07-21

## 2017-07-21 NOTE — TELEPHONE ENCOUNTER
Patient states she is feeling tired, but feeling much better. Joints hurt because I'm not walking as much. Denies fever, nausea, or other complaints.

## 2017-07-29 NOTE — DISCHARGE SUMMARY
GYN Discharge Summary                        Patient ID:  Robin Danielson  73 y.o. Admission Date: 7/14/2017  Discharge Date:  7/17/2017                                                 Attending: Garcia Ibrahim MD   PCP: Merita Cockayne, MD                                                                                               Reason for admission: diarrhea    Discharge  diagnosis: Active Problems:    Diarrhea (7/14/2017)      Dehydration (7/14/2017)        Associated Conditions:        Patient Active Problem List   Diagnosis Code    Calculus of kidney N20.0    Right Flank Pain R10.11    Chest pain at rest R07.9    Diarrhea R19.7    Dehydration E86.0              Past Medical History:   Diagnosis Date    Arthritis     Asthma     Cataract     Gall stones     GERD (gastroesophageal reflux disease)     Hay fever     Hiatal hernia with gastroesophageal reflux disease and esophagitis     Hyperlipidemia     Hypertension     Ill-defined condition     kidney stones    Kidney stones     bi-lateral    Nausea & vomiting     Psychiatric disorder     anxiety    Recurrent UTI     Sinusitis     Thyroid disease     thyroid nodules       Operative Procedure: none    Complications:  none                   Transfusion:  none    Hospital Course: Stool culture positive for c.diff. Patient was started on oral Flagyl.      Condition at discharge: stable, Afebrile, Ambulating and Eating, Drinking, Voiding    Labs:  Lab Results   Component Value Date/Time    WBC 8.7 07/17/2017 02:50 AM    HGB 11.4 07/17/2017 02:50 AM    HCT 33.2 07/17/2017 02:50 AM    PLATELET 877 74/84/3363 02:50 AM    MCV 91.2 07/17/2017 02:50 AM     Lab Results   Component Value Date/Time    Sodium 142 07/17/2017 02:50 AM    Potassium 3.7 07/17/2017 02:50 AM    Chloride 106 07/17/2017 02:50 AM    CO2 28 07/17/2017 02:50 AM    Anion gap 8 07/17/2017 02:50 AM    Glucose 99 07/17/2017 02:50 AM    BUN 2 07/17/2017 02:50 AM    Creatinine 0.74 07/17/2017 02:50 AM    BUN/Creatinine ratio 3 07/17/2017 02:50 AM    GFR est AA >60 07/17/2017 02:50 AM    GFR est non-AA >60 07/17/2017 02:50 AM             Patient Instructions:        Disposition:  Home    Follow-up:  Follow up with Dr. Kati Nunes in 2 weeks    Author:   Morena Hilario MD  Gynecologic Oncology  7/29/2017  8:08 AM

## 2017-08-08 ENCOUNTER — OFFICE VISIT (OUTPATIENT)
Dept: ONCOLOGY | Age: 72
End: 2017-08-08

## 2017-08-08 VITALS
WEIGHT: 145.2 LBS | OXYGEN SATURATION: 98 % | BODY MASS INDEX: 24.19 KG/M2 | HEART RATE: 96 BPM | DIASTOLIC BLOOD PRESSURE: 80 MMHG | TEMPERATURE: 97.8 F | HEIGHT: 65 IN | SYSTOLIC BLOOD PRESSURE: 133 MMHG

## 2017-08-08 DIAGNOSIS — N85.01 ENDOMETRIAL HYPERPLASIA, COMPLEX: Primary | ICD-10-CM

## 2017-08-08 NOTE — PROGRESS NOTES
[facility]  [address] [place] [address], [address], [address] [facility]  [address] [facility], [address] [place] [address]  [place], [address]   [phone]  Justin Gloria DO      Postoperative Office Note  Patient ID:  Name: Ivy Oliver  MRM: [id number]  : 1945/71 y.o. Date: 2017    SUBJECTIVE:    This is a 73 y.o.  female who presents s/p total laparoscopic hysterectomy, bilateral salpingo-oophorectomy, lysis of adhesions on 2017. Pts post op course was complicated by c. Diff colitis. She has finished her antibiotics and feeling much better. No diarrhea. Appetite is returning. Still having some fatigue. Denies vaginal bleeding. Her pathology revealed      TOTAL HYSTERECTOMY / BILATERAL SALPINGO-OOPHORECTOMY:   CERVIX: NO ABNORMALITY SEEN.   ENDOMETRIUM: FOCAL COMPLEX HYPERPLASIA WITH ATYPIA. MYOMETRIUM: NO ABNORMALITY SEEN. SEROSA: ENDOMETRIOSIS. BILATERAL TUBES: NO ABNORMALITY SEEN. RIGHT OVARY: BENIGN CORTICAL CYSTS. LEFT OVARY: ENDOMETRIOSIS. Washings, Pelvic   Satisfactory for evaluation. No malignant cells found. Medications:     Current Outpatient Prescriptions on File Prior to Visit   Medication Sig Dispense Refill    magnesium 250 mg tab Take  by mouth.  POTASSIUM PO Take 10 mEq by mouth daily.  famotidine (PEPCID) 20 mg tablet Take 20 mg by mouth as needed.  montelukast (SINGULAIR) 10 mg tablet Take 10 mg by mouth daily as needed.  ERGOCALCIFEROL, VITAMIN D2, (VITAMIN D2 PO) Take 1 Tab by mouth daily.  amLODIPine (NORVASC) 2.5 mg tablet Take 2.5 mg by mouth nightly.  aspirin delayed-release 81 mg tablet Take  by mouth daily.  butalbital-aspirin-caffeine (FIORINAL) capsule Take 1 Cap by mouth every four (4) hours as needed for Pain.  multivitamin (ONE A DAY) tablet Take 1 Tab by mouth daily.  Cetirizine 10 mg cap Take 10 mg by mouth daily.       valACYclovir (VALTREX) 500 mg tablet Take 500 mg by mouth two (2) times a day.  levocetirizine (XYZAL) 5 mg tablet Take 5 mg by mouth daily as needed for Allergies.  Dexlansoprazole (DEXILANT) 30 mg CpDM Take 30 mg by mouth daily.  dicyclomine (BENTYL) 20 mg tablet Take 20 mg by mouth two (2) times a day.  eszopiclone (LUNESTA) 2 mg tablet Take 2 mg by mouth nightly.  clonazePAM (KLONOPIN) 1 mg tablet Take 1 mg by mouth daily. Indications: PANIC DISORDER      simvastatin (ZOCOR) 40 mg tablet Take 40 mg by mouth nightly.  lidocaine (LIDOCAINE VISCOUS) 2 % solution Take 5 mL by mouth four (4) times daily as needed for Pain. Mix with equal parts water. Swish and swallow. 1 Bottle 0    b complex-vitamin c-folic acid (NEPHROCAPS) 1 mg capsule Take 1 Cap by mouth daily. No current facility-administered medications on file prior to visit. Allergies:      Allergies   Allergen Reactions    Acetaminophen Unable to Obtain    Amoxicillin Hives    Aspirin Hives    Cephalexin Hives and Nausea and Vomiting    Ciprofloxacin Other (comments)     GI distress    Clavulanic Acid Unknown (comments)    Codeine Hives    Doxycycline Hives    Eryc [Erythromycin] Other (comments)     Gi ditress    Formaldehyde Unknown (comments)    Gold Sodium Thiomalate Unknown (comments)    Hydrocodone Unknown (comments)    Hydrocodone-Acetaminophen Rash    Meperidine Hives    Neomycin Other (comments)    Nsaids (Non-Steroidal Anti-Inflammatory Drug) Unknown (comments)    Oxycodone Unknown (comments)    Penicillin G Hives    Penicillin G Potassium In D5w Unknown (comments)    Percocet [Oxycodone-Acetaminophen] Other (comments)     GI    Potassium Unknown (comments)    Prolia [Denosumab] Hives    Sulfa (Sulfonamide Antibiotics) Hives    Sulfanilamide Unknown (comments)       OBJECTIVE:    Vitals:   Visit Vitals    /80 (BP 1 Location: Right arm, BP Patient Position: Sitting)    Pulse 96    Temp 97.8 °F (36.6 °C) (Oral)    Ht 5' 5\" (1.651 m)    Wt 65.9 kg (145 lb 3.2 oz)    SpO2 98%    BMI 24.16 kg/m2       Physical Examination:    General:  alert, cooperative, no distress   Abdomen: Soft, NT, bowel sounds active, non-tender   Incision: Healed well   Pelvic: NEFG, spec exam revealed atrophic mucosa and vaginal cuff intact  BME revealed cuff intact, NT   Rectal: not done   Extremity:   extremities normal, atraumatic, no cyanosis or edema     IMPRESSION/PLAN:    Layman Ambrose is doing well She has a working diagnosis of CAH, endometriosis.  Post op course complicated by c diff colitis which has resolved  -reviewed her pathology and no need for further f/u with gyn onc  -will f/u with dr. [facility]  8/8/2017/9:40 AM

## 2017-08-08 NOTE — PROGRESS NOTES
Rose Arias is a 73 y.o. female is here for Surgical Follow-up (hysterectomy post-op following course of antibiotics.)    Patient denies any abdominal or pelvic pain. Complains of lower back pain. No unusual bleeding, discharge, irritation. \"pasty bowel movements\", usual bladder habits. No other complaints.

## 2017-08-15 ENCOUNTER — HOSPITAL ENCOUNTER (EMERGENCY)
Age: 72
Discharge: HOME | End: 2017-08-15
Attending: EMERGENCY MEDICINE
Payer: MEDICARE

## 2017-08-15 VITALS
RESPIRATION RATE: 16 BRPM | DIASTOLIC BLOOD PRESSURE: 73 MMHG | HEIGHT: 66 IN | OXYGEN SATURATION: 100 % | BODY MASS INDEX: 23.3 KG/M2 | HEART RATE: 80 BPM | WEIGHT: 145 LBS | TEMPERATURE: 98.2 F | SYSTOLIC BLOOD PRESSURE: 134 MMHG

## 2017-08-15 DIAGNOSIS — R19.7 DIARRHEA OF PRESUMED INFECTIOUS ORIGIN: Primary | ICD-10-CM

## 2017-08-15 LAB
ALBUMIN SERPL BCP-MCNC: 3.8 G/DL (ref 3.4–5)
ALBUMIN/GLOB SERPL: 1.2 {RATIO} (ref 0.8–1.7)
ALP SERPL-CCNC: 92 U/L (ref 45–117)
ALT SERPL-CCNC: 38 U/L (ref 13–56)
ANION GAP BLD CALC-SCNC: 10 MMOL/L (ref 3–18)
APPEARANCE UR: CLEAR
AST SERPL W P-5'-P-CCNC: 23 U/L (ref 15–37)
BACTERIA URNS QL MICRO: NEGATIVE /HPF
BASOPHILS # BLD: 0.1 K/UL (ref 0–0.06)
BASOPHILS NFR BLD: 1 % (ref 0–2)
BILIRUB SERPL-MCNC: 0.5 MG/DL (ref 0.2–1)
BILIRUB UR QL: NEGATIVE
BUN SERPL-MCNC: 14 MG/DL (ref 7–18)
BUN/CREAT SERPL: 18 (ref 12–20)
CALCIUM SERPL-MCNC: 9.5 MG/DL (ref 8.5–10.1)
CHLORIDE SERPL-SCNC: 105 MMOL/L (ref 100–108)
CO2 SERPL-SCNC: 28 MMOL/L (ref 21–32)
COLOR UR: YELLOW
CREAT SERPL-MCNC: 0.79 MG/DL (ref 0.6–1.3)
DIFFERENTIAL METHOD BLD: ABNORMAL
EOSINOPHIL # BLD: 0.1 K/UL (ref 0–0.4)
EOSINOPHIL NFR BLD: 2 % (ref 0–5)
EPITH CASTS URNS QL MICRO: NEGATIVE /LPF (ref 0–5)
ERYTHROCYTE [DISTWIDTH] IN BLOOD BY AUTOMATED COUNT: 13 % (ref 11.6–14.5)
GLOBULIN SER CALC-MCNC: 3.3 G/DL (ref 2–4)
GLUCOSE SERPL-MCNC: 102 MG/DL (ref 74–99)
GLUCOSE UR STRIP.AUTO-MCNC: NEGATIVE MG/DL
HCT VFR BLD AUTO: 36.2 % (ref 35–45)
HGB BLD-MCNC: 12.6 G/DL (ref 12–16)
HGB UR QL STRIP: NEGATIVE
KETONES UR QL STRIP.AUTO: NEGATIVE MG/DL
LEUKOCYTE ESTERASE UR QL STRIP.AUTO: ABNORMAL
LIPASE SERPL-CCNC: 135 U/L (ref 73–393)
LYMPHOCYTES # BLD: 2 K/UL (ref 0.9–3.6)
LYMPHOCYTES NFR BLD: 38 % (ref 21–52)
MAGNESIUM SERPL-MCNC: 1.6 MG/DL (ref 1.6–2.6)
MCH RBC QN AUTO: 32.6 PG (ref 24–34)
MCHC RBC AUTO-ENTMCNC: 34.8 G/DL (ref 31–37)
MCV RBC AUTO: 93.8 FL (ref 74–97)
MONOCYTES # BLD: 0.5 K/UL (ref 0.05–1.2)
MONOCYTES NFR BLD: 10 % (ref 3–10)
NEUTS SEG # BLD: 2.6 K/UL (ref 1.8–8)
NEUTS SEG NFR BLD: 49 % (ref 40–73)
NITRITE UR QL STRIP.AUTO: NEGATIVE
PH UR STRIP: 7 [PH] (ref 5–8)
PLATELET # BLD AUTO: 279 K/UL (ref 135–420)
PMV BLD AUTO: 9.4 FL (ref 9.2–11.8)
POTASSIUM SERPL-SCNC: 3.5 MMOL/L (ref 3.5–5.5)
PROT SERPL-MCNC: 7.1 G/DL (ref 6.4–8.2)
PROT UR STRIP-MCNC: NEGATIVE MG/DL
RBC # BLD AUTO: 3.86 M/UL (ref 4.2–5.3)
RBC #/AREA URNS HPF: NEGATIVE /HPF (ref 0–5)
SODIUM SERPL-SCNC: 143 MMOL/L (ref 136–145)
SP GR UR REFRACTOMETRY: 1 (ref 1–1.03)
UROBILINOGEN UR QL STRIP.AUTO: 0.2 EU/DL (ref 0.2–1)
WBC # BLD AUTO: 5.3 K/UL (ref 4.6–13.2)
WBC URNS QL MICRO: NORMAL /HPF (ref 0–5)

## 2017-08-15 PROCEDURE — 83735 ASSAY OF MAGNESIUM: CPT | Performed by: EMERGENCY MEDICINE

## 2017-08-15 PROCEDURE — 81001 URINALYSIS AUTO W/SCOPE: CPT | Performed by: EMERGENCY MEDICINE

## 2017-08-15 PROCEDURE — 96365 THER/PROPH/DIAG IV INF INIT: CPT

## 2017-08-15 PROCEDURE — 74011250636 HC RX REV CODE- 250/636: Performed by: EMERGENCY MEDICINE

## 2017-08-15 PROCEDURE — 99283 EMERGENCY DEPT VISIT LOW MDM: CPT

## 2017-08-15 PROCEDURE — 80053 COMPREHEN METABOLIC PANEL: CPT | Performed by: EMERGENCY MEDICINE

## 2017-08-15 PROCEDURE — 96361 HYDRATE IV INFUSION ADD-ON: CPT

## 2017-08-15 PROCEDURE — 74011000250 HC RX REV CODE- 250: Performed by: EMERGENCY MEDICINE

## 2017-08-15 PROCEDURE — 85025 COMPLETE CBC W/AUTO DIFF WBC: CPT | Performed by: EMERGENCY MEDICINE

## 2017-08-15 PROCEDURE — 83690 ASSAY OF LIPASE: CPT | Performed by: EMERGENCY MEDICINE

## 2017-08-15 RX ORDER — SODIUM CHLORIDE 0.9 % (FLUSH) 0.9 %
5-10 SYRINGE (ML) INJECTION EVERY 8 HOURS
Status: DISCONTINUED | OUTPATIENT
Start: 2017-08-15 | End: 2017-08-15 | Stop reason: HOSPADM

## 2017-08-15 RX ORDER — METRONIDAZOLE 500 MG/100ML
500 INJECTION, SOLUTION INTRAVENOUS
Status: COMPLETED | OUTPATIENT
Start: 2017-08-15 | End: 2017-08-15

## 2017-08-15 RX ORDER — METRONIDAZOLE 500 MG/1
500 TABLET ORAL 3 TIMES DAILY
Qty: 42 TAB | Refills: 0 | Status: SHIPPED | OUTPATIENT
Start: 2017-08-15 | End: 2017-08-29

## 2017-08-15 RX ORDER — SODIUM CHLORIDE 0.9 % (FLUSH) 0.9 %
5-10 SYRINGE (ML) INJECTION AS NEEDED
Status: DISCONTINUED | OUTPATIENT
Start: 2017-08-15 | End: 2017-08-15 | Stop reason: HOSPADM

## 2017-08-15 RX ADMIN — SODIUM CHLORIDE 1000 ML: 900 INJECTION, SOLUTION INTRAVENOUS at 15:42

## 2017-08-15 RX ADMIN — METRONIDAZOLE 500 MG: 500 INJECTION, SOLUTION INTRAVENOUS at 16:48

## 2017-08-15 NOTE — ED NOTES
Amb into ed w/  - reports recent hospitalization for + c diff 7/15 - improved after IV antibiotics - then onset lat pm chills/fevers - followed this am by 2 soft stools and 1 loose foul smelling stool. Pt went to PCP office - gave stool sample there this am and was told to come to the ED for a jump on things.

## 2017-08-15 NOTE — ED TRIAGE NOTES
Patient was diagnosed with C-diff on July 15th and placed on flagyl. Patient states that she finished all the medication. Patient states that she started with a chill, fever and soft stools last night. Patient was seen by Dr. Chace Tovar and gave a stool and urine sample. Sepsis Screening completed    (  )Patient meets SIRS criteria. (x  )Patient does not meet SIRS criteria.       SIRS Criteria is achieved when two or more of the following are present   Temperature < 96.8°F (36°C) or > 100.9°F (38.3°C)   Heart Rate > 90 beats per minute   Respiratory Rate > 20 breaths per minute   WBC count > 12,000 or <4,000 or > 10% bands

## 2017-08-15 NOTE — ED PROVIDER NOTES
Date: 8/15/2017   Patient Name: Franck Bejarano   YOB: 1945  Medical Record Number: [id number]    HISTORY OF PRESENTING ILLNESS / COMPLAINT     Chief Complaint   Patient presents with    Diarrhea        History Provided By:  patient    Barriers to Obtaining History:  NONE    Additional History:   3:12 PM   Frnack Bejarano is a 73 y.o. female with PMHX C. Diff and HTN who presents to the emergency department C/O malodorous diarrhea x3 that \"smells like C. diff\", onset today. Associated sxs include chills, low grade fever (99.7 F), and dysuria. Took Tylenol with good relief. Pt states she was dx'ed with C. Diff on 7/15/17 s/p total hysterectomy on 6/26/17 and was rx'ed Flagyl. Pt was seen by her OB/GYN, Dr. Analilia Meier, this morning and gave a stool sample, but was told she would not get the results for 2 days and pt states \"I can't wait that long. \" Pt notes that she was seen by her PCP a couple weeks ago for low potassium, rx'ed oral potassium, and labs done last week during her follow up appointment showed normal potassium levels. Pt denies changes in diet, cough, SOB, increased urinary frequency, and any other sxs or complaints.      Primary Care Provider: Girish Ley MD   Specialist:    PAST HISTORY     Past Medical History:   Past Medical History:   Diagnosis Date    Arthritis     Asthma     Cataract     Gall stones     GERD (gastroesophageal reflux disease)     Hay fever     Hiatal hernia with gastroesophageal reflux disease and esophagitis     Hyperlipidemia     Hypertension     Ill-defined condition     kidney stones    Kidney stones     bi-lateral    Nausea & vomiting     Psychiatric disorder     anxiety    Recurrent UTI     Sinusitis     Thyroid disease     thyroid nodules        Past Surgical History:   Past Surgical History:   Procedure Laterality Date    HX APPENDECTOMY      HX CATARACT REMOVAL Bilateral     HX CHOLECYSTECTOMY      HX GYN  1980    uterine suspension    HX KNEE ARTHROSCOPY      HX LITHOTRIPSY      HX ORTHOPAEDIC Right     trigger finger release    HX OTHER SURGICAL      cystourethroscopy        Family History:   Family History   Problem Relation Age of Onset    Heart Disease Mother     Cancer Father     Hypertension Father         Social History:   Social History   Substance Use Topics    Smoking status: Never Smoker    Smokeless tobacco: Never Used    Alcohol use No        Allergies: Allergies   Allergen Reactions    Acetaminophen Unable to Obtain    Amoxicillin Hives    Aspirin Hives    Cephalexin Hives and Nausea and Vomiting    Ciprofloxacin Other (comments)     GI distress    Clavulanic Acid Unknown (comments)    Codeine Hives    Doxycycline Hives    Eryc [Erythromycin] Other (comments)     Gi ditress    Formaldehyde Unknown (comments)    Gold Sodium Thiomalate Unknown (comments)    Hydrocodone Unknown (comments)    Hydrocodone-Acetaminophen Rash    Meperidine Hives    Neomycin Other (comments)    Nsaids (Non-Steroidal Anti-Inflammatory Drug) Unknown (comments)    Oxycodone Unknown (comments)    Penicillin G Hives    Penicillin G Potassium In D5w Unknown (comments)    Percocet [Oxycodone-Acetaminophen] Other (comments)     GI    Potassium Unknown (comments)    Prolia [Denosumab] Hives    Sulfa (Sulfonamide Antibiotics) Hives    Sulfanilamide Unknown (comments)        Review of Systems   Review of Systems   Constitutional: Positive for chills and fever. Negative for appetite change. HENT: Negative for congestion, rhinorrhea and sore throat. Eyes: Negative for pain, discharge and visual disturbance. Respiratory: Negative for cough, chest tightness, shortness of breath and wheezing. Cardiovascular: Negative for chest pain and leg swelling. Gastrointestinal: Positive for diarrhea. Negative for abdominal pain, nausea and vomiting. Endocrine: Negative for polydipsia and polyuria. Genitourinary: Positive for dysuria. Negative for difficulty urinating, frequency, hematuria, menstrual problem, pelvic pain, urgency, vaginal bleeding and vaginal discharge. Musculoskeletal: Negative for arthralgias, back pain and myalgias. Skin: Negative for color change. Neurological: Negative for weakness, numbness and headaches. Hematological: Does not bruise/bleed easily. Psychiatric/Behavioral: Negative for decreased concentration. All other systems reviewed and are negative. Physical Exam  Vitals:    08/15/17 1449 08/15/17 1651   BP: 135/69 134/73   Pulse: 96 80   Resp: 20 16   Temp: 98.2 °F (36.8 °C)    SpO2: 100% 100%   Weight: 65.8 kg (145 lb)    Height: 5' 6\" (1.676 m)        Physical Exam   Nursing note and vitals reviewed. -------------------------PHYSICAL EXAM-------------------------    Vital signs and nursing notes reviewed  Nursing note and VS were reviewed      CONSTITUTIONAL: Mental status: Awake and alert. No immediate acute distress. Non-toxic in appearance. Well developed, well nourished and appears adequately hydrated. HEAD: Normocephalic, Atraumatic. EYES: Pupils are equal, round and reactive. Extra-ocular movements intact. Sclera are non icteric. Conjunctiva not injected. ENT: Mucous membranes are tacky. Oral mucosa: There is no erythema or swelling; No oral lesions or thrush. Tonsillar tissue: NO enlargement of the tonsillar tissue or the presence of exudates. Ears: R:  EAC - clear, no swelling with TM that is normal in appearance. L:  EAC - clear, no swelling with TM that is normal in appearance. Nasal mucosa pink with no discharge and the turbinates are of normal size. NECK: Normal ROM. Neck is supple. Anterior aspect: Anterior cervical adenopathy: nothing abnormal.  No obvious enlargement of the thyroid. Trachea is midline. Posterior aspect: Posterior cervical paraspinal muscles are non tender and  bony midline is non tender.  Posterior adenopathy: none palpable. CARDIOVASCULAR:  HEART- The rhythm is regular and the rate sounds to be normal. No murmurs, rubs or gallops. VASCULAR - Distal pulses are 2+ and equal.    PULMONARY: Patient is speaking in full sentences. Respiratory effort is normal and without the use of accessory muscles. Air exchange is good. Breath sounds:  Clear to auscultation bilaterally. No wheezing, rales or rhonchi. CHEST WALL: Normal shape;  non tender to palpation; no crepitus    ABDOMINAL: Visual: non -distended; flat  Ausculation: Bowel sounds are present. Palpation: Soft; No obvious organomegaly; Palpable tenderness: mild RLQ tenderness. NO peritoneal signs - No rebound, guarding or rigidity. BACK: Midline - No bony tenderness; Paraspinal muscles are non tender. Full range of motion. No CVA tenderness. MUSCULOSKELETAL:   Lower Extremities: Peripheral edema- none noted; In general there is No obvious soft tissue tenderness or sites of bony tenderness or deformities;   Joints: No evidence of acute inflammatory changes and have good range of motion in all extremities. Muscles: Good tone and No obvious muscle tenderness. Upper Extremities: Peripheral edema- none noted; In general there is No obvious soft tissue tenderness or sites of bony tenderness or deformities;   Joints: No evidence of acute inflammatory changes and have good range of motion in all extremities. Muscles: Good tone and No obvious muscle tenderness. SKIN:  Good skin turgor. Cap Refill is Normal. Skin is warm and dry and with NO diaphoresis. Rashes: NONE or nothing that appears infectious; NO petechiae. NO particular lesions. NEUROLOGICAL: Alert, awake and appropriately oriented. Normal speech. CN's are normal;  Motor - no focal weakness; no obvious sensory loss; Cerebellar function- intact; DTR's - 2+ equal    PSYCH: Appropriate affect; normal thought content; no expressed suicidal ideation.       INITIAL CLINICAL IMPRESSION and PLANS :  The patient presents with the primary complaint(s) of RECURRENT history of : diarrhea. The presentation, to include historical aspects and clinical findings appear to be consistent with the DX of recurrence of C. diff. However, other possible DX's to consider as primary, associated with, or exacerbated by include:    1.  colitis  2. Nonspecific diarrhea  3.  dehydration    My initial focus is to Determine the cause and extent of the problem and Initiate Treatment as Appropriate . Considering the above, my initial management plan to evaluate and therapeutic interventions include: Obtain Lab Studies, and Initiate Medications and or IV Fluids,  As well as those noted in the orders:      Julia Melgoza MD      [address] [place] [address] Ave:  I have reviewed past medical records with pertinent portions incorporated below. I reviewed the vital signs, available nursing notes, past medical history, past surgical history, family history and social history. I have spoken to other providers as described below. Old Medical Records: Old medical records. Nursing notes.          Pertinent Input from Medical Records:    Diagnostic Study Results:     Labs -      Recent Results (from the past 12 hour(s))   URINALYSIS W/ RFLX MICROSCOPIC    Collection Time: 08/15/17  3:07 PM   Result Value Ref Range    Color YELLOW      Appearance CLEAR      Specific gravity 1.005 1.005 - 1.030      pH (UA) 7.0 5.0 - 8.0      Protein NEGATIVE  NEG mg/dL    Glucose NEGATIVE  NEG mg/dL    Ketone NEGATIVE  NEG mg/dL    Bilirubin NEGATIVE  NEG      Blood NEGATIVE  NEG      Urobilinogen 0.2 0.2 - 1.0 EU/dL    Nitrites NEGATIVE  NEG      Leukocyte Esterase TRACE (A) NEG     URINE MICROSCOPIC ONLY    Collection Time: 08/15/17  3:07 PM   Result Value Ref Range    WBC 0 to 1 0 - 5 /hpf    RBC NEGATIVE  0 - 5 /hpf    Epithelial cells NEGATIVE  0 - 5 /lpf    Bacteria NEGATIVE  NEG /hpf   CBC WITH AUTOMATED DIFF    Collection Time: 08/15/17  3:30 PM   Result Value Ref Range    WBC 5.3 4.6 - 13.2 K/uL    RBC 3.86 (L) 4.20 - 5.30 M/uL    HGB 12.6 12.0 - 16.0 g/dL    HCT 36.2 35.0 - 45.0 %    MCV 93.8 74.0 - 97.0 FL    MCH 32.6 24.0 - 34.0 PG    MCHC 34.8 31.0 - 37.0 g/dL    RDW 13.0 11.6 - 14.5 %    PLATELET 199 332 - 835 K/uL    MPV 9.4 9.2 - 11.8 FL    NEUTROPHILS 49 40 - 73 %    LYMPHOCYTES 38 21 - 52 %    MONOCYTES 10 3 - 10 %    EOSINOPHILS 2 0 - 5 %    BASOPHILS 1 0 - 2 %    ABS. NEUTROPHILS 2.6 1.8 - 8.0 K/UL    ABS. LYMPHOCYTES 2.0 0.9 - 3.6 K/UL    ABS. MONOCYTES 0.5 0.05 - 1.2 K/UL    ABS. EOSINOPHILS 0.1 0.0 - 0.4 K/UL    ABS. BASOPHILS 0.1 (H) 0.0 - 0.06 K/UL    DF AUTOMATED     METABOLIC PANEL, COMPREHENSIVE    Collection Time: 08/15/17  3:30 PM   Result Value Ref Range    Sodium 143 136 - 145 mmol/L    Potassium 3.5 3.5 - 5.5 mmol/L    Chloride 105 100 - 108 mmol/L    CO2 28 21 - 32 mmol/L    Anion gap 10 3.0 - 18 mmol/L    Glucose 102 (H) 74 - 99 mg/dL    BUN 14 7.0 - 18 MG/DL    Creatinine 0.79 0.6 - 1.3 MG/DL    BUN/Creatinine ratio 18 12 - 20      GFR est AA >60 >60 ml/min/1.73m2    GFR est non-AA >60 >60 ml/min/1.73m2    Calcium 9.5 8.5 - 10.1 MG/DL    Bilirubin, total 0.5 0.2 - 1.0 MG/DL    ALT (SGPT) 38 13 - 56 U/L    AST (SGOT) 23 15 - 37 U/L    Alk. phosphatase 92 45 - 117 U/L    Protein, total 7.1 6.4 - 8.2 g/dL    Albumin 3.8 3.4 - 5.0 g/dL    Globulin 3.3 2.0 - 4.0 g/dL    A-G Ratio 1.2 0.8 - 1.7     MAGNESIUM    Collection Time: 08/15/17  3:30 PM   Result Value Ref Range    Magnesium 1.6 1.6 - 2.6 mg/dL   LIPASE    Collection Time: 08/15/17  3:30 PM   Result Value Ref Range    Lipase 135 73 - 393 U/L       Radiologic Studies -  The following have been ordered and reviewed:  No orders to display       ED COURSE:    Vital Signs-Reviewed the patient's vital signs.    Patient Vitals for the past 12 hrs:   Temp Pulse Resp BP SpO2   08/15/17 1651 - 80 16 134/73 100 %   08/15/17 1449 98.2 °F (36.8 °C) 96 20 135/69 100 %       Pulse Oximetry Analysis - Normal 100% on RA     Provider Notes:    1515  Initial assessment performed. I examined the patient and provided information regarding the scope of my initial clinical impression and plans for diagnostic and therapeutic intervention(s). I have encouraged them to ask questions as they arise throughout their visit. José Luis Payne MD    8222 After review of the past records and the initial lab results, my impression is that this presentation may represent the initial recurrence of C Diff. There are no markers suggesting severe infection. My plan is to provide IV hydration and initiated Flagyl for 10 days. I suspect that the patient will be able to be d/c'ed and followed by her GYN. Darrius Carr MD      Procedures:   Procedures    Medications Given in the ED:  Medications   sodium chloride (NS) flush 5-10 mL (not administered)   sodium chloride (NS) flush 5-10 mL (not administered)   metroNIDAZOLE (FLAGYL) IVPB premix 500 mg (500 mg IntraVENous New Bag 8/15/17 1648)   sodium chloride 0.9 % bolus infusion 1,000 mL (0 mL IntraVENous IV Completed 8/15/17 1642)         CONCLUDING NOTES:   I was the first provider for this patient. During the ED course I had re-evaluated the patient, answered their and /or their family's questions regarding my clinical impression, the patient's condition and plans for therapeutic interventions. The patient's ED course was uneventful and remained stable throughout. Response to Intervention(s):   IMPROVED      Unanticipated Developments: NONE       Critical Care Time:     CLINICAL IMPRESSION AND DISCUSSION:     Based on the clinical presentation, findings and results of diagnostic studies, as well as developments while in the ED,  I suspect the following:         For the presentation noted above    My clinical impression is: diarrhea of presumed infectious origin        DISCUSSION REGARDING CLINICAL IMPRESSION: The specifics regarding my clinical impression / diagnosis are as follows:        Specific Conversations:  NONE      DISPOSITION DECISION:     DISCHARGE: I feel that we have optimized outpatient assessment and management such that Ulysses Kale is stable to be discharged and to continue with her care or complete any additional evaluation as appropriate at home or as an outpatient. Preparations will be made to discharge the patient. Present condition at the time of disposition: STABLE    ANY SPECIFIC INFORMATION REGARDING THE DISPOSITION: NONE        DISCHARGE NOTE:    Eli Seen  results have been reviewed with her. She has been counseled regarding her diagnosis, treatment, and plan. She verbally conveys understanding and agreement of the signs, symptoms, diagnosis, treatment and prognosis and additionally agrees to follow up as discussed. She also agrees with the care-plan and conveys that all of her questions have been answered. I have also provided discharge instructions for her that include: educational information regarding their diagnosis and treatment, and list of reasons why they would want to return to the ED prior to their follow-up appointment, should her condition change. The patient and/or family has been provided with education for proper Emergency Department utilization. CLINICAL IMPRESSION  1. Diarrhea of presumed infectious origin        PLAN:  1. D/C home  2. Patient's Medications   Start Taking    METRONIDAZOLE (FLAGYL) 500 MG TABLET    Take 1 Tab by mouth three (3) times daily for 14 days. Continue Taking    AMLODIPINE (NORVASC) 2.5 MG TABLET    Take 2.5 mg by mouth nightly. ASPIRIN DELAYED-RELEASE 81 MG TABLET    Take  by mouth daily. B COMPLEX-VITAMIN C-FOLIC ACID (NEPHROCAPS) 1 MG CAPSULE    Take 1 Cap by mouth daily. BUTALBITAL-ASPIRIN-CAFFEINE (FIORINAL) CAPSULE    Take 1 Cap by mouth every four (4) hours as needed for Pain. CETIRIZINE 10 MG CAP    Take 10 mg by mouth daily.     CLONAZEPAM (KLONOPIN) 1 MG TABLET    Take 1 mg by mouth daily. Indications: PANIC DISORDER    DEXLANSOPRAZOLE (DEXILANT) 30 MG CPDM    Take 30 mg by mouth daily. DICYCLOMINE (BENTYL) 20 MG TABLET    Take 20 mg by mouth two (2) times a day. ERGOCALCIFEROL, VITAMIN D2, (VITAMIN D2 PO)    Take 1 Tab by mouth daily. ESZOPICLONE (LUNESTA) 2 MG TABLET    Take 2 mg by mouth nightly. FAMOTIDINE (PEPCID) 20 MG TABLET    Take 20 mg by mouth as needed. LEVOCETIRIZINE (XYZAL) 5 MG TABLET    Take 5 mg by mouth daily as needed for Allergies. LIDOCAINE (LIDOCAINE VISCOUS) 2 % SOLUTION    Take 5 mL by mouth four (4) times daily as needed for Pain. Mix with equal parts water. Swish and swallow. MAGNESIUM 250 MG TAB    Take  by mouth. MONTELUKAST (SINGULAIR) 10 MG TABLET    Take 10 mg by mouth daily as needed. MULTIVITAMIN (ONE A DAY) TABLET    Take 1 Tab by mouth daily. POTASSIUM PO    Take 10 mEq by mouth daily. SIMVASTATIN (ZOCOR) 40 MG TABLET    Take 40 mg by mouth nightly. VALACYCLOVIR (VALTREX) 500 MG TABLET    Take 500 mg by mouth two (2) times a day. These Medications have changed    No medications on file   Stop Taking    No medications on file     3. Follow-up Information     Follow up With Details Comments Contact Info    Mary Fagan MD Schedule an appointment as soon as possible for a visit in 2 days  [address]      [facility] EMERGENCY DEPT  As needed, If symptoms worsen [address] [place] [id number]  [phone]        Return if sxs worsen    ATTESTATIONS:  This note is prepared by Bailee Rodríguez, acting as Scribe for Chidi Clement MD.    Cihdi Clement MD: The scribe's documentation has been prepared under my direction and personally reviewed by me in its entirety. I confirm that the note above accurately reflects all work, treatment, procedures, and medical decision making performed by me.

## 2017-08-15 NOTE — DISCHARGE INSTRUCTIONS
Gastroenteritis: Care Instructions  Your Care Instructions  Gastroenteritis is an illness that may cause nausea, vomiting, and diarrhea. It is sometimes called \"stomach flu. \" It can be caused by bacteria or a virus. You will probably begin to feel better in 1 to 2 days. In the meantime, get plenty of rest and make sure you do not become dehydrated. Dehydration occurs when your body loses too much fluid. Follow-up care is a key part of your treatment and safety. Be sure to make and go to all appointments, and call your doctor if you are having problems. Its also a good idea to know your test results and keep a list of the medicines you take. How can you care for yourself at home? · If your doctor prescribed antibiotics, take them as directed. Do not stop taking them just because you feel better. You need to take the full course of antibiotics. · Drink plenty of fluids to prevent dehydration, enough so that your urine is light yellow or clear like water. Choose water and other caffeine-free clear liquids until you feel better. If you have kidney, heart, or liver disease and have to limit fluids, talk with your doctor before you increase your fluid intake. · Drink fluids slowly, in frequent, small amounts, because drinking too much too fast can cause vomiting. · Begin eating mild foods, such as dry toast, yogurt, applesauce, bananas, and rice. Avoid spicy, hot, or high-fat foods, and do not drink alcohol or caffeine for a day or two. Do not drink milk or eat ice cream until you are feeling better. How to prevent gastroenteritis  · Keep hot foods hot and cold foods cold. · Do not eat meats, dressings, salads, or other foods that have been kept at room temperature for more than 2 hours. · Use a thermometer to check your refrigerator. It should be between 34°F and 40°F.  · Defrost meats in the refrigerator or microwave, not on the kitchen counter. · Keep your hands and your kitchen clean.  Wash your hands, cutting boards, and countertops with hot soapy water frequently. · Cook meat until it is well done. · Do not eat raw eggs or uncooked sauces made with raw eggs. · Do not take chances. If food looks or tastes spoiled, throw it out. When should you call for help? Call 911 anytime you think you may need emergency care. For example, call if:  · You vomit blood or what looks like coffee grounds. · You passed out (lost consciousness). · You pass maroon or very bloody stools. Call your doctor now or seek immediate medical care if:  · You have severe belly pain. · You have signs of needing more fluids. You have sunken eyes, a dry mouth, and pass only a little dark urine. · You feel like you are going to faint. · You have increased belly pain that does not go away in 1 to 2 days. · You have new or increased nausea, or you are vomiting. · You have a new or higher fever. · Your stools are black and tarlike or have streaks of blood. Watch closely for changes in your health, and be sure to contact your doctor if:  · You are dizzy or lightheaded. · You urinate less than usual, or your urine is dark yellow or brown. · You do not feel better with each day that goes by. Where can you learn more? Go to http://isabel-kaleb.info/. Enter N142 in the search box to learn more about \"Gastroenteritis: Care Instructions. \"  Current as of: March 3, 2017  Content Version: 11.3  © 7559-4540 [employer]. Care instructions adapted under license by [employer] (which disclaims liability or warranty for this information). If you have questions about a medical condition or this instruction, always ask your healthcare professional. [place] [address] any warranty or liability for your use of this information.        Clostridium Difficile Colitis: Care Instructions  Your Care Instructions  Clostridium difficile (also called C. difficile) are bacteria that can cause swelling and irritation of the large intestine, or colon. This inflammation is also called colitis. It can cause diarrhea, fever, and belly cramps. You may get C. difficile colitis if you take antibiotics. The infection is most common in people who are taking antibiotics while in the hospital. It is also common in older people in hospitals and nursing homes. Severe disease could cause the colon to swell to many times its normal size (toxic megacolon). This can cause death and needs emergency treatment. You may have a swollen belly that is painful or tender, a rapid heartbeat, and a fever. Follow-up care is a key part of your treatment and safety. Be sure to make and go to all appointments, and call your doctor if you are having problems. It's also a good idea to know your test results and keep a list of the medicines you take. How can you care for yourself at home? · Your doctor may give you antibiotics to treat C. difficile colitis. If your doctor prescribes an antibiotic, he or she will give you a different antibiotic than the one that caused your infection. Take your antibiotics as directed. Do not stop taking them just because you feel better. You need to take the full course of antibiotics. · To prevent dehydration, drink plenty of fluids, enough so that your urine is light yellow or clear like water. Choose water and other caffeine-free clear liquids until you feel better. If you have kidney, heart, or liver disease and have to limit fluids, talk with your doctor before you increase the amount of fluids you drink. · Begin eating small amounts of mild foods, if you feel like it. Try yogurt that has live cultures of lactobacillus (check the label). ¨ Avoid spicy foods, fruits, alcohol, and caffeine until 48 hours after all symptoms go away. ¨ Avoid chewing gum that contains sorbitol.   ¨ Avoid dairy products (except for yogurt with lactobacillus) while you have diarrhea and for 3 days after symptoms go away.  · To prevent the spread of C. difficile, practice good hygiene. Keep your hands clean by washing them well and often with soap and clean, running water. Alcohol-based hand sanitizers do not kill C. difficile. When should you call for help? Call 911 if:  · You passed out (lost consciousness). Call your doctor now or seek immediate medical care if:  · You have a fever over 101°F or shaking chills. · You feel lightheaded or have a fast heart rate. · You pass stools that are almost always bloody. · You have signs of needing more fluids. You have sunken eyes and a dry mouth, and you pass only a little dark urine. · You have severe belly pain with or without bloating. · You have severe vomiting and cannot keep down liquids. · You are not passing any stools or gas. Watch closely for changes in your health, and be sure to contact your doctor if:  · You do not get better as expected. Where can you learn more? Go to http://isabel-kaleb.info/. Enter [phone] in the search box to learn more about \"Clostridium Difficile Colitis: Care Instructions. \"  Current as of: March 3, 2017  Content Version: 11.3  © 5802-9214 [employer]. Care instructions adapted under license by [employer] (which disclaims liability or warranty for this information). If you have questions about a medical condition or this instruction, always ask your healthcare professional. [address] any warranty or liability for your use of this information.

## 2017-08-24 PROBLEM — N84.0 POLYP OF CORPUS UTERI: Status: ACTIVE | Noted: 2017-05-30

## 2017-09-06 ENCOUNTER — HOSPITAL ENCOUNTER (EMERGENCY)
Age: 72
Discharge: HOME | End: 2017-09-06
Attending: EMERGENCY MEDICINE
Payer: MEDICARE

## 2017-09-06 VITALS
HEART RATE: 84 BPM | OXYGEN SATURATION: 100 % | TEMPERATURE: 97.8 F | HEIGHT: 66 IN | WEIGHT: 144 LBS | BODY MASS INDEX: 23.14 KG/M2 | SYSTOLIC BLOOD PRESSURE: 156 MMHG | DIASTOLIC BLOOD PRESSURE: 88 MMHG | RESPIRATION RATE: 14 BRPM

## 2017-09-06 DIAGNOSIS — R19.7 DIARRHEA, UNSPECIFIED TYPE: Primary | ICD-10-CM

## 2017-09-06 LAB
ALBUMIN SERPL-MCNC: 3.7 G/DL (ref 3.4–5)
ALBUMIN/GLOB SERPL: 1 {RATIO} (ref 0.8–1.7)
ALP SERPL-CCNC: 104 U/L (ref 45–117)
ALT SERPL-CCNC: 44 U/L (ref 13–56)
ANION GAP SERPL CALC-SCNC: 10 MMOL/L (ref 3–18)
AST SERPL-CCNC: 25 U/L (ref 15–37)
BACTERIA SPEC CULT: NORMAL
BASOPHILS # BLD: 0 K/UL (ref 0–0.06)
BASOPHILS NFR BLD: 1 % (ref 0–2)
BILIRUB SERPL-MCNC: 0.3 MG/DL (ref 0.2–1)
BUN SERPL-MCNC: 7 MG/DL (ref 7–18)
BUN/CREAT SERPL: 10 (ref 12–20)
CALCIUM SERPL-MCNC: 9.2 MG/DL (ref 8.5–10.1)
CHLORIDE SERPL-SCNC: 106 MMOL/L (ref 100–108)
CO2 SERPL-SCNC: 30 MMOL/L (ref 21–32)
CREAT SERPL-MCNC: 0.67 MG/DL (ref 0.6–1.3)
DIFFERENTIAL METHOD BLD: ABNORMAL
EOSINOPHIL # BLD: 0 K/UL (ref 0–0.4)
EOSINOPHIL NFR BLD: 1 % (ref 0–5)
ERYTHROCYTE [DISTWIDTH] IN BLOOD BY AUTOMATED COUNT: 12.7 % (ref 11.6–14.5)
GLOBULIN SER CALC-MCNC: 3.6 G/DL (ref 2–4)
GLUCOSE SERPL-MCNC: 103 MG/DL (ref 74–99)
HCT VFR BLD AUTO: 38 % (ref 35–45)
HGB BLD-MCNC: 13.1 G/DL (ref 12–16)
LIPASE SERPL-CCNC: 153 U/L (ref 73–393)
LYMPHOCYTES # BLD: 1.8 K/UL (ref 0.9–3.6)
LYMPHOCYTES NFR BLD: 39 % (ref 21–52)
MCH RBC QN AUTO: 32.3 PG (ref 24–34)
MCHC RBC AUTO-ENTMCNC: 34.5 G/DL (ref 31–37)
MCV RBC AUTO: 93.8 FL (ref 74–97)
MONOCYTES # BLD: 0.4 K/UL (ref 0.05–1.2)
MONOCYTES NFR BLD: 8 % (ref 3–10)
NEUTS SEG # BLD: 2.4 K/UL (ref 1.8–8)
NEUTS SEG NFR BLD: 51 % (ref 40–73)
PLATELET # BLD AUTO: 263 K/UL (ref 135–420)
PMV BLD AUTO: 9.5 FL (ref 9.2–11.8)
POTASSIUM SERPL-SCNC: 3.3 MMOL/L (ref 3.5–5.5)
PROT SERPL-MCNC: 7.3 G/DL (ref 6.4–8.2)
RBC # BLD AUTO: 4.05 M/UL (ref 4.2–5.3)
SERVICE CMNT-IMP: NORMAL
SODIUM SERPL-SCNC: 146 MMOL/L (ref 136–145)
WBC # BLD AUTO: 4.6 K/UL (ref 4.6–13.2)

## 2017-09-06 PROCEDURE — 99282 EMERGENCY DEPT VISIT SF MDM: CPT

## 2017-09-06 PROCEDURE — 85025 COMPLETE CBC W/AUTO DIFF WBC: CPT | Performed by: PHYSICIAN ASSISTANT

## 2017-09-06 PROCEDURE — 87493 C DIFF AMPLIFIED PROBE: CPT | Performed by: PHYSICIAN ASSISTANT

## 2017-09-06 PROCEDURE — 80053 COMPREHEN METABOLIC PANEL: CPT | Performed by: PHYSICIAN ASSISTANT

## 2017-09-06 PROCEDURE — 83690 ASSAY OF LIPASE: CPT | Performed by: PHYSICIAN ASSISTANT

## 2017-09-06 RX ORDER — METRONIDAZOLE 500 MG/1
500 TABLET ORAL 3 TIMES DAILY
Qty: 42 TAB | Refills: 0 | Status: SHIPPED | OUTPATIENT
Start: 2017-09-06 | End: 2017-09-20

## 2017-09-06 NOTE — DISCHARGE INSTRUCTIONS
Diarrhea: Care Instructions  Your Care Instructions    Diarrhea is loose, watery stools (bowel movements). The exact cause is often hard to find. Sometimes diarrhea is your body's way of getting rid of what caused an upset stomach. Viruses, food poisoning, and many medicines can cause diarrhea. Some people get diarrhea in response to emotional stress, anxiety, or certain foods. Almost everyone has diarrhea now and then. It usually isn't serious, and your stools will return to normal soon. The important thing to do is replace the fluids you have lost, so you can prevent dehydration. The doctor has checked you carefully, but problems can develop later. If you notice any problems or new symptoms, get medical treatment right away. Follow-up care is a key part of your treatment and safety. Be sure to make and go to all appointments, and call your doctor if you are having problems. It's also a good idea to know your test results and keep a list of the medicines you take. How can you care for yourself at home? · Watch for signs of dehydration, which means your body has lost too much water. Dehydration is a serious condition and should be treated right away. Signs of dehydration are:  ¨ Increasing thirst and dry eyes and mouth. ¨ Feeling faint or lightheaded. ¨ Darker urine, and a smaller amount of urine than normal.  · To prevent dehydration, drink plenty of fluids, enough so that your urine is light yellow or clear like water. Choose water and other caffeine-free clear liquids until you feel better. If you have kidney, heart, or liver disease and have to limit fluids, talk with your doctor before you increase the amount of fluids you drink. · Begin eating small amounts of mild foods the next day, if you feel like it. ¨ Try yogurt that has live cultures of Lactobacillus. (Check the label.)  ¨ Avoid spicy foods, fruits, alcohol, and caffeine until 48 hours after all symptoms are gone.   ¨ Avoid chewing gum that contains sorbitol. ¨ Avoid dairy products (except for yogurt with Lactobacillus) while you have diarrhea and for 3 days after symptoms are gone. · The doctor may recommend that you take over-the-counter medicine, such as loperamide (Imodium), if you still have diarrhea after 6 hours. Read and follow all instructions on the label. Do not use this medicine if you have bloody diarrhea, a high fever, or other signs of serious illness. Call your doctor if you think you are having a problem with your medicine. When should you call for help? Call 911 anytime you think you may need emergency care. For example, call if:  · You passed out (lost consciousness). · Your stools are maroon or very bloody. Call your doctor now or seek immediate medical care if:  · You are dizzy or lightheaded, or you feel like you may faint. · Your stools are black and look like tar, or they have streaks of blood. · You have new or worse belly pain. · You have symptoms of dehydration, such as:  ¨ Dry eyes and a dry mouth. ¨ Passing only a little dark urine. ¨ Feeling thirstier than usual.  · You have a new or higher fever. Watch closely for changes in your health, and be sure to contact your doctor if:  · Your diarrhea is getting worse. · You see pus in the diarrhea. · You are not getting better after 2 days (48 hours). Where can you learn more? Go to http://isabel-kaleb.info/. Enter T394 in the search box to learn more about \"Diarrhea: Care Instructions. \"  Current as of: March 20, 2017  Content Version: 11.3  © 9978-3435 [employer]. Care instructions adapted under license by [employer] (which disclaims liability or warranty for this information). If you have questions about a medical condition or this instruction, always ask your healthcare professional. [place] [address] any warranty or liability for your use of this information.        Clostridium Difficile Colitis: Care Instructions  Your Care Instructions  Clostridium difficile (also called C. difficile) are bacteria that can cause swelling and irritation of the large intestine, or colon. This inflammation is also called colitis. It can cause diarrhea, fever, and belly cramps. You may get C. difficile colitis if you take antibiotics. The infection is most common in people who are taking antibiotics while in the hospital. It is also common in older people in hospitals and nursing homes. Severe disease could cause the colon to swell to many times its normal size (toxic megacolon). This can cause death and needs emergency treatment. You may have a swollen belly that is painful or tender, a rapid heartbeat, and a fever. Follow-up care is a key part of your treatment and safety. Be sure to make and go to all appointments, and call your doctor if you are having problems. It's also a good idea to know your test results and keep a list of the medicines you take. How can you care for yourself at home? · Your doctor may give you antibiotics to treat C. difficile colitis. If your doctor prescribes an antibiotic, he or she will give you a different antibiotic than the one that caused your infection. Take your antibiotics as directed. Do not stop taking them just because you feel better. You need to take the full course of antibiotics. · To prevent dehydration, drink plenty of fluids, enough so that your urine is light yellow or clear like water. Choose water and other caffeine-free clear liquids until you feel better. If you have kidney, heart, or liver disease and have to limit fluids, talk with your doctor before you increase the amount of fluids you drink. · Begin eating small amounts of mild foods, if you feel like it. Try yogurt that has live cultures of lactobacillus (check the label). ¨ Avoid spicy foods, fruits, alcohol, and caffeine until 48 hours after all symptoms go away.   ¨ Avoid chewing gum that contains sorbitol. ¨ Avoid dairy products (except for yogurt with lactobacillus) while you have diarrhea and for 3 days after symptoms go away. · To prevent the spread of C. difficile, practice good hygiene. Keep your hands clean by washing them well and often with soap and clean, running water. Alcohol-based hand sanitizers do not kill C. difficile. When should you call for help? Call 911 if:  · You passed out (lost consciousness). Call your doctor now or seek immediate medical care if:  · You have a fever over 101°F or shaking chills. · You feel lightheaded or have a fast heart rate. · You pass stools that are almost always bloody. · You have signs of needing more fluids. You have sunken eyes and a dry mouth, and you pass only a little dark urine. · You have severe belly pain with or without bloating. · You have severe vomiting and cannot keep down liquids. · You are not passing any stools or gas. Watch closely for changes in your health, and be sure to contact your doctor if:  · You do not get better as expected. Where can you learn more? Go to http://isabel-kaleb.info/. Enter [phone] in the search box to learn more about \"Clostridium Difficile Colitis: Care Instructions. \"  Current as of: March 3, 2017  Content Version: 11.3  © 6946-7228 [employer]. Care instructions adapted under license by [employer] (which disclaims liability or warranty for this information). If you have questions about a medical condition or this instruction, always ask your healthcare professional. [address] any warranty or liability for your use of this information.

## 2017-09-06 NOTE — ED PROVIDER NOTES
HPI Comments: 2:01 PM    Severa Schlossman is a 73 y.o. female with hx of C. Diff (dx 7/14/17), cholecystectomy, appendectomy, and IBS presenting ambulatory to the ED c/o malodorous diarrhea x4 episodes, onset yesterday. Associated symptoms include resolved abdominal pain, abdominal bloating, and nausea. Pt states she was dx'ed with C. Diff on 7/14/17 s/p total hysterectomy on 6/26/17 and was rx'ed Flagyl. Pt was evaluated here a second time on 8/15/17 for C. Diff. Pt had a scheduled appointment with GI doctor today. Pt currently takes probiotics, but not every day. Pt has a jar of stool. Pt specifically denies current abx use (finished last week) and vomiting. PCP: Aranza Rodriguez MD    There are no other complaints, changes, or physical findings at this time. Patient is a 73 y.o. female presenting with other event. The history is provided by the patient. No  was used. Other   Associated symptoms include abdominal pain (resolved).         Past Medical History:   Diagnosis Date    Arthritis     Asthma     Cataract     Gall stones     GERD (gastroesophageal reflux disease)     Hay fever     Hiatal hernia with gastroesophageal reflux disease and esophagitis     Hyperlipidemia     Hypertension     Ill-defined condition     kidney stones    Kidney stones     bi-lateral    Nausea & vomiting     Psychiatric disorder     anxiety    Recurrent UTI     Sinusitis     Thyroid disease     thyroid nodules       Past Surgical History:   Procedure Laterality Date    HX APPENDECTOMY      HX CATARACT REMOVAL Bilateral     HX CHOLECYSTECTOMY      HX GYN  1980    uterine suspension    HX KNEE ARTHROSCOPY      HX LITHOTRIPSY      HX ORTHOPAEDIC Right     trigger finger release    HX OTHER SURGICAL      cystourethroscopy         Family History:   Problem Relation Age of Onset    Heart Disease Mother     Cancer Father     Hypertension Father        Social History     Social History  Marital status:      Spouse name: N/A    Number of children: N/A    Years of education: N/A     Occupational History    Not on file. Social History Main Topics    Smoking status: Never Smoker    Smokeless tobacco: Never Used    Alcohol use No    Drug use: No    Sexual activity: Yes     Partners: Male     Other Topics Concern    Not on file     Social History Narrative         ALLERGIES: Acetaminophen; Acetaminophen-codeine; Amoxicillin; Aspirin; Cephalexin; Ciprofloxacin; Clavulanic acid; Codeine; Doxycycline; Eryc [erythromycin]; Formaldehyde; Gold sodium thiomalate; Hydrocodone; Hydrocodone-acetaminophen; Meperidine; Neomycin; Nsaids (non-steroidal anti-inflammatory drug); Oxycodone; Penicillin g; Penicillin g potassium in d5w; Percocet [oxycodone-acetaminophen]; Potassium; Prolia [denosumab]; Resorcinol-calamine-zinc oxide; Sulfa (sulfonamide antibiotics); and Sulfanilamide    Review of Systems   Constitutional: Negative for chills, fever and unexpected weight change. HENT: Negative for trouble swallowing. Gastrointestinal: Positive for abdominal distention, abdominal pain (resolved) and diarrhea. Negative for nausea and vomiting. Genitourinary: Negative for decreased urine volume and dysuria. Musculoskeletal: Negative for back pain. Skin: Negative for rash. Neurological: Negative for dizziness and light-headedness. All other systems reviewed and are negative. Vitals:    09/06/17 1157 09/06/17 1557   BP: 155/84 156/88   Pulse: 96 84   Resp: 16 14   Temp: 97.8 °F (36.6 °C)    SpO2: 100%    Weight: 65.3 kg (144 lb)    Height: 5' 6\" (1.676 m)             Physical Exam   Constitutional: She is oriented to person, place, and time. She appears well-developed and well-nourished. No distress.  female in NAD. Alert. Very anxious. SO at bedside. HENT:   Head: Normocephalic and atraumatic. Mouth/Throat: Uvula is midline.  No oropharyngeal exudate, posterior oropharyngeal edema, posterior oropharyngeal erythema or tonsillar abscesses. Eyes: Conjunctivae are normal.   Neck: Normal range of motion. Cardiovascular: Normal rate, regular rhythm, normal heart sounds and intact distal pulses. Exam reveals no gallop and no friction rub. No murmur heard. Pulmonary/Chest: Effort normal and breath sounds normal. No respiratory distress. She has no wheezes. She has no rales. Abdominal: Normal appearance. She exhibits no ascites and no mass. There is no tenderness. There is no rigidity, no rebound, no guarding and no tenderness at McBurney's point. Musculoskeletal: Normal range of motion. Neurological: She is alert and oriented to person, place, and time. Skin: Skin is warm and dry. No rash noted. She is not diaphoretic. No erythema. Psychiatric: Her mood appears anxious. Nursing note and vitals reviewed. RESULTS:    PULSE OXIMETRY NOTE:  Pulse-ox is 100% on RA  Interpretation: normal       No orders to display        Labs Reviewed   CBC WITH AUTOMATED DIFF - Abnormal; Notable for the following:        Result Value    RBC 4.05 (*)     All other components within normal limits   METABOLIC PANEL, COMPREHENSIVE - Abnormal; Notable for the following:     Sodium 146 (*)     Potassium 3.3 (*)     Glucose 103 (*)     BUN/Creatinine ratio 10 (*)     All other components within normal limits   C. DIFFICILE/EPI PCR   CULTURE, STOOL   LIPASE       No results found for this or any previous visit (from the past 12 hour(s)). MDM  Number of Diagnoses or Management Options  Diarrhea, unspecified type:   History of Clostridium difficile:   Diagnosis management comments: Colitis, C diff, diverticulitis, food borne, gastroenteritis, PUD, GERD, pancreatitis, hepatitis, UTI/pyelo       Amount and/or Complexity of Data Reviewed  Clinical lab tests: ordered and reviewed (CBC, CMP, lipase, C.  Diff/EPI PCR)      ED Course     MEDICATIONS GIVEN:  Medications - No data to display    Procedures      PROGRESS NOTE:  2:01 PM  Initial assessment performed. Written by Keturah Valdez, ED Scribe, as dictated by Kentrell Cazares PA-C    3:28 PM  Pt is feeling better. Reassessment of abdomen is reassuring. We will start empirically on Flagyl for C. Diff and will await PCR testing. BRAT diet. PO hydration. Labs reassuring. No indication for repeat CT. FU with GI. Reasons to RTED discussed with pt. All questions answered. Pt feels comfortable going home at this time. Pt expressed understanding and she agrees with plan. Written by Keturah Valdez, ED Scribe, as dictated by Kentrell Cazares PA-C    DISCHARGE NOTE:  3:28 PM  Bib Munoz  results have been reviewed with her. She has been counseled regarding her diagnosis, treatment, and plan. She verbally conveys understanding and agreement of the signs, symptoms, diagnosis, treatment and prognosis and additionally agrees to follow up as discussed. She also agrees with the care-plan and conveys that all of her questions have been answered. I have also provided discharge instructions for her that include: educational information regarding their diagnosis and treatment, and list of reasons why they would want to return to the ED prior to their follow-up appointment, should her condition change. She has been provided with education for proper emergency department utilization. CLINICAL IMPRESSION:    1. Diarrhea, unspecified type    2. History of Clostridium difficile        PLAN:  1. D/C Home  2. Discharge Medication List as of 9/6/2017  3:30 PM      START taking these medications    Details   metroNIDAZOLE (FLAGYL) 500 mg tablet Take 1 Tab by mouth three (3) times daily for 14 days. Indications: Clostridium difficile infection, Print, Disp-42 Tab, R-0         CONTINUE these medications which have NOT CHANGED    Details   lidocaine (LIDOCAINE VISCOUS) 2 % solution Take 5 mL by mouth four (4) times daily as needed for Pain.  Mix with equal parts water.  Swish and swallow., Normal, Disp-1 Bottle, R-0      magnesium 250 mg tab Take  by mouth., Historical Med      POTASSIUM PO Take 10 mEq by mouth daily. , Historical Med      montelukast (SINGULAIR) 10 mg tablet Take 10 mg by mouth daily as needed., Historical Med      ERGOCALCIFEROL, VITAMIN D2, (VITAMIN D2 PO) Take 1 Tab by mouth daily. , Historical Med      amLODIPine (NORVASC) 2.5 mg tablet Take 2.5 mg by mouth nightly., Historical Med      aspirin delayed-release 81 mg tablet Take  by mouth daily. , Historical Med      butalbital-aspirin-caffeine (FIORINAL) capsule Take 1 Cap by mouth every four (4) hours as needed for Pain., Historical Med      multivitamin (ONE A DAY) tablet Take 1 Tab by mouth daily. , Historical Med      Cetirizine 10 mg cap Take 10 mg by mouth daily. , Historical Med      valACYclovir (VALTREX) 500 mg tablet Take 500 mg by mouth two (2) times a day., Historical Med      levocetirizine (XYZAL) 5 mg tablet Take 5 mg by mouth daily as needed for Allergies. , Historical Med      Dexlansoprazole (DEXILANT) 30 mg CpDM Take 30 mg by mouth daily. , Historical Med      dicyclomine (BENTYL) 20 mg tablet Take 20 mg by mouth two (2) times a day., Historical Med      eszopiclone (LUNESTA) 2 mg tablet Take 2 mg by mouth nightly., Historical Med      clonazePAM (KLONOPIN) 1 mg tablet Take 1 mg by mouth daily. Indications: PANIC DISORDER, Historical Med      simvastatin (ZOCOR) 40 mg tablet Take 40 mg by mouth nightly., Historical Med           3.    Follow-up Information     Follow up With Details Comments Contact Info    Duke Milner MD Schedule an appointment as soon as possible for a visit for GI follow up [address] [place] [address] [address] [facility] EMERGENCY DEPT  As needed, If symptoms worsen [address]  [phone]          SCRIBE ATTESTATION:  This note is prepared by Juan Lilly, acting as Scribe for Farhan Macias WIL    PROVIDER ATTESTATION:  Lali De La Torre PA-C: The scribe's documentation has been prepared under my direction and personally reviewed by me in its entirety. I confirm that the note above accurately reflects all work, treatment, procedures, and medical decision making performed by me.

## 2017-09-06 NOTE — ED NOTES
Patient resting supine in bed with continuous monitoring in place. Side rails up with call bell in reach. Patient advised of plan of care.  at bedside.

## 2018-03-20 ENCOUNTER — HOSPITAL ENCOUNTER (OUTPATIENT)
Age: 73
Setting detail: OUTPATIENT SURGERY
Discharge: HOME | End: 2018-03-20
Attending: UROLOGY | Admitting: UROLOGY
Payer: MEDICARE

## 2018-03-20 VITALS
HEART RATE: 62 BPM | OXYGEN SATURATION: 98 % | BODY MASS INDEX: 24.67 KG/M2 | WEIGHT: 153.5 LBS | HEIGHT: 66 IN | RESPIRATION RATE: 15 BRPM | DIASTOLIC BLOOD PRESSURE: 70 MMHG | SYSTOLIC BLOOD PRESSURE: 130 MMHG | TEMPERATURE: 97.7 F

## 2018-03-20 PROBLEM — N13.2 URETERAL STONE WITH HYDRONEPHROSIS: Status: ACTIVE | Noted: 2018-03-20

## 2018-03-20 PROCEDURE — 74011250636 HC RX REV CODE- 250/636

## 2018-03-20 PROCEDURE — 50590 FRAGMENTING OF KIDNEY STONE: CPT | Performed by: UROLOGY

## 2018-03-20 PROCEDURE — 76210000026 HC REC RM PH II 1 TO 1.5 HR: Performed by: UROLOGY

## 2018-03-20 PROCEDURE — 99153 MOD SED SAME PHYS/QHP EA: CPT

## 2018-03-20 PROCEDURE — 74011250636 HC RX REV CODE- 250/636: Performed by: UROLOGY

## 2018-03-20 PROCEDURE — 77030020782 HC GWN BAIR PAWS FLX 3M -B: Performed by: UROLOGY

## 2018-03-20 PROCEDURE — 99152 MOD SED SAME PHYS/QHP 5/>YRS: CPT

## 2018-03-20 RX ORDER — MIDAZOLAM HYDROCHLORIDE 5 MG/ML
6 INJECTION INTRAMUSCULAR; INTRAVENOUS AS NEEDED
Status: DISCONTINUED | OUTPATIENT
Start: 2018-03-20 | End: 2018-03-21 | Stop reason: HOSPADM

## 2018-03-20 RX ORDER — FLUMAZENIL 0.1 MG/ML
0.5 INJECTION INTRAVENOUS AS NEEDED
Status: DISCONTINUED | OUTPATIENT
Start: 2018-03-20 | End: 2018-03-21 | Stop reason: HOSPADM

## 2018-03-20 RX ORDER — SODIUM CHLORIDE, SODIUM LACTATE, POTASSIUM CHLORIDE, CALCIUM CHLORIDE 600; 310; 30; 20 MG/100ML; MG/100ML; MG/100ML; MG/100ML
125 INJECTION, SOLUTION INTRAVENOUS CONTINUOUS
Status: DISCONTINUED | OUTPATIENT
Start: 2018-03-20 | End: 2018-03-21 | Stop reason: HOSPADM

## 2018-03-20 RX ORDER — FENTANYL CITRATE 50 UG/ML
300 INJECTION, SOLUTION INTRAMUSCULAR; INTRAVENOUS AS NEEDED
Status: DISCONTINUED | OUTPATIENT
Start: 2018-03-20 | End: 2018-03-21 | Stop reason: HOSPADM

## 2018-03-20 RX ORDER — NALOXONE HYDROCHLORIDE 1 MG/ML
1 INJECTION INTRAMUSCULAR; INTRAVENOUS; SUBCUTANEOUS AS NEEDED
Status: DISCONTINUED | OUTPATIENT
Start: 2018-03-20 | End: 2018-03-21 | Stop reason: HOSPADM

## 2018-03-20 RX ORDER — SODIUM CHLORIDE, SODIUM LACTATE, POTASSIUM CHLORIDE, CALCIUM CHLORIDE 600; 310; 30; 20 MG/100ML; MG/100ML; MG/100ML; MG/100ML
75 INJECTION, SOLUTION INTRAVENOUS CONTINUOUS
Status: CANCELLED | OUTPATIENT
Start: 2018-03-20

## 2018-03-20 RX ORDER — ONDANSETRON 2 MG/ML
4 INJECTION INTRAMUSCULAR; INTRAVENOUS ONCE
Status: COMPLETED | OUTPATIENT
Start: 2018-03-20 | End: 2018-03-20

## 2018-03-20 RX ADMIN — FENTANYL CITRATE 50 MCG: 50 INJECTION, SOLUTION INTRAMUSCULAR; INTRAVENOUS at 16:58

## 2018-03-20 RX ADMIN — FENTANYL CITRATE 50 MCG: 50 INJECTION, SOLUTION INTRAMUSCULAR; INTRAVENOUS at 16:47

## 2018-03-20 RX ADMIN — FENTANYL CITRATE 100 MCG: 50 INJECTION, SOLUTION INTRAMUSCULAR; INTRAVENOUS at 16:29

## 2018-03-20 RX ADMIN — SODIUM CHLORIDE, SODIUM LACTATE, POTASSIUM CHLORIDE, AND CALCIUM CHLORIDE 125 ML/HR: 600; 310; 30; 20 INJECTION, SOLUTION INTRAVENOUS at 17:30

## 2018-03-20 RX ADMIN — FENTANYL CITRATE 50 MCG: 50 INJECTION, SOLUTION INTRAMUSCULAR; INTRAVENOUS at 16:41

## 2018-03-20 RX ADMIN — MIDAZOLAM HYDROCHLORIDE 2 MG: 5 INJECTION INTRAMUSCULAR; INTRAVENOUS at 16:29

## 2018-03-20 RX ADMIN — ONDANSETRON 4 MG: 2 INJECTION INTRAMUSCULAR; INTRAVENOUS at 16:29

## 2018-03-20 RX ADMIN — MIDAZOLAM HYDROCHLORIDE 1 MG: 5 INJECTION INTRAMUSCULAR; INTRAVENOUS at 16:58

## 2018-03-20 RX ADMIN — MIDAZOLAM HYDROCHLORIDE 1 MG: 5 INJECTION INTRAMUSCULAR; INTRAVENOUS at 16:47

## 2018-03-20 RX ADMIN — MIDAZOLAM HYDROCHLORIDE 1 MG: 5 INJECTION INTRAMUSCULAR; INTRAVENOUS at 16:41

## 2018-03-20 NOTE — PROCEDURES
Extracorporeal Shock Wave Lithotripsy Operative Report    Date of Surgery: 3/20/2018    Preoperative Diagnosis: HYDRO W/URETERAL STONE    Postoperative Diagnosis:  Right Distal Ureteral Stone    Surgeon: Melissa Faust MD    Assistants: Surgeon(s):  Melissa Faust MD    Anesthesia:  Con-Sed     Procedure: Procedure(s):  RIGHT URETERAL EXTRA CORPEAL SHOCKWAVE LITHOTRIPSY (IV SEDATION)    Procedure in Detail:    The risks, benefits, complications, treatment options, and expected outcomes were discussed with the patient. The patient concurred with the proposed plan, giving informed consent. Time out was held prior to procedure. The patient was placed in the supine position. The stone was aligned using fluoroscopic examination. The patient was then given 4000 at a maximum kV of 8.5. The patient tolerated the procedure well. Findings: There appeared to be mild changes in the stone.     Estimated Blood Loss:  none    Specimens: none       Implants: none            Condition: stable    Signed By: Melissa Faust MD     March 20, 2018

## 2018-03-20 NOTE — DISCHARGE INSTRUCTIONS
Laser Lithotripsy: What to Expect at [address] [facility] lithotripsy is a way to treat kidney stones. This treatment uses a laser to break kidney stones into tiny pieces. For several hours after the procedure you may have a burning feeling when you urinate. You may feel the urge to go even if you don't need to. This feeling should go away within a day. Drinking a lot of water can help. Your doctor also may advise you to take medicine that numbs the burning. This medicine is called phenazopyridine. It is available by prescription and over the counter. Brand names include Pyridium and Uristat. Your doctor may prescribe an antibiotic. This will help prevent an infection. You may have some blood in your urine for 2 or 3 days. Your doctor may have placed a small tube inside one of your ureters. Ureters are the tubes that connect the kidneys to the bladder. The small tube the doctor may have placed is called a stent. It may help the stone fragments pass through your body. Your doctor may remove the stent in a few weeks. Most stone fragments that are not removed pass out of the body within 24 hours. But sometimes it can take many weeks. If you have a large stone, you may need to come back for more treatments. This care sheet gives you a general idea about how long it will take for you to recover. But each person recovers at a different pace. Follow the steps below to feel better as quickly as possible. How can you care for yourself at home? Activity  ? · Rest as much as you need to after you go home. ? · You may do your regular activities. But avoid hard exercise or sports for about a week or until there is no blood in your urine. Diet  ? · You can eat your normal diet after lithotripsy. ? · Continue to drink plenty of fluids, enough so that your urine is light yellow or clear like water.  If you have kidney, heart, or liver disease and have to limit fluids, talk with your doctor before you increase the amount of fluids you drink. Medicines  ? · Your doctor will tell you if and when you can restart your medicines. He or she will also give you instructions about taking any new medicines. ? · If you take blood thinners, such as warfarin (Coumadin), clopidogrel (Plavix), or aspirin, be sure to talk to your doctor. He or she will tell you if and when to start taking those medicines again. Make sure that you understand exactly what your doctor wants you to do. ? · If you take medicine to stop the burning when you urinate, take it exactly as recommended. Call your doctor if you think you are having a problem with your medicine. This medicine may color your urine orange or red. This is normal. You will get more details on the specific medicine your doctor recommends. ? · If your doctor prescribed antibiotics, take them as directed. Do not stop taking them just because you feel better. You need to take the full course of antibiotics. ? · Be safe with medicines. Read and follow all instructions on the label. ¨ If the doctor gave you a prescription medicine for pain, take it as prescribed. ¨ If you are not taking a prescription pain medicine, ask your doctor if you can take acetaminophen (Tylenol). Do not take ibuprofen (Advil, Motrin) or naproxen (Aleve) or similar medicines unless your doctor tells you to. ¨ Do not take two or more pain medicines at the same time unless the doctor told you to. Many pain medicines have acetaminophen, which is Tylenol. Too much acetaminophen (Tylenol) can be harmful. ? Heat  ? · Take a warm bath. This may soothe the burning. Other instructions  ? · Urinate through the strainer the doctor gives you. Save any stone pieces, including those that look like sand or gravel. Take these to your doctor. This will help your doctor find the cause of your stones. Follow-up care is a key part of your treatment and safety.  Be sure to make and go to all appointments, and call your doctor if you are having problems. It's also a good idea to know your test results and keep a list of the medicines you take. When should you call for help? Call 911 anytime you think you may need emergency care. For example, call if:  ? · You passed out (lost consciousness). ? · You have chest pain, are short of breath, or cough up blood. ?Call your doctor now or seek immediate medical care if:  ? · You have pain that does not get better after you take pain medicine. ? · You have new or more blood clots in your urine. (It is normal for the urine to be pink for a few days.)   ? · You cannot urinate. ? · You have symptoms of a urinary tract infection. These may include:  ¨ Pain or burning when you urinate. ¨ A frequent need to urinate without being able to pass much urine. ¨ Pain in the flank, which is just below the rib cage and above the waist on either side of the back. ¨ Blood in the urine. ¨ A fever. ? · You are sick to your stomach or cannot drink fluids. ? · You have signs of a blood clot in your leg (called a deep vein thrombosis), such as:  ¨ Pain in the calf, back of the knee, thigh, or groin. ¨ Redness and swelling in your leg. ? Watch closely for any changes in your health, and be sure to contact your doctor if you have any problems. Where can you learn more? Go to http://isabel-kaleb.info/. Enter Q239 in the search box to learn more about \"Laser Lithotripsy: What to Expect at Home. \"  Current as of: May 12, 2017  Content Version: 11.4  © 5764-3016 [employer]. Care instructions adapted under license by [employer] (which disclaims liability or warranty for this information). If you have questions about a medical condition or this instruction, always ask your healthcare professional. [place] [address] any warranty or liability for your use of this information.       DISCHARGE SUMMARY from Nurse    PATIENT INSTRUCTIONS:    After general anesthesia or intravenous sedation, for 24 hours or while taking prescription Narcotics:  · Limit your activities  · Do not drive and operate hazardous machinery  · Do not make important personal or business decisions  · Do  not drink alcoholic beverages  · If you have not urinated within 8 hours after discharge, please contact your surgeon on call. Report the following to your surgeon:  · Excessive pain, swelling, redness or odor of or around the surgical area  · Temperature over 100.5  · Nausea and vomiting lasting longer than 4 hours or if unable to take medications  · Any signs of decreased circulation or nerve impairment to extremity: change in color, persistent  numbness, tingling, coldness or increase pain  · Any questions    What to do at Home:  Recommended activity: Activity as tolerated and Ambulate in house. If you experience any of the following symptoms fever greater than 101.0, chills, nausea or vomiting, inability to urinate, please follow up with Dr. Bekah George. Regular diet as tolerated. Increase fluid intake at home. *  Please give a list of your current medications to your Primary Care Provider. *  Please update this list whenever your medications are discontinued, doses are      changed, or new medications (including over-the-counter products) are added. *  Please carry medication information at all times in case of emergency situations. These are general instructions for a healthy lifestyle:    No smoking/ No tobacco products/ Avoid exposure to second hand smoke  Surgeon General's Warning:  Quitting smoking now greatly reduces serious risk to your health.     Obesity, smoking, and sedentary lifestyle greatly increases your risk for illness    A healthy diet, regular physical exercise & weight monitoring are important for maintaining a healthy lifestyle    You may be retaining fluid if you have a history of heart failure or if you experience any of the following symptoms:  Weight gain of 3 pounds or more overnight or 5 pounds in a week, increased swelling in our hands or feet or shortness of breath while lying flat in bed. Please call your doctor as soon as you notice any of these symptoms; do not wait until your next office visit. Recognize signs and symptoms of STROKE:    F-face looks uneven    A-arms unable to move or move unevenly    S-speech slurred or non-existent    T-time-call 911 as soon as signs and symptoms begin-DO NOT go       Back to bed or wait to see if you get better-TIME IS BRAIN. Warning Signs of HEART ATTACK     Call 911 if you have these symptoms:   Chest discomfort. Most heart attacks involve discomfort in the center of the chest that lasts more than a few minutes, or that goes away and comes back. It can feel like uncomfortable pressure, squeezing, fullness, or pain.  Discomfort in other areas of the upper body. Symptoms can include pain or discomfort in one or both arms, the back, neck, jaw, or stomach.  Shortness of breath with or without chest discomfort.  Other signs may include breaking out in a cold sweat, nausea, or lightheadedness. Don't wait more than five minutes to call 911 - MINUTES MATTER! Fast action can save your life. Calling 911 is almost always the fastest way to get lifesaving treatment. Emergency Medical Services staff can begin treatment when they arrive -- up to an hour sooner than if someone gets to the hospital by car. The discharge information has been reviewed with the patient and caregiver. The patient and caregiver verbalized understanding. Discharge medications reviewed with the patient and caregiver and appropriate educational materials and side effects teaching were provided. Patient armband removed and shredded.     ___________________________________________________________________________________________________________________________________

## 2018-03-20 NOTE — IP AVS SNAPSHOT
[facility] 
 
 
 [address] [phone] Patient: Hanna Huerta MRN: [id number] :1945 About your hospitalization You were admitted on:  2018 You last received care in the:  [address] [place] [address] You were discharged on:  2018 Why you were hospitalized Your primary diagnosis was:  Ureteral Stone With Hydronephrosis Follow-up Information Follow up With Details Comments Contact Info Clive Gonzalez MD   [address] [place], [place] [address] [address] [phone] Verónica Jaffe MD  Office will call you tomorrow to schedule follow up appointment. 111 [facility] 107 1700 [facility] 
[phone] Discharge Orders None A check amanda indicates which time of day the medication should be taken. My Medications CONTINUE taking these medications Instructions Each Dose to Equal  
 Morning Noon Evening Bedtime ALIGN 4 mg Cap Generic drug:  Bifidobacterium Infantis Your last dose was: Your next dose is: Take 1 Cap by mouth daily. 1 Cap  
    
   
   
   
  
 aspirin delayed-release 81 mg tablet Your last dose was: Your next dose is: Take  by mouth daily. Azelastine 0.15 % (205.5 mcg) nasal spray Commonly known as:  ASTEPRO Your last dose was: Your next dose is:    
   
   
 1 Spray by Both Nostrils route two (2) times a day. 1 Spray BENTYL 20 mg tablet Generic drug:  dicyclomine Your last dose was: Your next dose is: Take 20 mg by mouth three (3) times daily. 20 mg CALCIUM + D PO Your last dose was: Your next dose is: Take 1 Tab by mouth daily. 1 Tab CENTRUM SILVER ULTRA WOMEN'S PO Your last dose was: Your next dose is: Take 1 Tab by mouth daily. 1 Tab Cetirizine 10 mg Cap Your last dose was: Your next dose is: Take 10 mg by mouth nightly. 10 mg DEXILANT 30 mg capsule Generic drug:  dexlansoprazole Your last dose was: Your next dose is: Take 60 mg by mouth daily. 60 mg  
    
   
   
   
  
 dilTIAZem  mg Tb24 tablet Commonly known as:  CARDIZEM LA Your last dose was: Your next dose is: Take 240 mg by mouth daily. 240 mg  
    
   
   
   
  
 famotidine 20 mg tablet Commonly known as:  PEPCID Your last dose was: Your next dose is: Take 20 mg by mouth daily. 20 mg  
    
   
   
   
  
 FIORINAL capsule Generic drug:  butalbital-aspirin-caffeine Your last dose was: Your next dose is: Take 1 Cap by mouth every four (4) hours as needed for Pain. 1 Cap FLOMAX 0.4 mg capsule Generic drug:  tamsulosin Your last dose was: Your next dose is: Take 0.4 mg by mouth daily. 0.4 mg  
    
   
   
   
  
 FLONASE ALLERGY RELIEF 50 mcg/actuation nasal spray Generic drug:  fluticasone Your last dose was: Your next dose is: 2 Sprays by Both Nostrils route daily as needed for Rhinitis. 2 Spray KlonoPIN 1 mg tablet Generic drug:  clonazePAM  
   
Your last dose was: Your next dose is: Take 1 mg by mouth daily. Indications: PANIC DISORDER  
 1 mg LUNESTA 2 mg tablet Generic drug:  eszopiclone Your last dose was: Your next dose is: Take 2 mg by mouth nightly. 2 mg MULTAQ Tab tablet Generic drug:  dronedarone Your last dose was: Your next dose is: Take 400 mg by mouth two (2) times daily (with meals). 400 mg  
    
   
   
   
  
 potassium chloride SR 10 mEq tablet Commonly known as:  KLOR-CON 10 Your last dose was: Your next dose is: Take 20 mEq by mouth daily. 20 mEq  
    
   
   
   
  
 traMADol 50 mg tablet Commonly known as:  ULTRAM  
   
Your last dose was: Your next dose is: Take 50 mg by mouth two (2) times daily as needed for Pain. 50 mg  
    
   
   
   
  
 triamcinolone acetonide 0.1 % topical cream  
Commonly known as:  KENALOG Your last dose was: Your next dose is:    
   
   
 Apply  to affected area two (2) times a day. use thin layer  
     
   
   
   
  
 valACYclovir 500 mg tablet Commonly known as:  VALTREX Your last dose was: Your next dose is: Take 500 mg by mouth two (2) times daily as needed. 500 mg  
    
   
   
   
  
 VITAMIN D3 1,000 unit tablet Generic drug:  cholecalciferol Your last dose was: Your next dose is: Take 2,000 Units by mouth daily. 2000 Units XARELTO 20 mg Tab tablet Generic drug:  rivaroxaban Your last dose was: Your next dose is: Take 20 mg by mouth daily (with dinner). 20 mg  
    
   
   
   
  
 ZOCOR 40 mg tablet Generic drug:  simvastatin Your last dose was: Your next dose is: Take 20 mg by mouth nightly. 20 mg  
    
   
   
   
  
 ZOFRAN ODT 4 mg disintegrating tablet Generic drug:  ondansetron Your last dose was: Your next dose is: Take 8 mg by mouth every eight (8) hours as needed for Nausea. 8 mg Discharge Instructions Laser Lithotripsy: What to Expect at [facility] Your Recovery Laser lithotripsy is a way to treat kidney stones. This treatment uses a laser to break kidney stones into tiny pieces. For several hours after the procedure you may have a burning feeling when you urinate. You may feel the urge to go even if you don't need to. This feeling should go away within a day. Drinking a lot of water can help. Your doctor also may advise you to take medicine that numbs the burning. This medicine is called phenazopyridine. It is available by prescription and over the counter. Brand names include Pyridium and Uristat. Your doctor may prescribe an antibiotic. This will help prevent an infection. You may have some blood in your urine for 2 or 3 days. Your doctor may have placed a small tube inside one of your ureters. Ureters are the tubes that connect the kidneys to the bladder. The small tube the doctor may have placed is called a stent. It may help the stone fragments pass through your body. Your doctor may remove the stent in a few weeks. Most stone fragments that are not removed pass out of the body within 24 hours. But sometimes it can take many weeks. If you have a large stone, you may need to come back for more treatments. This care sheet gives you a general idea about how long it will take for you to recover. But each person recovers at a different pace. Follow the steps below to feel better as quickly as possible. How can you care for yourself at home? Activity ? · Rest as much as you need to after you go home. ? · You may do your regular activities. But avoid hard exercise or sports for about a week or until there is no blood in your urine. Diet ? · You can eat your normal diet after lithotripsy. ? · Continue to drink plenty of fluids, enough so that your urine is light yellow or clear like water. If you have kidney, heart, or liver disease and have to limit fluids, talk with your doctor before you increase the amount of fluids you drink. Medicines ? · Your doctor will tell you if and when you can restart your medicines. He or she will also give you instructions about taking any new medicines. ? · If you take blood thinners, such as warfarin (Coumadin), clopidogrel (Plavix), or aspirin, be sure to talk to your doctor. He or she will tell you if and when to start taking those medicines again. Make sure that you understand exactly what your doctor wants you to do. ? · If you take medicine to stop the burning when you urinate, take it exactly as recommended. Call your doctor if you think you are having a problem with your medicine. This medicine may color your urine orange or red. This is normal. You will get more details on the specific medicine your doctor recommends. ? · If your doctor prescribed antibiotics, take them as directed. Do not stop taking them just because you feel better. You need to take the full course of antibiotics. ? · Be safe with medicines. Read and follow all instructions on the label. ¨ If the doctor gave you a prescription medicine for pain, take it as prescribed. ¨ If you are not taking a prescription pain medicine, ask your doctor if you can take acetaminophen (Tylenol). Do not take ibuprofen (Advil, Motrin) or naproxen (Aleve) or similar medicines unless your doctor tells you to. ¨ Do not take two or more pain medicines at the same time unless the doctor told you to. Many pain medicines have acetaminophen, which is Tylenol. Too much acetaminophen (Tylenol) can be harmful. ? Heat ? · Take a warm bath. This may soothe the burning. Other instructions ? · Urinate through the strainer the doctor gives you. Save any stone pieces, including those that look like sand or gravel. Take these to your doctor. This will help your doctor find the cause of your stones. Follow-up care is a key part of your treatment and safety. Be sure to make and go to all appointments, and call your doctor if you are having problems.  It's also a good idea to know your test results and keep a list of the medicines you take. When should you call for help? Call 911 anytime you think you may need emergency care. For example, call if: 
? · You passed out (lost consciousness). ? · You have chest pain, are short of breath, or cough up blood. ?Call your doctor now or seek immediate medical care if: 
? · You have pain that does not get better after you take pain medicine. ? · You have new or more blood clots in your urine. (It is normal for the urine to be pink for a few days.) ? · You cannot urinate. ? · You have symptoms of a urinary tract infection. These may include: 
¨ Pain or burning when you urinate. ¨ A frequent need to urinate without being able to pass much urine. ¨ Pain in the flank, which is just below the rib cage and above the waist on either side of the back. ¨ Blood in the urine. ¨ A fever. ? · You are sick to your stomach or cannot drink fluids. ? · You have signs of a blood clot in your leg (called a deep vein thrombosis), such as: 
¨ Pain in the calf, back of the knee, thigh, or groin. ¨ Redness and swelling in your leg. ? Watch closely for any changes in your health, and be sure to contact your doctor if you have any problems. Where can you learn more? Go to http://isabel-kaleb.info/. Enter Q239 in the search box to learn more about \"Laser Lithotripsy: What to Expect at Home. \" Current as of: May 12, 2017 Content Version: 11.4 © 4989-5819 [employer]. Care instructions adapted under license by [employer] (which disclaims liability or warranty for this information). If you have questions about a medical condition or this instruction, always ask your healthcare professional. [place] [address] any warranty or liability for your use of this information. DISCHARGE SUMMARY from Nurse PATIENT INSTRUCTIONS: 
 
 
F-face looks uneven A-arms unable to move or move unevenly S-speech slurred or non-existent T-time-call 911 as soon as signs and symptoms begin-DO NOT go Back to bed or wait to see if you get better-TIME IS BRAIN. Warning Signs of HEART ATTACK Call 911 if you have these symptoms: 
? Chest discomfort. Most heart attacks involve discomfort in the center of the chest that lasts more than a few minutes, or that goes away and comes back. It can feel like uncomfortable pressure, squeezing, fullness, or pain. ? Discomfort in other areas of the upper body. Symptoms can include pain or discomfort in one or both arms, the back, neck, jaw, or stomach. ? Shortness of breath with or without chest discomfort. ? Other signs may include breaking out in a cold sweat, nausea, or lightheadedness. Don't wait more than five minutes to call [address]! Fast action can save your life. Calling 911 is almost always the fastest way to get lifesaving treatment. Emergency Medical Services staff can begin treatment when they arrive  up to an hour sooner than if someone gets to the hospital by car. The discharge information has been reviewed with the patient and caregiver. The patient and caregiver verbalized understanding. Discharge medications reviewed with the patient and caregiver and appropriate educational materials and side effects teaching were provided. Patient armband removed and shredded. ___________________________________________________________________________________________________________________________________ Introducing [facility]! Felipe Mae introduces [employer] patient portal. Now you can access parts of your medical record, email your doctor's office, and request medication refills online. 1. In your internet browser, go to [email or web address]. [employer] 2. Click on the First Time User? Click Here link in the Sign In box. You will see the New Member Sign Up page. 3. Enter your Flypay Access Code exactly as it appears below. You will not need to use this code after youve completed the sign-up process. If you do not sign up before the expiration date, you must request a new code. · Fly[employer] Access Code: [email or web address] Expires: 6/14/2018 12:03 PM 
 
4. Enter the last four digits of your Social Security Number (xxxx) and Date of Birth (mm/dd/yyyy) as indicated and click Submit. You will be taken to the next sign-up page. 5. Create a Flypay ID. This will be your Flypay login ID and cannot be changed, so think of one that is secure and easy to remember. 6. Create a Flypay password. You can change your password at any time. 7. Enter your Password Reset Question and Answer. This can be used at a later time if you forget your password. 8. Enter your e-mail address. You will receive e-mail notification when new information is available in [address] 9. Click Sign Up. You can now view and download portions of your medical record. 10. Click the Download Summary menu link to download a portable copy of your medical information. If you have questions, please visit the Frequently Asked Questions section of the Flypay website. Remember, Flypay is NOT to be used for urgent needs. For medical emergencies, dial 911. Now available from your iPhone and Android! Providers Seen During Your Hospitalization Provider Specialty Primary office phone Isis Neil MD Urology [phone] Your Primary Care Physician (PCP) Primary Care Physician Office Phone Office Fax 150 [facility], [address] [place] [address] [facility] [phone] You are allergic to the following Allergen Reactions Acetaminophen Unable to Obtain Acetaminophen-Codeine Other (comments) Amoxicillin Hives Aspirin Hives Augmentin (Amoxicillin-Pot Clavulanate) Other (comments) Cephalexin Hives Nausea and Vomiting Ciprofloxacin Other (comments) GI distress Clavulanic Acid Unknown (comments) Codeine Hives Doxycycline Hives Eryc (Erythromycin) Other (comments) Gi ditress Formaldehyde Unknown (comments) Gold Sodium Thiomalate Unknown (comments) Hydrocodone Unknown (comments) Hydrocodone-Acetaminophen Rash Other reaction(s): mild rash/itching Meperidine Hives Neomycin Other (comments) Nsaids (Non-Steroidal Anti-Inflammatory Drug) Unknown (comments) Oxycodone Unknown (comments) Penicillin G Hives Penicillin G Potassium In D5w Unknown (comments) Percocet (Oxycodone-Acetaminophen) Other (comments) Other reaction(s): gi distress GI Potassium Unknown (comments) Prolia (Denosumab) Hives Unknown (comments) Resorcinol-Calamine-Zinc Oxide Other (comments) Sulfa (Sulfonamide Antibiotics) Hives Sulfanilamide Unknown (comments) Tetracycline Hives Rash Recent Documentation Height Weight BMI OB Status Smoking Status 1.676 m 69.6 kg 24.78 kg/m2 Postmenopausal Never Smoker Emergency Contacts Name Discharge Info Relation Home Work Mobile Vicente Samuel DISCHARGE CAREGIVER [3] Spouse [3] [phone] Jose Samuel  Spouse [3] [phone] Madelinnay Barry  Spouse [3] [phone] Patient Belongings  The following personal items are in your possession at time of discharge: 
  Dental Appliances: None         Home Medications: None   Jewelry: None Clothing: Socks, Shirt, Pants, Undergarments, Footwear, Jacket/Coat    Other Valuables: Cell Phone (with drew) Please provide this summary of care documentation to your next provider. Signatures-by signing, you are acknowledging that this After Visit Summary has been reviewed with you and you have received a copy. Patient Signature:  ____________________________________________________________ Date:  ____________________________________________________________  
  
Elizabeth Deiters Provider Signature:  ____________________________________________________________ Date:  ____________________________________________________________

## 2018-03-20 NOTE — PERIOP NOTES
Patient had 20g IV to right hand that was not documented by previous RN. IV infusing without issue; no signs of infiltrate or infection, IV removed with catheter intact prior to discharge.

## 2018-03-20 NOTE — PERIOP NOTES
EKG strip taped to EKG monitoring sheet  Tolerated treatment fair  Treatment site--Right buttock--Clear

## 2018-03-20 NOTE — PERIOP NOTES
AVS medication list reviewed, no duplicates noted. Discharge information reviewed with patient and spouse; all questions answered.

## 2018-03-20 NOTE — H&P
A    [facility] PCP SPECIALIST  [address], [address] [facility]  phone: [phone]  fax: [phone]          Patient: Janora Carrel  YOB: 1945        Assessment/Plan  # Detail Type Description    1. Assessment Hydronephrosis with ureteral calculous obstruction (N13.2). Patient Plan She had her right distal ureteral stone. It is approx 640 hounsfield units on CT scan. It is 2s6q4pm. We discussed risk of ESWL vs ureteroscopy. She wishes to proceed with ESWL. Risk fo bleeding, infection, possible need fro more than 1 procedure to remain stone free were discussed. She will proceed. 2. Assessment Kidney stone (N20.0). Patient Plan She has bilateral lower pole stones. We discussed that these are unlikely the source of problem as about only 20% will actually pass or cause pain in the future. This 81year old female presents for Kidney and/or Ureteral Stone and UTI. History of Present Illness:  1. Kidney and/or Ureteral Stone      Onset was 27 years ago. Severity level is moderate. It occurs intermittently. The problem is worse. Location of pain is right flank. The pain radiates to the groin. The patient lists no relieving factors. Associated symptoms include nausea and pain. Pertinent negatives include chills, constipation, diarrhea, fever and vomiting. Additional information: She has had 12 stones in the past -- she has had several ESWL and ureteoscopies. US (10/31/17): 1cm LLP stone and a 5mm RLP stone. 3/16/18 - for the past 5 weeks she has had intermittent right flank pain and nausea. CT (3/14/18) - 4x6mm RLP stone and 8mm LLP stone and a 4x6mm right distal ureteral stone (645 HU). .      2.  UTI      The onset was 1 year ago. The severity of the problem is moderate. The problem has worsened. The symptoms are recurring. Presenting/Initial symptoms include dysuria.  Pertinent history includes being over 50 and being postmenopausal. Pertinent history does not include diabetes or alcohol consumption. Denies aggravating factors. She denies relieving factors. Pertinent negatives include constipation. Additional information: She had 2 UTI's in 5/2017 -- She also had terrible c diff too. PAST MEDICAL/SURGICAL HISTORY   (Reviewed, updated)    Disease/disorder Onset Date Management Date Comments     D&C 2017      Hysterectomy, total 2017      trigger finger surgery 2005      Cholecystectomy 1996      Appendectomy 1980      Arthroscopy knee       cataract extraction and lens implantation       uterine suspension     Allergies       GERD       Hx Renal Calculi       Hyperlipidemia       KUB 9/12/13 ([facility])       Osteoarthritis       Post Menopausal       Urolithiasis         DIAGNOSTICS HISTORY:  Test Ordered Interpretation Result completed   COLONOSCOPY AND BIOPSY  abnormal See scanned report. 06/13/2013   * Dexa, Bone Density Scan  abnormal See scanned report. 10/22/2013   * X-RAY EXAM OF ABDOMEN KUB ADULT  abnormal See scanned report. 03/25/2015   COLONOSCOPY AND BIOPSY   See scanned report. 06/13/2013   ULTRASOUND OF ABDOMEN  normal See scanned report. 12/15/2015   ESOPHAGUS ENDOSCOPY, Biopsy Single Or Multiple  normal  01/04/2016   * Screening mammography digital  normal  03/19/2015   *Echocardiography Trans Complete  see detail EF 0.60 (60%). 09/16/2016   Echo/MUGA - Ejection Fraction 09/16/2016  EF 0.60 (60%) 09/18/2016   * Screening mammography digital   See scanned report.  01/17/2017     Test Ordered Ordering Comments Modifier   COLONOSCOPY AND BIOPSY      * Dexa, Bone Density Scan      * X-RAY EXAM OF ABDOMEN KUB ADULT  calculi LT    COLONOSCOPY AND BIOPSY  per Dr Jannette Zheng 5 year recall    ULTRASOUND OF ABDOMEN      ESOPHAGUS ENDOSCOPY, Biopsy Single Or Multiple      * Screening mammography digital      *Echocardiography Trans Complete      Echo/MUGA - Ejection Fraction 09/16/2016     * Screening mammography digital GYNECOLOGIC HISTORY:  Patient is postmenopausal.       PROBLEM LIST:  Problem Description Onset Date   Hyperlipidemia 2011   Gastroesophageal reflux disease 2011   Anxiety disorder 2011   Disorder of bone and articular cartilage 2011   Insomnia 2011   Osteoporosis 2014   Arthropathy 2014   Neoplasm of uncertain behavior of skin 2012   No active problems    Allergies  Ingredient Reaction Medication Name Comment   DENOSUMAB  Prolia    FORMALDEHYDE      GOLD SODIUM THIOMALATE      DOXYCYCLINE hives     OXYCODONE HCL  PERCOCET    ACETAMINOPHEN  PERCOCET    TETRACYCLINE Hives/Skin Rash     SULFANILAMIDE      AMOXICILLIN TRIHYDRATE   AUGMENTIN   CIPROFLOXACIN      POTASSIUM CLAVULANATE   AUGMENTIN   PENICILLIN V      CEPHALEXIN MONOHYDRATE   Keflex   ACETAMINOPHEN   Tylenol-Codeine   ASPIRIN      HYDROCODONE BITARTRATE   Vicodin   ERYTHROMYCIN BASE      MEPERIDINE HCL   Demerol   ACETAMINOPHEN   Vicodin   CODEINE PHOSPHATE   Tylenol-Codeine     Family History:  (Reviewed, updated)  Relationship Family Member Name  Age at Death Condition Onset Age Cause of Death       Family history of Thyroid disorder  N   Father  Y    N   Father    Hypertension  N   Father  Y  Cancer, lung  Y   Mother    Heart disease  N   Mother    Alzheimer's Disease  N   Mother    Thyroid disorder  N   Mother    Congestive heart failure  N   Sister    Osteoarthritis  N   Sister    Cancer, lung  N   Sister    Crohn's disease  N   Sister    Hyperthyroidism  N   Sister    Colon polyps  N   Sister    Cancer  N   Sister    Thyroid disorder  N   Sister    Depression  N         Social History:  (Reviewed, updated)  The patient is right-handed. Preferred language is [place]. MARITAL STATUS/FAMILY/SOCIAL SUPPORT  Currently . Tobacco use status: Never smoked tobacco.  Smoking status: Never smoker. No passive smoke exposure. ALCOHOL  There is no history of alcohol use. CAFFEINE  The patient uses caffeine: tea and soda. - 3 cups a day. LIFESTYLE  light activity level. Never exercises. DIET  healthy. Religion/SPIRITUAL  Patient agrees to transfusion. Advance Directives:  STATUS  Last reviewed on:     DIRECTIVES  Transfusion: Patient agrees to transfusion. Review of Systems  System Neg/Pos Details   Constitutional Positive Pain. Constitutional Negative Chills and fever. ENMT Negative Ear infections and sore throat. Eyes Negative Blurred vision, double vision and eye pain. Respiratory Negative Asthma, chronic cough, dyspnea and wheezing. Cardio Negative Chest pain. GI Negative Constipation, decreased appetite, diarrhea, nausea and vomiting. Endocrine Negative Cold intolerance, heat intolerance, increased thirst and weight loss. Neuro Negative Headache and tremors. Psych Negative Anxiety and depression. Integumentary Negative Itching skin and rash. MS Negative Back pain and joint pain. Hema/Lymph Negative Easy bleeding. Physical Exam  Exam Findings Details   Constitutional Normal Well developed. Neck Exam Normal Inspection - Normal.   Respiratory Normal Inspection - Normal.   Abdomen Normal No abdominal tenderness. Genitourinary Normal No Suprapubic tenderness. No CVA tenderness. Extremity Normal No Edema. Psychiatric Normal Oriented to time, place, person and situation. Appropriate mood and affect.          Medications (added, continued, or stopped today)  Started Medication Directions Instruction Stopped   09/01/2017 Align 4 mg capsule apply 1 by Oral route nce a day     09/11/2016 Aspir-81 81 mg tablet,delayed release take 1 tablet by oral route  every day     05/24/2017 azelastine 0.15 % (205.5 mcg) nasal spray spray 1 spray by intranasal route 2 times every day in each nostril     05/24/2017 Bentyl 20 mg tablet take 1 tablet by oral route 3 times every day     03/12/2018 butalbital-acetaminophen-caffeine 50 mg-300 mg-40 mg capsule take 1  capsule by oral route  every 4 hours as needed not to exceed 6 capsules per 24hrs      CALCIUM + VIT D otc     09/11/2016 Centrum Silver Women 8 mg iron-400 mcg-300 mcg tablet take one tablet by oral route daily     02/06/2018 clonazepam 1 mg tablet take 1 tablet by oral route  every day     06/19/2017 DEXILANT 60 MG CAPSULE, DELAYED RELEASE TAKE 1 CAPSULE BY ORAL ROUTE  EVERY DAY  03/16/2018 03/16/2018 DEXILANT DR CAPS 60MG TAKE 1 CAPSULE DAILY     03/12/2018 diltiazem  mg capsule,extended release 24 hr take 1 capsule by oral route  every day     11/02/2017 famotidine 20 mg tablet take 1 tablet by oral route  every day     02/14/2018 Flomax 0.4 mg capsule take 1 capsule by oral route  every day 1/2 hour following the same meal each day     10/19/2016 Flonase 50 mcg/actuation nasal spray,suspension spray1- 2 Spray(s) by Nasal route 1 time every day as needed     02/07/2018 Lunesta 2 mg tablet take 1 tablet (2MG)  by oral route  every day at bedtime     03/12/2018 Multaq 400 mg tablet take 1 tablet by oral route 2 times every day with morning and evening meals     10/18/2017 potassium chloride ER 10 mEq capsule,extended release take 2 capsule by oral route  every day with food     09/06/2017 Probiotic 10 billion cell capsule take 1 tablet by oral route  every day     11/02/2017 simvastatin 20 mg tablet take 1 tablet by oral route  every day in the evening     03/16/2018 tramadol 50 mg tablet take 1 tablet by oral route  every 6 hours as needed     03/12/2018 tramadol 50 mg tablet take 1 tablet by oral route 2 times every day as needed     01/06/2018 triamcinolone acetonide 0.1 % topical cream apply by topical route 2 times every day a thin layer to the affected area(s)     03/12/2018 valacyclovir 500 mg tablet take 2 tablets by oral route daily for 5 days as needed  02/13/2020 09/11/2016 Vitamin D3 2,000 unit capsule take one capsule by oral route daily     03/12/2018 Xarelto 20 mg tablet take 1 tablet by oral route  every day with the evening meal     03/16/2018 Zofran ODT 4 mg disintegrating tablet place 2 tablet by translingual route  every 8 hours for 2 days on top of the tongue where they will dissolve, then swallow  03/17/2018 11/02/2017 Zyrtec 10 mg tablet take 1 tablet by ORAL route  every bedtime     Completed by:              Valentino Cipro, MD

## 2018-03-23 ENCOUNTER — ANESTHESIA EVENT (OUTPATIENT)
Dept: SURGERY | Age: 73
End: 2018-03-23
Payer: MEDICARE

## 2018-03-23 ENCOUNTER — ANESTHESIA (OUTPATIENT)
Dept: SURGERY | Age: 73
End: 2018-03-23
Payer: MEDICARE

## 2018-03-23 ENCOUNTER — HOSPITAL ENCOUNTER (OUTPATIENT)
Age: 73
Setting detail: OUTPATIENT SURGERY
Discharge: HOME | End: 2018-03-23
Attending: UROLOGY | Admitting: UROLOGY
Payer: MEDICARE

## 2018-03-23 ENCOUNTER — HOSPITAL ENCOUNTER (OUTPATIENT)
Dept: PREADMISSION TESTING | Age: 73
Discharge: HOME | End: 2018-03-23
Payer: MEDICARE

## 2018-03-23 ENCOUNTER — APPOINTMENT (OUTPATIENT)
Dept: GENERAL RADIOLOGY | Age: 73
End: 2018-03-23
Attending: UROLOGY
Payer: MEDICARE

## 2018-03-23 VITALS
HEIGHT: 66 IN | WEIGHT: 156.44 LBS | OXYGEN SATURATION: 98 % | RESPIRATION RATE: 16 BRPM | SYSTOLIC BLOOD PRESSURE: 142 MMHG | TEMPERATURE: 98.2 F | BODY MASS INDEX: 25.14 KG/M2 | DIASTOLIC BLOOD PRESSURE: 76 MMHG | HEART RATE: 71 BPM

## 2018-03-23 DIAGNOSIS — N13.2 URETERAL STONE WITH HYDRONEPHROSIS: Primary | ICD-10-CM

## 2018-03-23 LAB
HCT VFR BLD AUTO: 36.3 % (ref 35–45)
HGB BLD-MCNC: 12.1 G/DL (ref 12–16)

## 2018-03-23 PROCEDURE — 74011250636 HC RX REV CODE- 250/636

## 2018-03-23 PROCEDURE — 36415 COLL VENOUS BLD VENIPUNCTURE: CPT | Performed by: UROLOGY

## 2018-03-23 PROCEDURE — 77030018846 HC SOL IRR STRL H20 ICUM -A: Performed by: UROLOGY

## 2018-03-23 PROCEDURE — 76060000032 HC ANESTHESIA 0.5 TO 1 HR: Performed by: UROLOGY

## 2018-03-23 PROCEDURE — C1758 CATHETER, URETERAL: HCPCS | Performed by: UROLOGY

## 2018-03-23 PROCEDURE — 74420 UROGRAPHY RTRGR +-KUB: CPT

## 2018-03-23 PROCEDURE — 77030006974 HC BSKT URET RTVR BSC -C: Performed by: UROLOGY

## 2018-03-23 PROCEDURE — C1769 GUIDE WIRE: HCPCS | Performed by: UROLOGY

## 2018-03-23 PROCEDURE — 76210000006 HC OR PH I REC 0.5 TO 1 HR: Performed by: UROLOGY

## 2018-03-23 PROCEDURE — 76210000026 HC REC RM PH II 1 TO 1.5 HR: Performed by: UROLOGY

## 2018-03-23 PROCEDURE — 85018 HEMOGLOBIN: CPT | Performed by: UROLOGY

## 2018-03-23 PROCEDURE — 82360 CALCULUS ASSAY QUANT: CPT | Performed by: UROLOGY

## 2018-03-23 PROCEDURE — 77030020782 HC GWN BAIR PAWS FLX 3M -B: Performed by: UROLOGY

## 2018-03-23 PROCEDURE — 77030018836 HC SOL IRR NACL ICUM -A: Performed by: UROLOGY

## 2018-03-23 PROCEDURE — 74011250636 HC RX REV CODE- 250/636: Performed by: UROLOGY

## 2018-03-23 PROCEDURE — C2617 STENT, NON-COR, TEM W/O DEL: HCPCS | Performed by: UROLOGY

## 2018-03-23 PROCEDURE — 74011000250 HC RX REV CODE- 250

## 2018-03-23 PROCEDURE — 74011636320 HC RX REV CODE- 636/320: Performed by: UROLOGY

## 2018-03-23 PROCEDURE — 76010000138 HC OR TIME 0.5 TO 1 HR: Performed by: UROLOGY

## 2018-03-23 DEVICE — STENT URET DBL PGTL MULT SFT GWIRE .038IN GLDEWIRE STIFF: Type: IMPLANTABLE DEVICE | Site: URETER | Status: FUNCTIONAL

## 2018-03-23 RX ORDER — LIDOCAINE HYDROCHLORIDE 20 MG/ML
INJECTION, SOLUTION EPIDURAL; INFILTRATION; INTRACAUDAL; PERINEURAL AS NEEDED
Status: DISCONTINUED | OUTPATIENT
Start: 2018-03-23 | End: 2018-03-23 | Stop reason: HOSPADM

## 2018-03-23 RX ORDER — FLUMAZENIL 0.1 MG/ML
0.2 INJECTION INTRAVENOUS
Status: DISCONTINUED | OUTPATIENT
Start: 2018-03-23 | End: 2018-03-23 | Stop reason: HOSPADM

## 2018-03-23 RX ORDER — PROPOFOL 10 MG/ML
INJECTION, EMULSION INTRAVENOUS AS NEEDED
Status: DISCONTINUED | OUTPATIENT
Start: 2018-03-23 | End: 2018-03-23 | Stop reason: HOSPADM

## 2018-03-23 RX ORDER — ONDANSETRON 2 MG/ML
INJECTION INTRAMUSCULAR; INTRAVENOUS AS NEEDED
Status: DISCONTINUED | OUTPATIENT
Start: 2018-03-23 | End: 2018-03-23 | Stop reason: HOSPADM

## 2018-03-23 RX ORDER — FENTANYL CITRATE 50 UG/ML
INJECTION, SOLUTION INTRAMUSCULAR; INTRAVENOUS AS NEEDED
Status: DISCONTINUED | OUTPATIENT
Start: 2018-03-23 | End: 2018-03-23 | Stop reason: HOSPADM

## 2018-03-23 RX ORDER — FENTANYL CITRATE 50 UG/ML
50 INJECTION, SOLUTION INTRAMUSCULAR; INTRAVENOUS
Status: DISCONTINUED | OUTPATIENT
Start: 2018-03-23 | End: 2018-03-23 | Stop reason: HOSPADM

## 2018-03-23 RX ORDER — SODIUM CHLORIDE 0.9 % (FLUSH) 0.9 %
5-10 SYRINGE (ML) INJECTION AS NEEDED
Status: DISCONTINUED | OUTPATIENT
Start: 2018-03-23 | End: 2018-03-23 | Stop reason: HOSPADM

## 2018-03-23 RX ORDER — TRAMADOL HYDROCHLORIDE 50 MG/1
50 TABLET ORAL
Qty: 20 TAB | Refills: 0 | Status: SHIPPED | OUTPATIENT
Start: 2018-03-23 | End: 2021-05-10 | Stop reason: CLARIF

## 2018-03-23 RX ORDER — DEXAMETHASONE SODIUM PHOSPHATE 4 MG/ML
INJECTION, SOLUTION INTRA-ARTICULAR; INTRALESIONAL; INTRAMUSCULAR; INTRAVENOUS; SOFT TISSUE AS NEEDED
Status: DISCONTINUED | OUTPATIENT
Start: 2018-03-23 | End: 2018-03-23 | Stop reason: HOSPADM

## 2018-03-23 RX ORDER — LEVOFLOXACIN 5 MG/ML
500 INJECTION, SOLUTION INTRAVENOUS ONCE
Status: COMPLETED | OUTPATIENT
Start: 2018-03-23 | End: 2018-03-23

## 2018-03-23 RX ORDER — INSULIN LISPRO 100 [IU]/ML
INJECTION, SOLUTION INTRAVENOUS; SUBCUTANEOUS ONCE
Status: DISCONTINUED | OUTPATIENT
Start: 2018-03-23 | End: 2018-03-23 | Stop reason: HOSPADM

## 2018-03-23 RX ORDER — SODIUM CHLORIDE, SODIUM LACTATE, POTASSIUM CHLORIDE, CALCIUM CHLORIDE 600; 310; 30; 20 MG/100ML; MG/100ML; MG/100ML; MG/100ML
1000 INJECTION, SOLUTION INTRAVENOUS CONTINUOUS
Status: DISCONTINUED | OUTPATIENT
Start: 2018-03-23 | End: 2018-03-23 | Stop reason: HOSPADM

## 2018-03-23 RX ORDER — GLYCOPYRROLATE 0.2 MG/ML
INJECTION INTRAMUSCULAR; INTRAVENOUS AS NEEDED
Status: DISCONTINUED | OUTPATIENT
Start: 2018-03-23 | End: 2018-03-23 | Stop reason: HOSPADM

## 2018-03-23 RX ORDER — NALOXONE HYDROCHLORIDE 0.4 MG/ML
0.1 INJECTION, SOLUTION INTRAMUSCULAR; INTRAVENOUS; SUBCUTANEOUS AS NEEDED
Status: DISCONTINUED | OUTPATIENT
Start: 2018-03-23 | End: 2018-03-23 | Stop reason: HOSPADM

## 2018-03-23 RX ORDER — MIDAZOLAM HYDROCHLORIDE 1 MG/ML
INJECTION, SOLUTION INTRAMUSCULAR; INTRAVENOUS AS NEEDED
Status: DISCONTINUED | OUTPATIENT
Start: 2018-03-23 | End: 2018-03-23 | Stop reason: HOSPADM

## 2018-03-23 RX ORDER — SODIUM CHLORIDE, SODIUM LACTATE, POTASSIUM CHLORIDE, CALCIUM CHLORIDE 600; 310; 30; 20 MG/100ML; MG/100ML; MG/100ML; MG/100ML
125 INJECTION, SOLUTION INTRAVENOUS CONTINUOUS
Status: DISCONTINUED | OUTPATIENT
Start: 2018-03-23 | End: 2018-03-23 | Stop reason: HOSPADM

## 2018-03-23 RX ORDER — ONDANSETRON 4 MG/1
8 TABLET, ORALLY DISINTEGRATING ORAL
Qty: 10 TAB | Refills: 0 | Status: SHIPPED | OUTPATIENT
Start: 2018-03-23 | End: 2021-05-10 | Stop reason: CLARIF

## 2018-03-23 RX ADMIN — SODIUM CHLORIDE, SODIUM LACTATE, POTASSIUM CHLORIDE, AND CALCIUM CHLORIDE 125 ML/HR: 600; 310; 30; 20 INJECTION, SOLUTION INTRAVENOUS at 15:21

## 2018-03-23 RX ADMIN — GLYCOPYRROLATE 0.2 MG: 0.2 INJECTION INTRAMUSCULAR; INTRAVENOUS at 16:20

## 2018-03-23 RX ADMIN — FENTANYL CITRATE 100 MCG: 50 INJECTION, SOLUTION INTRAMUSCULAR; INTRAVENOUS at 16:27

## 2018-03-23 RX ADMIN — MIDAZOLAM HYDROCHLORIDE 2 MG: 1 INJECTION, SOLUTION INTRAMUSCULAR; INTRAVENOUS at 16:20

## 2018-03-23 RX ADMIN — SODIUM CHLORIDE, SODIUM LACTATE, POTASSIUM CHLORIDE, AND CALCIUM CHLORIDE: 600; 310; 30; 20 INJECTION, SOLUTION INTRAVENOUS at 16:52

## 2018-03-23 RX ADMIN — ONDANSETRON 4 MG: 2 INJECTION INTRAMUSCULAR; INTRAVENOUS at 16:29

## 2018-03-23 RX ADMIN — DEXAMETHASONE SODIUM PHOSPHATE 4 MG: 4 INJECTION, SOLUTION INTRA-ARTICULAR; INTRALESIONAL; INTRAMUSCULAR; INTRAVENOUS; SOFT TISSUE at 16:29

## 2018-03-23 RX ADMIN — LEVOFLOXACIN 500 MG: 5 INJECTION, SOLUTION INTRAVENOUS at 16:34

## 2018-03-23 RX ADMIN — SODIUM CHLORIDE, SODIUM LACTATE, POTASSIUM CHLORIDE, AND CALCIUM CHLORIDE 125 ML/HR: 600; 310; 30; 20 INJECTION, SOLUTION INTRAVENOUS at 17:48

## 2018-03-23 RX ADMIN — PROPOFOL 150 MG: 10 INJECTION, EMULSION INTRAVENOUS at 16:27

## 2018-03-23 RX ADMIN — LIDOCAINE HYDROCHLORIDE 80 MG: 20 INJECTION, SOLUTION EPIDURAL; INFILTRATION; INTRACAUDAL; PERINEURAL at 16:27

## 2018-03-23 NOTE — BRIEF OP NOTE
BRIEF OPERATIVE NOTE    Date of Procedure: 3/23/2018   Preoperative Diagnosis: hydronephrosiswith uretal calulus  Postoperative Diagnosis: same    Procedure(s):  CYSTOSCOPY, RIGHT RPG WITH INTERPRETATION, RIGHT URETEROSCOPY, LASER LITHO WITH HOLMIUM, STENT PLACEMENT, C-ARM  Surgeon(s) and Role:     * Loretha Osler, MD - Primary         Assistant Staff: None      Surgical Staff:  Circ-1: Amber Hummel  Radiology Technician: Lorenzo Victor  Scrub Tech-1: Deborrah Bosworth  Surg Asst-1: Jeana Patino  Event Time In   Incision Start 1635   Incision Close      Anesthesia: General   Estimated Blood Loss: NONE  Specimens:   ID Type Source Tests Collected by Time Destination   1 : Right Ureteral Stones Fresh URETER, RIGHT  Loretha Osler, MD 3/23/2018 1702 Pathology      Findings: STONE WAS VERY EASILY FRAGMETNED AND EXTRACTED.      Complications: NONE  Implants: 4.8 FR vl STENT ON  A STRING

## 2018-03-23 NOTE — ANESTHESIA PREPROCEDURE EVALUATION
Anesthetic History     PONV          Review of Systems / Medical History  Patient summary reviewed, nursing notes reviewed and pertinent labs reviewed    Pulmonary  Within defined limits                 Neuro/Psych   Within defined limits           Cardiovascular    Hypertension: well controlled        Dysrhythmias : atrial fibrillation      Exercise tolerance: >4 METS  Comments: paroxysmal   GI/Hepatic/Renal     GERD: well controlled           Endo/Other  Within defined limits    Hypothyroidism: well controlled  Arthritis     Other Findings              Physical Exam    Airway  Mallampati: III  TM Distance: 4 - 6 cm  Neck ROM: normal range of motion   Mouth opening: Normal     Cardiovascular    Rhythm: regular  Rate: normal         Dental    Dentition: Caps/crowns     Pulmonary  Breath sounds clear to auscultation               Abdominal  GI exam deferred       Other Findings            Anesthetic Plan    ASA: 2  Anesthesia type: general          Induction: Intravenous  Anesthetic plan and risks discussed with: Patient

## 2018-03-23 NOTE — DISCHARGE INSTRUCTIONS
Please Follow with Dr. Maral Palacio discharges instructions. .. DISCHARGE SUMMARY from Nurse    PATIENT INSTRUCTIONS:    After general anesthesia or intravenous sedation, for 24 hours or while taking prescription Narcotics:  · Limit your activities  · Do not drive and operate hazardous machinery  · Do not make important personal or business decisions  · Do  not drink alcoholic beverages  · If you have not urinated within 8 hours after discharge, please contact your surgeon on call. Report the following to your surgeon:  · Excessive pain, swelling, redness or odor of or around the surgical area  · Temperature over 100.5  · Nausea and vomiting lasting longer than 4 hours or if unable to take medications  · Any signs of decreased circulation or nerve impairment to extremity: change in color, persistent  numbness, tingling, coldness or increase pain  · Any questions    What to do at Home:  Recommended activity: ambulate in the house    If you experience any of the following symptoms not able to void, chills, fever, excessive blood in the urine, nausea & vomiting that you can not hold down food,  please follow up with Adela Cross. *  Please give a list of your current medications to your Primary Care Provider. *  Please update this list whenever your medications are discontinued, doses are      changed, or new medications (including over-the-counter products) are added. *  Please carry medication information at all times in case of emergency situations. These are general instructions for a healthy lifestyle:    No smoking/ No tobacco products/ Avoid exposure to second hand smoke  Surgeon General's Warning:  Quitting smoking now greatly reduces serious risk to your health.     Obesity, smoking, and sedentary lifestyle greatly increases your risk for illness    A healthy diet, regular physical exercise & weight monitoring are important for maintaining a healthy lifestyle    You may be retaining fluid if you have a history of heart failure or if you experience any of the following symptoms:  Weight gain of 3 pounds or more overnight or 5 pounds in a week, increased swelling in our hands or feet or shortness of breath while lying flat in bed. Please call your doctor as soon as you notice any of these symptoms; do not wait until your next office visit. Recognize signs and symptoms of STROKE:    F-face looks uneven    A-arms unable to move or move unevenly    S-speech slurred or non-existent    T-time-call 911 as soon as signs and symptoms begin-DO NOT go       Back to bed or wait to see if you get better-TIME IS BRAIN. Warning Signs of HEART ATTACK     Call 911 if you have these symptoms:   Chest discomfort. Most heart attacks involve discomfort in the center of the chest that lasts more than a few minutes, or that goes away and comes back. It can feel like uncomfortable pressure, squeezing, fullness, or pain.  Discomfort in other areas of the upper body. Symptoms can include pain or discomfort in one or both arms, the back, neck, jaw, or stomach.  Shortness of breath with or without chest discomfort.  Other signs may include breaking out in a cold sweat, nausea, or lightheadedness. Don't wait more than five minutes to call 911 - MINUTES MATTER! Fast action can save your life. Calling 911 is almost always the fastest way to get lifesaving treatment. Emergency Medical Services staff can begin treatment when they arrive -- up to an hour sooner than if someone gets to the hospital by car. The discharge information has been reviewed with the patient and caregiver. The patient and caregiver verbalized understanding. Discharge medications reviewed with the patient and caregiver and appropriate educational materials and side effects teaching were provided.     Patient armband removed and shredded     Cystoscopy: What to Expect at [facility] cystoscopy is a procedure that lets a doctor look inside of the bladder and the urethra. The urethra is the tube that carries urine from the bladder to outside the body. The doctor uses a thin, lighted tool called a cystoscope. Your bladder is filled with fluid. This stretches the bladder so that your doctor can look closely at the inside of your bladder. After the cystoscopy, your urethra may be sore at first, and it may burn when you urinate for the first few days after the procedure. You may feel the need to urinate more often, and your urine may be pink. These symptoms should get better in 1 or 2 days. You will probably be able to go back to most of your usual activities in 1 or 2 days. This care sheet gives you a general idea about how long it will take for you to recover. But each person recovers at a different pace. Follow the steps below to get better as quickly as possible. How can you care for yourself at home? Activity  ? · Rest when you feel tired. Getting enough sleep will help you recover. ? · Try to walk each day. Start by walking a little more than you did the day before. Bit by bit, increase the amount you walk. Walking boosts blood flow and helps prevent pneumonia and constipation. ? · Avoid strenuous activities, such as bicycle riding, jogging, weight lifting, or aerobic exercise, until your doctor says it is okay. ? · Ask your doctor when you can drive again. ? · Most people are able to return to work within 1 or 2 days after the procedure. ? · You may shower and take baths as usual.   ? · Ask your doctor when it is okay for you to have sex. Diet  ? · You can eat your normal diet. If your stomach is upset, try bland, low-fat foods like plain rice, broiled chicken, toast, and yogurt. ? · Drink plenty of fluids (unless your doctor tells you not to). Medicines  ? · Take pain medicines exactly as directed. ¨ If the doctor gave you a prescription medicine for pain, take it as prescribed.   ¨ If you are not taking a prescription pain medicine, ask your doctor if you can take an over-the-counter medicine. ? · If you think your pain medicine is making you sick to your stomach:  ¨ Take your medicine after meals (unless your doctor has told you not to). ¨ Ask your doctor for a different pain medicine. ? · If your doctor prescribed antibiotics, take them as directed. Do not stop taking them just because you feel better. You need to take the full course of antibiotics. Follow-up care is a key part of your treatment and safety. Be sure to make and go to all appointments, and call your doctor if you are having problems. It's also a good idea to know your test results and keep a list of the medicines you take. When should you call for help? Call 911 anytime you think you may need emergency care. For example, call if:  ? · You passed out (lost consciousness). ? · You have severe trouble breathing. ? · You have sudden chest pain and shortness of breath, or you cough up blood. ? · You have severe belly pain. ?Call your doctor now or seek immediate medical care if:  ? · You are sick to your stomach or cannot keep fluids down. ? · Your urine is still red or you see blood clots after you have urinated several times. ? · You have trouble passing urine or stool, especially if you have pain or swelling in your lower belly. ? · You have signs of a blood clot, such as:  ¨ Pain in your calf, back of the knee, thigh, or groin. ¨ Redness and swelling in your leg or groin. ? · You develop a fever or severe chills. ? · You have pain in your back just below your rib cage. This is called flank pain. ? Watch closely for changes in your health, and be sure to contact your doctor if:  ? · You have pain or burning when you urinate. A burning feeling is normal for a day or two after the test, but call if it does not get better. ? · You have a frequent urge to urinate but can pass only small amounts of urine.    ? · Your urine is pink, red, or cloudy, or smells bad. It is normal for the urine to have a pinkish color for a few days after the test, but call if it does not get better. Where can you learn more? Go to http://isabel-kaleb.info/. Enter M545 in the search box to learn more about \"Cystoscopy: What to Expect at Home. \"  Current as of: May 12, 2017  Content Version: 11.4  © 0785-8723 [employer]. Care instructions adapted under license by [employer] (which disclaims liability or warranty for this information). If you have questions about a medical condition or this instruction, always ask your healthcare professional. [address] any warranty or liability for your use of this information.     ___________________________________________________________________________________________________________________________________

## 2018-03-23 NOTE — ANESTHESIA POSTPROCEDURE EVALUATION
Post-Anesthesia Evaluation & Assessment    Visit Vitals    /70    Pulse 76    Temp 37.3 °C (99.1 °F)    Resp 19    Ht 5' 6\" (1.676 m)    Wt 71 kg (156 lb 7 oz)    SpO2 98%    BMI 25.25 kg/m2       No untreated/active PONV    Post-operative hydration adequate. Adequate post-operative analgesia per PACU discharge criteria    Mental status & level of consciousness: alert and oriented x 3    Respiratory status: patent unassisted airway     No apparent anesthetic complications requiring additional post-anesthetic care    Patient has met all discharge requirements.             Blair Berger MD

## 2018-03-23 NOTE — PERIOP NOTES
Pt left stable with family. Folder with discharge instruction and prescription given. Arm band shredded. IV access discontinue. No further questions. End of PeriOp care.

## 2018-03-23 NOTE — H&P (VIEW-ONLY)
A    [facility] PCP SPECIAL[facility]  [address], [address] [facility]  phone: [phone]  fax: [phone]          Patient: Carmenza Marie  YOB: 1945        Assessment/Plan  # Detail Type Description    1. Assessment Hydronephrosis with ureteral calculous obstruction (N13.2). Patient Plan She had her right distal ureteral stone. It is approx 640 hounsfield units on CT scan. It is [id number]. We discussed risk of ESWL vs ureteroscopy. She wishes to proceed with ESWL. Risk fo bleeding, infection, possible need fro more than 1 procedure to remain stone free were discussed. She will proceed. 2. Assessment Kidney stone (N20.0). Patient Plan She has bilateral lower pole stones. We discussed that these are unlikely the source of problem as about only 20% will actually pass or cause pain in the future. This 81year old female presents for Kidney and/or Ureteral Stone and UTI. History of Present Illness:  1. Kidney and/or Ureteral Stone      Onset was 27 years ago. Severity level is moderate. It occurs intermittently. The problem is worse. Location of pain is right flank. The pain radiates to the groin. The patient lists no relieving factors. Associated symptoms include nausea and pain. Pertinent negatives include chills, constipation, diarrhea, fever and vomiting. Additional information: She has had 12 stones in the past -- she has had several ESWL and ureteoscopies. US (10/31/17): 1cm LLP stone and a 5mm RLP stone. 3/16/18 - for the past 5 weeks she has had intermittent right flank pain and nausea. CT (3/14/18) - 4x6mm RLP stone and 8mm LLP stone and a 4x6mm right distal ureteral stone (645 HU). .      2.  UTI      The onset was 1 year ago. The severity of the problem is moderate. The problem has worsened. The symptoms are recurring. Presenting/Initial symptoms include dysuria.  Pertinent history includes being over 52 and being postmenopausal. Pertinent history does not include diabetes or alcohol consumption. Denies aggravating factors. She denies relieving factors. Pertinent negatives include constipation. Additional information: She had 2 UTI's in 5/2017 -- She also had terrible c diff too. PAST MEDICAL/SURGICAL HISTORY   (Reviewed, updated)    Disease/disorder Onset Date Management Date Comments     D&C 2017      Hysterectomy, total 2017      trigger finger surgery 2005      Cholecystectomy 1996      Appendectomy 1980      Arthroscopy knee       cataract extraction and lens implantation       uterine suspension     Allergies       GERD       Hx Renal Calculi       Hyperlipidemia       KUB 9/12/13 ([facility])       Osteoarthritis       Post Menopausal       Urolithiasis         DIAGNOSTICS HISTORY:  Test Ordered Interpretation Result completed   COLONOSCOPY AND BIOPSY  abnormal See scanned report. 06/13/2013   * Dexa, Bone Density Scan  abnormal See scanned report. 10/22/2013   * X-RAY EXAM OF ABDOMEN KUB ADULT  abnormal See scanned report. 03/25/2015   COLONOSCOPY AND BIOPSY   See scanned report. 06/13/2013   ULTRASOUND OF ABDOMEN  normal See scanned report. 12/15/2015   ESOPHAGUS ENDOSCOPY, Biopsy Single Or Multiple  normal  01/04/2016   * Screening mammography digital  normal  03/19/2015   *Echocardiography Trans Complete  see detail EF 0.60 (60%). 09/16/2016   Echo/MUGA - Ejection Fraction 09/16/2016  EF 0.60 (60%) 09/18/2016   * Screening mammography digital   See scanned report.  01/17/2017     Test Ordered Ordering Comments Modifier   COLONOSCOPY AND BIOPSY      * Dexa, Bone Density Scan      * X-RAY EXAM OF ABDOMEN KUB ADULT  calculi LT    COLONOSCOPY AND BIOPSY  per Dr Kristal Robles 5 year recall    ULTRASOUND OF ABDOMEN      ESOPHAGUS ENDOSCOPY, Biopsy Single Or Multiple      * Screening mammography digital      *Echocardiography Trans Complete      Echo/MUGA - Ejection Fraction 09/16/2016     * Screening mammography digital GYNECOLOGIC HISTORY:  Patient is postmenopausal.       PROBLEM LIST:  Problem Description Onset Date   Hyperlipidemia 2011   Gastroesophageal reflux disease 2011   Anxiety disorder 2011   Disorder of bone and articular cartilage 2011   Insomnia 2011   Osteoporosis 2014   Arthropathy 2014   Neoplasm of uncertain behavior of skin 2012   No active problems    Allergies  Ingredient Reaction Medication Name Comment   DENOSUMAB  Prolia    FORMALDEHYDE      GOLD SODIUM THIOMALATE      DOXYCYCLINE hives     OXYCODONE HCL  PERCOCET    ACETAMINOPHEN  PERCOCET    TETRACYCLINE Hives/Skin Rash     SULFANILAMIDE      AMOXICILLIN TRIHYDRATE   AUGMENTIN   CIPROFLOXACIN      POTASSIUM CLAVULANATE   AUGMENTIN   PENICILLIN V      CEPHALEXIN MONOHYDRATE   Keflex   ACETAMINOPHEN   Tylenol-Codeine   ASPIRIN      HYDROCODONE BITARTRATE   Vicodin   ERYTHROMYCIN BASE      MEPERIDINE HCL   Demerol   ACETAMINOPHEN   Vicodin   CODEINE PHOSPHATE   Tylenol-Codeine     Family History:  (Reviewed, updated)  Relationship Family Member Name  Age at Death Condition Onset Age Cause of Death       Family history of Thyroid disorder  N   Father  Y    N   Father    Hypertension  N   Father  Y  Cancer, lung  Y   Mother    Heart disease  N   Mother    Alzheimer's Disease  N   Mother    Thyroid disorder  N   Mother    Congestive heart failure  N   Sister    Osteoarthritis  N   Sister    Cancer, lung  N   Sister    Crohn's disease  N   Sister    Hyperthyroidism  N   Sister    Colon polyps  N   Sister    Cancer  N   Sister    Thyroid disorder  N   Sister    Depression  N         Social History:  (Reviewed, updated)  The patient is right-handed. Preferred language is [place]. MARITAL STATUS/FAMILY/SOCIAL SUPPORT  Currently . Tobacco use status: Never smoked tobacco.  Smoking status: Never smoker. No passive smoke exposure. ALCOHOL  There is no history of alcohol use. CAFFEINE  The patient uses caffeine: tea and soda. - 3 cups a day. LIFESTYLE  light activity level. Never exercises. DIET  healthy. Caodaism/SPIRITUAL  Patient agrees to transfusion. Advance Directives:  STATUS  Last reviewed on:     DIRECTIVES  Transfusion: Patient agrees to transfusion. Review of Systems  System Neg/Pos Details   Constitutional Positive Pain. Constitutional Negative Chills and fever. ENMT Negative Ear infections and sore throat. Eyes Negative Blurred vision, double vision and eye pain. Respiratory Negative Asthma, chronic cough, dyspnea and wheezing. Cardio Negative Chest pain. GI Negative Constipation, decreased appetite, diarrhea, nausea and vomiting. Endocrine Negative Cold intolerance, heat intolerance, increased thirst and weight loss. Neuro Negative Headache and tremors. Psych Negative Anxiety and depression. Integumentary Negative Itching skin and rash. MS Negative Back pain and joint pain. Hema/Lymph Negative Easy bleeding. Physical Exam  Exam Findings Details   Constitutional Normal Well developed. Neck Exam Normal Inspection - Normal.   Respiratory Normal Inspection - Normal.   Abdomen Normal No abdominal tenderness. Genitourinary Normal No Suprapubic tenderness. No CVA tenderness. Extremity Normal No Edema. Psychiatric Normal Oriented to time, place, person and situation. Appropriate mood and affect.          Medications (added, continued, or stopped today)  Started Medication Directions Instruction Stopped   09/01/2017 Align 4 mg capsule apply 1 by Oral route nce a day     09/11/2016 Aspir-81 81 mg tablet,delayed release take 1 tablet by oral route  every day     05/24/2017 azelastine 0.15 % (205.5 mcg) nasal spray spray 1 spray by intranasal route 2 times every day in each nostril     05/24/2017 Bentyl 20 mg tablet take 1 tablet by oral route 3 times every day     03/12/2018 butalbital-acetaminophen-caffeine 50 mg-300 mg-40 mg capsule take 1  capsule by oral route  every 4 hours as needed not to exceed 6 capsules per 24hrs      CALCIUM + VIT D otc     09/11/2016 Centrum Silver Women 8 mg iron-400 mcg-300 mcg tablet take one tablet by oral route daily     02/06/2018 clonazepam 1 mg tablet take 1 tablet by oral route  every day     06/19/2017 DEXILANT 60 MG CAPSULE, DELAYED RELEASE TAKE 1 CAPSULE BY ORAL ROUTE  EVERY DAY  03/16/2018 03/16/2018 DEXILANT DR CAPS 60MG TAKE 1 CAPSULE DAILY     03/12/2018 diltiazem  mg capsule,extended release 24 hr take 1 capsule by oral route  every day     11/02/2017 famotidine 20 mg tablet take 1 tablet by oral route  every day     02/14/2018 Flomax 0.4 mg capsule take 1 capsule by oral route  every day 1/2 hour following the same meal each day     10/19/2016 Flonase 50 mcg/actuation nasal spray,suspension spray1- 2 Spray(s) by Nasal route 1 time every day as needed     02/07/2018 Lunesta 2 mg tablet take 1 tablet (2MG)  by oral route  every day at bedtime     03/12/2018 Multaq 400 mg tablet take 1 tablet by oral route 2 times every day with morning and evening meals     10/18/2017 potassium chloride ER 10 mEq capsule,extended release take 2 capsule by oral route  every day with food     09/06/2017 Probiotic 10 billion cell capsule take 1 tablet by oral route  every day     11/02/2017 simvastatin 20 mg tablet take 1 tablet by oral route  every day in the evening     03/16/2018 tramadol 50 mg tablet take 1 tablet by oral route  every 6 hours as needed     03/12/2018 tramadol 50 mg tablet take 1 tablet by oral route 2 times every day as needed     01/06/2018 triamcinolone acetonide 0.1 % topical cream apply by topical route 2 times every day a thin layer to the affected area(s)     03/12/2018 valacyclovir 500 mg tablet take 2 tablets by oral route daily for 5 days as needed  02/13/2020 09/11/2016 Vitamin D3 2,000 unit capsule take one capsule by oral route daily     03/12/2018 Xarelto 20 mg tablet take 1 tablet by oral route  every day with the evening meal     03/16/2018 Zofran ODT 4 mg disintegrating tablet place 2 tablet by translingual route  every 8 hours for 2 days on top of the tongue where they will dissolve, then swallow  03/17/2018 11/02/2017 Zyrtec 10 mg tablet take 1 tablet by ORAL route  every bedtime     Completed by:              Yoan Estevez MD

## 2018-03-23 NOTE — IP AVS SNAPSHOT
[facility] 
 
 
 [address] [address] 
[phone] Patient: Radha Palacio MRN: [id number] :1945 About your hospitalization You were admitted on:  2018 You last received care in the:  [facility] PHASE 2 RECOVERY You were discharged on:  2018 Why you were hospitalized Your primary diagnosis was:  Ureteral Stone With Hydronephrosis Follow-up Information Follow up With Details Comments Contact Info Sebas Linda MD   [address] [place] [address] [address] [address] 
[phone] Mamta Cooper MD Schedule an appointment as soon as possible for a visit in 3 day(s)  [address] [address] 
[phone] Discharge Orders None A check amanda indicates which time of day the medication should be taken. My Medications CONTINUE taking these medications Instructions Each Dose to Equal  
 Morning Noon Evening Bedtime ALIGN 4 mg Cap Generic drug:  Bifidobacterium Infantis Your last dose was: Your next dose is: Take 1 Cap by mouth daily. 1 Cap  
    
   
   
   
  
 aspirin delayed-release 81 mg tablet Your last dose was: Your next dose is: Take  by mouth daily. Azelastine 0.15 % (205.5 mcg) nasal spray Commonly known as:  ASTEPRO Your last dose was: Your next dose is:    
   
   
 1 Spray by Both Nostrils route two (2) times a day. 1 Spray BENTYL 20 mg tablet Generic drug:  dicyclomine Your last dose was: Your next dose is: Take 20 mg by mouth three (3) times daily. 20 mg CALCIUM + D PO Your last dose was: Your next dose is: Take 1 Tab by mouth daily. 1 Tab CENTRUM SILVER ULTRA WOMEN'S PO Your last dose was: Your next dose is: Take 1 Tab by mouth daily. 1 Tab Cetirizine 10 mg Cap Your last dose was: Your next dose is: Take 10 mg by mouth nightly. 10 mg DEXILANT 30 mg capsule Generic drug:  dexlansoprazole Your last dose was: Your next dose is: Take 60 mg by mouth daily. 60 mg  
    
   
   
   
  
 dilTIAZem  mg Tb24 tablet Commonly known as:  CARDIZEM LA Your last dose was: Your next dose is: Take 240 mg by mouth daily. 240 mg  
    
   
   
   
  
 famotidine 20 mg tablet Commonly known as:  PEPCID Your last dose was: Your next dose is: Take 20 mg by mouth daily. 20 mg  
    
   
   
   
  
 FIORINAL capsule Generic drug:  butalbital-aspirin-caffeine Your last dose was: Your next dose is: Take 1 Cap by mouth every four (4) hours as needed for Pain. 1 Cap FLOMAX 0.4 mg capsule Generic drug:  tamsulosin Your last dose was: Your next dose is: Take 0.4 mg by mouth daily. 0.4 mg  
    
   
   
   
  
 FLONASE ALLERGY RELIEF 50 mcg/actuation nasal spray Generic drug:  fluticasone Your last dose was: Your next dose is: 2 Sprays by Both Nostrils route daily as needed for Rhinitis. 2 Spray KlonoPIN 1 mg tablet Generic drug:  clonazePAM  
   
Your last dose was: Your next dose is: Take 1 mg by mouth daily. Indications: PANIC DISORDER  
 1 mg LUNESTA 2 mg tablet Generic drug:  eszopiclone Your last dose was: Your next dose is: Take 2 mg by mouth nightly. 2 mg MULTAQ Tab tablet Generic drug:  dronedarone Your last dose was: Your next dose is: Take 400 mg by mouth two (2) times daily (with meals). 400 mg  
    
   
   
   
  
 ondansetron 4 mg disintegrating tablet Commonly known as:  ZOFRAN ODT Your last dose was: Your next dose is: Take 2 Tabs by mouth every eight (8) hours as needed for Nausea. 8 mg  
    
   
   
   
  
 potassium chloride SR 10 mEq tablet Commonly known as:  KLOR-CON 10 Your last dose was: Your next dose is: Take 20 mEq by mouth daily. 20 mEq  
    
   
   
   
  
 traMADol 50 mg tablet Commonly known as:  ULTRAM  
   
Your last dose was: Your next dose is: Take 1 Tab by mouth two (2) times daily as needed for Pain. Max Daily Amount: 100 mg.  
 50 mg  
    
   
   
   
  
 triamcinolone acetonide 0.1 % topical cream  
Commonly known as:  KENALOG Your last dose was: Your next dose is:    
   
   
 Apply  to affected area two (2) times a day. use thin layer  
     
   
   
   
  
 valACYclovir 500 mg tablet Commonly known as:  VALTREX Your last dose was: Your next dose is: Take 500 mg by mouth two (2) times daily as needed. 500 mg  
    
   
   
   
  
 VITAMIN D3 1,000 unit tablet Generic drug:  cholecalciferol Your last dose was: Your next dose is: Take 2,000 Units by mouth daily. 2000 Units XARELTO 20 mg Tab tablet Generic drug:  rivaroxaban Your last dose was: Your next dose is: Take 20 mg by mouth daily (with dinner). 20 mg  
    
   
   
   
  
 ZOCOR 40 mg tablet Generic drug:  simvastatin Your last dose was: Your next dose is: Take 20 mg by mouth nightly. 20 mg Where to Get Your Medications Information on where to get these meds will be given to you by the nurse or doctor. ! Ask your nurse or doctor about these medications ondansetron 4 mg disintegrating tablet  
 traMADol 50 mg tablet Discharge Instructions Please Follow with Dr. Mau Hare discharges instructions. .. DISCHARGE SUMMARY from Nurse PATIENT INSTRUCTIONS: 
 
 
F-face looks uneven A-arms unable to move or move unevenly S-speech slurred or non-existent T-time-call 911 as soon as signs and symptoms begin-DO NOT go Back to bed or wait to see if you get better-TIME IS BRAIN. Warning Signs of HEART ATTACK Call 911 if you have these symptoms: 
? Chest discomfort. Most heart attacks involve discomfort in the center of the chest that lasts more than a few minutes, or that goes away and comes back. It can feel like uncomfortable pressure, squeezing, fullness, or pain. ? Discomfort in other areas of the upper body. Symptoms can include pain or discomfort in one or both arms, the back, neck, jaw, or stomach. ? Shortness of breath with or without chest discomfort. ? Other signs may include breaking out in a cold sweat, nausea, or lightheadedness. Don't wait more than five minutes to call [address]! Fast action can save your life. Calling 911 is almost always the fastest way to get lifesaving treatment. Emergency Medical Services staff can begin treatment when they arrive  up to an hour sooner than if someone gets to the hospital by car. The discharge information has been reviewed with the patient and caregiver. The patient and caregiver verbalized understanding. Discharge medications reviewed with the patient and caregiver and appropriate educational materials and side effects teaching were provided. Patient armband removed and shredded Cystoscopy: What to Expect at [facility] Your Recovery A cystoscopy is a procedure that lets a doctor look inside of the bladder and the urethra. The urethra is the tube that carries urine from the bladder to outside the body. The doctor uses a thin, lighted tool called a cystoscope. Your bladder is filled with fluid. This stretches the bladder so that your doctor can look closely at the inside of your bladder. After the cystoscopy, your urethra may be sore at first, and it may burn when you urinate for the first few days after the procedure. You may feel the need to urinate more often, and your urine may be pink. These symptoms should get better in 1 or 2 days. You will probably be able to go back to most of your usual activities in 1 or 2 days. This care sheet gives you a general idea about how long it will take for you to recover. But each person recovers at a different pace. Follow the steps below to get better as quickly as possible. How can you care for yourself at home? Activity ? · Rest when you feel tired. Getting enough sleep will help you recover. ? · Try to walk each day. Start by walking a little more than you did the day before. Bit by bit, increase the amount you walk. Walking boosts blood flow and helps prevent pneumonia and constipation. ? · Avoid strenuous activities, such as bicycle riding, jogging, weight lifting, or aerobic exercise, until your doctor says it is okay. ? · Ask your doctor when you can drive again. ? · Most people are able to return to work within 1 or 2 days after the procedure. ? · You may shower and take baths as usual.  
? · Ask your doctor when it is okay for you to have sex. Diet ? · You can eat your normal diet. If your stomach is upset, try bland, low-fat foods like plain rice, broiled chicken, toast, and yogurt. ? · Drink plenty of fluids (unless your doctor tells you not to). Medicines ? · Take pain medicines exactly as directed. ¨ If the doctor gave you a prescription medicine for pain, take it as prescribed. ¨ If you are not taking a prescription pain medicine, ask your doctor if you can take an over-the-counter medicine. ? · If you think your pain medicine is making you sick to your stomach: 
¨ Take your medicine after meals (unless your doctor has told you not to). ¨ Ask your doctor for a different pain medicine. ? · If your doctor prescribed antibiotics, take them as directed. Do not stop taking them just because you feel better. You need to take the full course of antibiotics. Follow-up care is a key part of your treatment and safety. Be sure to make and go to all appointments, and call your doctor if you are having problems. It's also a good idea to know your test results and keep a list of the medicines you take. When should you call for help? Call 911 anytime you think you may need emergency care. For example, call if: 
? · You passed out (lost consciousness). ? · You have severe trouble breathing. ? · You have sudden chest pain and shortness of breath, or you cough up blood. ? · You have severe belly pain. ?Call your doctor now or seek immediate medical care if: 
? · You are sick to your stomach or cannot keep fluids down. ? · Your urine is still red or you see blood clots after you have urinated several times. ? · You have trouble passing urine or stool, especially if you have pain or swelling in your lower belly. ? · You have signs of a blood clot, such as: 
¨ Pain in your calf, back of the knee, thigh, or groin. ¨ Redness and swelling in your leg or groin. ? · You develop a fever or severe chills. ? · You have pain in your back just below your rib cage. This is called flank pain. ? Watch closely for changes in your health, and be sure to contact your doctor if: 
? · You have pain or burning when you urinate.  A burning feeling is normal for a day or two after the test, but call if it does not get better. ? · You have a frequent urge to urinate but can pass only small amounts of urine. ? · Your urine is pink, red, or cloudy, or smells bad. It is normal for the urine to have a pinkish color for a few days after the test, but call if it does not get better. Where can you learn more? Go to http://isabel-kaleb.info/. Enter Y344 in the search box to learn more about \"Cystoscopy: What to Expect at Home. \" Current as of: May 12, 2017 Content Version: 11.4 © 1323-5276 [employer]. Care instructions adapted under license by [employer] (which disclaims liability or warranty for this information). If you have questions about a medical condition or this instruction, always ask your healthcare professional. [address] any warranty or liability for your use of this information. ___________________________________________________________________________________________________________________________________ Introducing [facility]! Horace Vazquez introduces [employer] patient portal. Now you can access parts of your medical record, email your doctor's office, and request medication refills online. 1. In your internet browser, go to [email or web address]://[employer] [place] - 2. [employer] 2. Click on the First Time User? Click Here link in the Sign In box. You will see the New Member Sign Up page. 3. Enter your [employer] Access Code exactly as it appears below. You will not need to use this code after youve completed the sign-up process. If you do not sign up before the expiration date, you must request a new code. · [employer] Access Code: [email or web address] Expires: 6/14/2018 12:03 PM 
 
4. Enter the last four digits of your Social Security Number (xxxx) and Date of Birth (mm/dd/yyyy) as indicated and click Submit. You will be taken to the next sign-up page. 5. Create a imgix ID. This will be your imgix login ID and cannot be changed, so think of one that is secure and easy to remember. 6. Create a [employer]ix password. You can change your password at any time. 7. Enter your Password Reset Question and Answer. This can be used at a later time if you forget your password. 8. Enter your e-mail address. You will receive e-mail notification when new information is available in [address] 9. Click Sign Up. You can now view and download portions of your medical record. 10. Click the Download Summary menu link to download a portable copy of your medical information. If you have questions, please visit the Frequently Asked Questions section of the [employer]ix website. Remember, [employer] is NOT to be used for urgent needs. For medical emergencies, dial 911. Now available from your iPhone and Android! Unresulted Labs-Please follow up with your PCP about these lab tests Order Current Status XR RETROGRADE PYELOGRAM In process Providers Seen During Your Hospitalization Provider Specialty Primary office phone Reji Garcia MD Urology [phone] Your Primary Care Physician (PCP) Primary Care Physician Office Phone Office Fax [address] [address] [phone] You are allergic to the following Allergen Reactions Acetaminophen Unable to Obtain Acetaminophen-Codeine Other (comments) Amoxicillin Hives Aspirin Hives Augmentin (Amoxicillin-Pot Clavulanate) Other (comments) Cephalexin Hives Nausea and Vomiting Ciprofloxacin Other (comments) GI distress Clavulanic Acid Unknown (comments) Codeine Hives Doxycycline Hives Eryc (Erythromycin) Other (comments) Gi ditress Formaldehyde Unknown (comments) Gold Sodium Thiomalate Unknown (comments) Hydrocodone Unknown (comments) Hydrocodone-Acetaminophen Rash Other reaction(s): mild rash/itching Meperidine Hives Neomycin Other (comments) Nsaids (Non-Steroidal Anti-Inflammatory Drug) Unknown (comments) Oxycodone Unknown (comments) Penicillin G Hives Penicillin G Potassium In D5w Unknown (comments) Percocet (Oxycodone-Acetaminophen) Other (comments) Other reaction(s): gi distress GI Potassium Unknown (comments) Prolia (Denosumab) Hives Unknown (comments) Resorcinol-Calamine-Zinc Oxide Other (comments) Sulfa (Sulfonamide Antibiotics) Hives Sulfanilamide Unknown (comments) Tetracycline Hives Rash Recent Documentation Height Weight BMI OB Status Smoking Status 1.676 m 71 kg 25.25 kg/m2 Postmenopausal Never Smoker Emergency Contacts Name Discharge Info Relation Home Work Mobile Vicente Samuel DISCHARGE CAREGIVER [3] Spouse [3] [phone] Patient Belongings The following personal items are in your possession at time of discharge: 
  Dental Appliances: None         Home Medications: None   Jewelry: None  Clothing: Pants, Shirt, Footwear, Jacket/Coat, Socks, Undergarments Please provide this summary of care documentation to your next provider. Signatures-by signing, you are acknowledging that this After Visit Summary has been reviewed with you and you have received a copy. Patient Signature:  ____________________________________________________________ Date:  ____________________________________________________________  
  
Nicole Oka Provider Signature:  ____________________________________________________________ Date:  ____________________________________________________________

## 2018-03-23 NOTE — INTERVAL H&P NOTE
H&P Update:  Charly Manzo was seen and examined. History and physical has been reviewed. Significant clinical changes have occurred as noted:  She had a right ESWL with only partial fragmentation and she is miserable.   She presents for right ureteroscopy and laser lithotripsy      Signed By: Gladys Diana MD     March 23, 2018 4:17 PM

## 2018-03-30 ENCOUNTER — APPOINTMENT (OUTPATIENT)
Dept: GENERAL RADIOLOGY | Age: 73
End: 2018-03-30
Attending: UROLOGY
Payer: MEDICARE

## 2018-03-30 ENCOUNTER — HOSPITAL ENCOUNTER (OUTPATIENT)
Age: 73
Setting detail: OUTPATIENT SURGERY
Discharge: HOME | End: 2018-03-30
Attending: UROLOGY | Admitting: UROLOGY
Payer: MEDICARE

## 2018-03-30 ENCOUNTER — ANESTHESIA (OUTPATIENT)
Dept: SURGERY | Age: 73
End: 2018-03-30
Payer: MEDICARE

## 2018-03-30 ENCOUNTER — ANESTHESIA EVENT (OUTPATIENT)
Dept: SURGERY | Age: 73
End: 2018-03-30
Payer: MEDICARE

## 2018-03-30 VITALS
DIASTOLIC BLOOD PRESSURE: 73 MMHG | OXYGEN SATURATION: 99 % | TEMPERATURE: 97.6 F | SYSTOLIC BLOOD PRESSURE: 142 MMHG | RESPIRATION RATE: 14 BRPM | HEART RATE: 76 BPM

## 2018-03-30 DIAGNOSIS — N20.0 CALCULUS OF KIDNEY: Primary | ICD-10-CM

## 2018-03-30 PROCEDURE — 76010000138 HC OR TIME 0.5 TO 1 HR: Performed by: UROLOGY

## 2018-03-30 PROCEDURE — 74011250636 HC RX REV CODE- 250/636

## 2018-03-30 PROCEDURE — C1758 CATHETER, URETERAL: HCPCS | Performed by: UROLOGY

## 2018-03-30 PROCEDURE — 74420 UROGRAPHY RTRGR +-KUB: CPT

## 2018-03-30 PROCEDURE — 74011000250 HC RX REV CODE- 250

## 2018-03-30 PROCEDURE — 74011636320 HC RX REV CODE- 636/320: Performed by: UROLOGY

## 2018-03-30 PROCEDURE — 76210000021 HC REC RM PH II 0.5 TO 1 HR: Performed by: UROLOGY

## 2018-03-30 PROCEDURE — 74011250636 HC RX REV CODE- 250/636: Performed by: UROLOGY

## 2018-03-30 PROCEDURE — 76210000063 HC OR PH I REC FIRST 0.5 HR: Performed by: UROLOGY

## 2018-03-30 PROCEDURE — 76060000032 HC ANESTHESIA 0.5 TO 1 HR: Performed by: UROLOGY

## 2018-03-30 PROCEDURE — 77030018836 HC SOL IRR NACL ICUM -A: Performed by: UROLOGY

## 2018-03-30 PROCEDURE — C2617 STENT, NON-COR, TEM W/O DEL: HCPCS | Performed by: UROLOGY

## 2018-03-30 PROCEDURE — 77030012508 HC MSK AIRWY LMA AMBU -A: Performed by: ANESTHESIOLOGY

## 2018-03-30 PROCEDURE — 77030013079 HC BLNKT BAIR HGGR 3M -A: Performed by: ANESTHESIOLOGY

## 2018-03-30 PROCEDURE — C1769 GUIDE WIRE: HCPCS | Performed by: UROLOGY

## 2018-03-30 DEVICE — URETERAL STENT
Type: IMPLANTABLE DEVICE | Site: URETER | Status: FUNCTIONAL
Brand: POLARIS™ ULTRA

## 2018-03-30 RX ORDER — MIDAZOLAM HYDROCHLORIDE 1 MG/ML
INJECTION, SOLUTION INTRAMUSCULAR; INTRAVENOUS AS NEEDED
Status: DISCONTINUED | OUTPATIENT
Start: 2018-03-30 | End: 2018-03-30 | Stop reason: HOSPADM

## 2018-03-30 RX ORDER — SODIUM CHLORIDE 0.9 % (FLUSH) 0.9 %
5-10 SYRINGE (ML) INJECTION AS NEEDED
Status: DISCONTINUED | OUTPATIENT
Start: 2018-03-30 | End: 2018-03-30 | Stop reason: HOSPADM

## 2018-03-30 RX ORDER — TRAMADOL HYDROCHLORIDE 50 MG/1
50 TABLET ORAL
Qty: 20 TAB | Refills: 0 | Status: SHIPPED | OUTPATIENT
Start: 2018-03-30

## 2018-03-30 RX ORDER — DEXTROSE 50 % IN WATER (D50W) INTRAVENOUS SYRINGE
25-50 AS NEEDED
Status: DISCONTINUED | OUTPATIENT
Start: 2018-03-30 | End: 2018-03-30 | Stop reason: HOSPADM

## 2018-03-30 RX ORDER — SODIUM CHLORIDE, SODIUM LACTATE, POTASSIUM CHLORIDE, CALCIUM CHLORIDE 600; 310; 30; 20 MG/100ML; MG/100ML; MG/100ML; MG/100ML
100 INJECTION, SOLUTION INTRAVENOUS CONTINUOUS
Status: DISCONTINUED | OUTPATIENT
Start: 2018-03-30 | End: 2018-03-30 | Stop reason: HOSPADM

## 2018-03-30 RX ORDER — ONDANSETRON 2 MG/ML
4 INJECTION INTRAMUSCULAR; INTRAVENOUS ONCE
Status: COMPLETED | OUTPATIENT
Start: 2018-03-30 | End: 2018-03-30

## 2018-03-30 RX ORDER — GLYCOPYRROLATE 0.2 MG/ML
INJECTION INTRAMUSCULAR; INTRAVENOUS AS NEEDED
Status: DISCONTINUED | OUTPATIENT
Start: 2018-03-30 | End: 2018-03-30 | Stop reason: HOSPADM

## 2018-03-30 RX ORDER — SODIUM CHLORIDE, SODIUM LACTATE, POTASSIUM CHLORIDE, CALCIUM CHLORIDE 600; 310; 30; 20 MG/100ML; MG/100ML; MG/100ML; MG/100ML
125 INJECTION, SOLUTION INTRAVENOUS CONTINUOUS
Status: DISCONTINUED | OUTPATIENT
Start: 2018-03-30 | End: 2018-03-30 | Stop reason: HOSPADM

## 2018-03-30 RX ORDER — FENTANYL CITRATE 50 UG/ML
INJECTION, SOLUTION INTRAMUSCULAR; INTRAVENOUS AS NEEDED
Status: DISCONTINUED | OUTPATIENT
Start: 2018-03-30 | End: 2018-03-30 | Stop reason: HOSPADM

## 2018-03-30 RX ORDER — NALOXONE HYDROCHLORIDE 0.4 MG/ML
0.1 INJECTION, SOLUTION INTRAMUSCULAR; INTRAVENOUS; SUBCUTANEOUS AS NEEDED
Status: DISCONTINUED | OUTPATIENT
Start: 2018-03-30 | End: 2018-03-30 | Stop reason: HOSPADM

## 2018-03-30 RX ORDER — LIDOCAINE HYDROCHLORIDE 20 MG/ML
INJECTION, SOLUTION EPIDURAL; INFILTRATION; INTRACAUDAL; PERINEURAL AS NEEDED
Status: DISCONTINUED | OUTPATIENT
Start: 2018-03-30 | End: 2018-03-30 | Stop reason: HOSPADM

## 2018-03-30 RX ORDER — PROPOFOL 10 MG/ML
INJECTION, EMULSION INTRAVENOUS AS NEEDED
Status: DISCONTINUED | OUTPATIENT
Start: 2018-03-30 | End: 2018-03-30 | Stop reason: HOSPADM

## 2018-03-30 RX ORDER — MAGNESIUM SULFATE 100 %
4 CRYSTALS MISCELLANEOUS AS NEEDED
Status: DISCONTINUED | OUTPATIENT
Start: 2018-03-30 | End: 2018-03-30 | Stop reason: HOSPADM

## 2018-03-30 RX ORDER — FENTANYL CITRATE 50 UG/ML
50 INJECTION, SOLUTION INTRAMUSCULAR; INTRAVENOUS
Status: DISCONTINUED | OUTPATIENT
Start: 2018-03-30 | End: 2018-03-30 | Stop reason: HOSPADM

## 2018-03-30 RX ORDER — INSULIN LISPRO 100 [IU]/ML
INJECTION, SOLUTION INTRAVENOUS; SUBCUTANEOUS ONCE
Status: DISCONTINUED | OUTPATIENT
Start: 2018-03-30 | End: 2018-03-30 | Stop reason: HOSPADM

## 2018-03-30 RX ORDER — ONDANSETRON 2 MG/ML
INJECTION INTRAMUSCULAR; INTRAVENOUS
Status: COMPLETED
Start: 2018-03-30 | End: 2018-03-30

## 2018-03-30 RX ORDER — GENTAMICIN SULFATE 80 MG/100ML
80 INJECTION, SOLUTION INTRAVENOUS ONCE
Status: COMPLETED | OUTPATIENT
Start: 2018-03-30 | End: 2018-03-30

## 2018-03-30 RX ADMIN — MIDAZOLAM HYDROCHLORIDE 2 MG: 1 INJECTION, SOLUTION INTRAMUSCULAR; INTRAVENOUS at 17:06

## 2018-03-30 RX ADMIN — ONDANSETRON 4 MG: 2 INJECTION INTRAMUSCULAR; INTRAVENOUS at 18:16

## 2018-03-30 RX ADMIN — GENTAMICIN SULFATE 80 MG: 80 INJECTION, SOLUTION INTRAVENOUS at 17:06

## 2018-03-30 RX ADMIN — FENTANYL CITRATE 25 MCG: 50 INJECTION, SOLUTION INTRAMUSCULAR; INTRAVENOUS at 17:49

## 2018-03-30 RX ADMIN — PROPOFOL 150 MG: 10 INJECTION, EMULSION INTRAVENOUS at 17:12

## 2018-03-30 RX ADMIN — GLYCOPYRROLATE 0.2 MG: 0.2 INJECTION INTRAMUSCULAR; INTRAVENOUS at 17:06

## 2018-03-30 RX ADMIN — SODIUM CHLORIDE, SODIUM LACTATE, POTASSIUM CHLORIDE, AND CALCIUM CHLORIDE: 600; 310; 30; 20 INJECTION, SOLUTION INTRAVENOUS at 16:58

## 2018-03-30 RX ADMIN — FENTANYL CITRATE 25 MCG: 50 INJECTION, SOLUTION INTRAMUSCULAR; INTRAVENOUS at 17:21

## 2018-03-30 RX ADMIN — LIDOCAINE HYDROCHLORIDE 60 MG: 20 INJECTION, SOLUTION EPIDURAL; INFILTRATION; INTRACAUDAL; PERINEURAL at 17:12

## 2018-03-30 RX ADMIN — FENTANYL CITRATE 50 MCG: 50 INJECTION, SOLUTION INTRAMUSCULAR; INTRAVENOUS at 17:45

## 2018-03-30 NOTE — ANESTHESIA PREPROCEDURE EVALUATION
Anesthetic History     PONV          Review of Systems / Medical History  Patient summary reviewed, nursing notes reviewed and pertinent labs reviewed    Pulmonary  Within defined limits                 Neuro/Psych         Psychiatric history     Cardiovascular    Hypertension: well controlled        Dysrhythmias       Exercise tolerance: >4 METS     GI/Hepatic/Renal     GERD: well controlled           Endo/Other      Hypothyroidism: well controlled       Other Findings              Physical Exam    Airway  Mallampati: II  TM Distance: 4 - 6 cm  Neck ROM: normal range of motion   Mouth opening: Normal     Cardiovascular  Regular rate and rhythm,  S1 and S2 normal,  no murmur, click, rub, or gallop  Rhythm: regular  Rate: normal         Dental  No notable dental hx       Pulmonary  Breath sounds clear to auscultation               Abdominal  GI exam deferred       Other Findings            Anesthetic Plan    ASA: 2  Anesthesia type: general          Induction: Intravenous  Anesthetic plan and risks discussed with: Patient and Spouse

## 2018-03-30 NOTE — BRIEF OP NOTE
BRIEF OPERATIVE NOTE    Date of Procedure: 3/30/2018   Preoperative Diagnosis: Ureteral stone [N20.1]  Postoperative Diagnosis: Ureteral stone [N20.1]    Procedure(s):  CYSTOSCOPY, RIGHT RETROGRADE PYELOGRAM WITH INTERPRETATION, RIGHT URETEROSCOPY LASER LITHOTRIPSY, STENT INSERTION  Surgeon(s) and Role:     * Gail Boyle MD - Primary         Assistant Staff: None      Surgical Staff:  Circ-1: Maris Melendez RN  Radiology Technician: Rodolfo Bowser  Scrub Tech-1: Raimundo Juarez  Scrub RN-1: Joey Watts RN  Event Time In   Incision Start 1721   Incision Close 1751     Anesthesia: General   Estimated Blood Loss: minimal  Specimens: None  Findings: 5mm stone noted. Complications: None  Implants:   Implant Name Type Inv.  Item Serial No.  Lot No. LRB No. Used Action   Racheal Dru [id number] [employer] [id number] -- Miguel Helm [facility] UROLOGY-[facility] [id number] Right 1 Implanted

## 2018-03-30 NOTE — H&P
Urology [address], [address] [facility], [facility]  phone: [phone]  fax: [phone]          Patient: Diamante Lamb  YOB: 1945  Date: 03/30/2018 11:30 AM   Visit Type: Office Visit      Completed Orders (this encounter)  Order Interpretation Result Next Lab Date   URINALYSIS, AUTO, W/O SCOPE see detail Test 1Color: yellow. Clarity: clear. Glucose: normal.  Bilirubin: 1 mg/dl. Ketones: negative. Specific Gravity: 1.025. Blood: 250. pH: 5.0. Protein: trace. Urobilinogen: 1 mg/dl. Nitrite: negative. Leukocytes: 25. Assessment/Plan  # Detail Type Description    1. Assessment Kidney stone (N20.0). Patient Plan I reviewed pt's imagings including CT and KUBs. The KUB today shows residual 2x5mm stone right distal ureter. She also has other stones in the right kidney. She is miserable with pain. She wants something done. Discussed w/ the patient regarding treatment. She needs cysto, rt rpg, right ureteroscopy laser litho, stent placement. I will treat the distal ureteral stone and if pt is doing ok, I will also treat the kidneys she has on the right side. She understands and wishes to proceed. All questions where answered. She will be scheduled for this evening at [facility]. She has hx of a fib on xeralto which she took yesterday night. She understands this increases risk fo bleeding. Since this is an urgent case, she wishes to proceed understanding the risk. Plan Orders Further diagnostic evaluations ordered today include(s) Abdominal/KUB 1 View to be performed. 2. Other Orders Orders not associated to today's assessments. Plan Orders The patient had the following test(s) completed today URINALYSIS, AUTO, W/O SCOPE. This 81year old  patient was referred by Jennifer Zhong. This 81year old female presents for Kidney and/or Ureteral Stone. History of Present Illness:  1.   Kidney and/or Ureteral Stone      Onset was 27 years ago. Severity level is moderate. It occurs intermittently. The problem is worse. Location of pain is right flank. The pain radiates to the groin. Prior stone type of unknown. Pertinent history includes history of prior stone(s) and past lithotripsy. The patient lists no relieving factors. Associated symptoms include pain. Pertinent negatives include chills, constipation, diarrhea, dysuria, fever, nausea, dribbling (urinary), frequency (urinary), urinary straining and vomiting. Additional information: She has had 12 stones in the past -- she has had several ESWL and ureteoscopies. US (10/31/17): 1cm LLP stone and a 5mm RLP stone. 3/16/18 - for the past 5 weeks she has had intermittent right flank pain and nausea. CT (3/14/18) - 4x6mm RLP stone and 8mm LLP stone and a 4x6mm right distal ureteral stone (645 HU). Worsening pain. KUB today shows 2 x 5mm stone likely fragments after rt URS likely causing her pain. Nai Boss Performed Test Interpretation Result   03/30/2018 Abdominal/KUB 1 View See module    03/30/2018 URINALYSIS, AUTO, W/O SCOPE see detail Test 1Color: yellow. Clarity: clear. Glucose: normal.  Bilirubin: 1 mg/dl. Ketones: negative. Specific Gravity: 1.025. Blood: 250. pH: 5.0. Protein: trace. Urobilinogen: 1 mg/dl. Nitrite: negative. Leukocytes: 25.              PAST MEDICAL/SURGICAL HISTORY   (Reviewed, updated)    Disease/disorder Onset Date Management Date Comments     D&C 2017      Hysterectomy, total 2017      trigger finger surgery 2005      Cholecystectomy 1996      Appendectomy 1980      Arthroscopy knee       cataract extraction and lens implantation       uterine suspension     Allergies       GERD       Hx Renal Calculi       Hyperlipidemia       KUB 9/12/13 ([facility])       Osteoarthritis       Post Menopausal       Urolithiasis         DIAGNOSTICS HISTORY:  Test Ordered Interpretation Result completed   COLONOSCOPY AND BIOPSY  abnormal See scanned report. 06/13/2013 * Dexa, Bone Density Scan  abnormal See scanned report. 10/22/2013   * X-RAY EXAM OF ABDOMEN KUB ADULT  abnormal See scanned report. 03/25/2015   COLONOSCOPY AND BIOPSY   See scanned report. 06/13/2013   ULTRASOUND OF ABDOMEN  normal See scanned report. 12/15/2015   ESOPHAGUS ENDOSCOPY, Biopsy Single Or Multiple  normal  01/04/2016   * Screening mammography digital  normal  03/19/2015   *Echocardiography Trans Complete  see detail EF 0.60 (60%). 09/16/2016   Echo/MUGA - Ejection Fraction 09/16/2016  EF 0.60 (60%) 09/18/2016   * Screening mammography digital   See scanned report. 01/17/2017     Test Ordered Ordering Comments Modifier   COLONOSCOPY AND BIOPSY      * Dexa, Bone Density Scan      * X-RAY EXAM OF ABDOMEN KUB ADULT  calculi LT    COLONOSCOPY AND BIOPSY  per Dr Mingo Shen 5 year recall    ULTRASOUND OF ABDOMEN      ESOPHAGUS ENDOSCOPY, Biopsy Single Or Multiple      * Screening mammography digital      *Echocardiography Trans Complete      Echo/MUGA - Ejection Fraction 09/16/2016     * Screening mammography digital          PROBLEM LIST:  Problem Description Onset Date   Hyperlipidemia 06/07/2011   Gastroesophageal reflux disease 06/07/2011   Anxiety disorder 06/07/2011   Disorder of bone and articular cartilage 06/07/2011   Insomnia 06/07/2011   Osteoporosis 05/27/2014   Arthropathy 05/27/2014   Neoplasm of uncertain behavior of skin 04/30/2012   No active problems    Medication Reconciliation  Medications reconciled today.   Medication Reviewed  Adherence Medication Name Sig Desc Elsewhere Status   taking as directed Centrum Silver Women 8 mg iron-400 mcg-300 mcg tablet take one tablet by oral route daily N Verified   taking as directed Vitamin D3 2,000 unit capsule take one capsule by oral route daily N Verified   taking as directed CALCIUM + VIT D otc Y Verified   taking as directed Flonase 50 mcg/actuation nasal spray,suspension spray1- 2 Spray(s) by Nasal route 1 time every day as needed N Verified taking as directed azelastine 0.15 % (205.5 mcg) nasal spray spray 1 spray by intranasal route 2 times every day in each nostril N Verified   taking as directed Bentyl 20 mg tablet take 1 tablet by oral route 3 times every day N Verified   taking as directed Align 4 mg capsule apply 1 by Oral route nce a day N Verified   taking as directed Probiotic 10 billion cell capsule take 1 tablet by oral route  every day N Verified   taking as directed potassium chloride ER 10 mEq capsule,extended release take 2 capsule by oral route  every day with food N Verified   taking as directed simvastatin 20 mg tablet take 1 tablet by oral route  every day in the evening N Verified   taking as directed Zyrtec 10 mg tablet take 1 tablet by ORAL route  every bedtime N Verified   taking as directed clonazepam 1 mg tablet take 1 tablet by oral route  every day N Verified   taking as directed Lunesta 2 mg tablet take 1 tablet (2MG)  by oral route  every day at bedtime N Verified   taking as directed Flomax 0.4 mg capsule take 1 capsule by oral route  every day 1/2 hour following the same meal each day N Verified   taking as directed Multaq 400 mg tablet take 1 tablet by oral route 2 times every day with morning and evening meals N Verified   taking as directed diltiazem  mg capsule,extended release 24 hr take 1 capsule by oral route  every day N Verified   taking as directed Xarelto 20 mg tablet take 1 tablet by oral route  every day with the evening meal N Verified   taking as directed butalbital-acetaminophen-caffeine 50 mg-300 mg-40 mg capsule take 1  capsule by oral route  every 4 hours as needed not to exceed 6 capsules per 24hrs N Verified   taking as directed valacyclovir 500 mg tablet take 2 tablets by oral route daily for 5 days as needed N Verified   taking as directed tramadol 50 mg tablet take 1 tablet by oral route 2 times every day as needed N Verified   taking as directed magnesium 250 mg tablet  Y Verified   taking as directed DEXILANT  CAPS 60MG TAKE 1 CAPSULE DAILY N Verified     Allergies  Ingredient Reaction Medication Name Comment   DENOSUMAB  Prolia    FORMALDEHYDE      GOLD SODIUM THIOMALATE      DOXYCYCLINE hives     OXYCODONE HCL  PERCOCET    ACETAMINOPHEN  PERCOCET    TETRACYCLINE Hives/Skin Rash     SULFANILAMIDE      AMOXICILLIN TRIHYDRATE   AUGMENTIN   CIPROFLOXACIN      POTASSIUM CLAVULANATE   AUGMENTIN   PENICILLIN V      CEPHALEXIN MONOHYDRATE   Keflex   ACETAMINOPHEN   Tylenol-Codeine   ASPIRIN      HYDROCODONE BITARTRATE   Vicodin   ERYTHROMYCIN BASE      MEPERIDINE HCL   Demerol   ACETAMINOPHEN   Vicodin   CODEINE PHOSPHATE   Tylenol-Codeine     (Reviewed, no changes.)  Family History:  (Reviewed, updated)  Relationship Family Member Name  Age at Death Condition Onset Age Cause of Death       Family history of Thyroid disorder  N   Father  Y    N   Father    Hypertension  N   Father  Y  Cancer, lung  Y   Mother    Heart disease  N   Mother    Alzheimer's Disease  N   Mother    Thyroid disorder  N   Mother    Congestive heart failure  N   Sister    Osteoarthritis  N   Sister    Cancer, lung  N   Sister    Crohn's disease  N   Sister    Hyperthyroidism  N   Sister    Colon polyps  N   Sister    Cancer  N   Sister    Thyroid disorder  N   Sister    Depression  N         Social History:  (Reviewed, updated)  Tobacco use reviewed. The patient is right-handed. Preferred language is [place]. MARITAL STATUS/FAMILY/SOCIAL SUPPORT  Currently . Tobacco use status: Never smoked tobacco.  Smoking status: Never smoker. SMOKING STATUS  Use Status Type Smoking Status Usage Per Day Years Used Total Pack Years   no/never  Never smoker          No passive smoke exposure. ALCOHOL  There is no history of alcohol use. CAFFEINE  The patient uses caffeine: tea and soda. - 3 cups a day. LIFESTYLE  light activity level. Never exercises. DIET  healthy.   Jainism/SPIRITUAL  Patient agrees to transfusion. Advance Directives:  STATUS  Last reviewed on:     DIRECTIVES  Transfusion: Patient agrees to transfusion. Review of Systems  System Neg/Pos Details   Constitutional Positive Pain. Constitutional Negative Chills and fever. ENMT Negative Ear infections and sore throat. Eyes Negative Blurred vision, double vision and eye pain. Respiratory Negative Asthma, chronic cough, dyspnea and wheezing. Cardio Negative Chest pain. GI Negative Constipation, decreased appetite, diarrhea, nausea and vomiting.  Negative Dysuria, urinary dribbling, urinary frequency and urinary straining. Endocrine Negative Cold intolerance, heat intolerance, increased thirst and weight loss. Neuro Negative Headache and tremors. Psych Negative Anxiety and depression. Integumentary Negative Itching skin and rash. MS Negative Back pain and joint pain. Hema/Lymph Negative Easy bleeding. Physical Exam  Exam Findings Details   Constitutional Normal Well developed. Genitourinary Normal No Suprapubic tenderness. No CVA tenderness. Musculoskeletal Normal Visual overview of all four extremities is normal. Gait - Normal.   Neurological Normal Level of consciousness - Normal. Orientation - Normal. Memory - Normal.   Psychiatric Normal Oriented to time, place, person and situation. Appropriate mood and affect. Procedures:  Uroflow:  Feeling of incomplete bladder emptying R39.14. Procedure:   Sonographic residual urine: 69mL. Time after void: 2 Minutes. Comments:. Physician: Brian Martínez MD. Date: 03/30/2018. Time: 12:36 PM.  Post procedure: Instructions: Benna Him       Medications (added, continued, or stopped today)  Started Medication Directions Instruction Stopped   09/01/2017 Align 4 mg capsule apply 1 by Oral route nce a day     05/24/2017 azelastine 0.15 % (205.5 mcg) nasal spray spray 1 spray by intranasal route 2 times every day in each nostril     05/24/2017 Bentyl 20 mg tablet take 1 tablet by oral route 3 times every day     03/12/2018 butalbital-acetaminophen-caffeine 50 mg-300 mg-40 mg capsule take 1  capsule by oral route  every 4 hours as needed not to exceed 6 capsules per 24hrs      CALCIUM + VIT D otc     09/11/2016 Centrum Silver Women 8 mg iron-400 mcg-300 mcg tablet take one tablet by oral route daily     02/06/2018 clonazepam 1 mg tablet take 1 tablet by oral route  every day     03/16/2018 DEXILANT DR CAPS 60MG TAKE 1 CAPSULE DAILY     03/12/2018 diltiazem  mg capsule,extended release 24 hr take 1 capsule by oral route  every day     02/14/2018 Flomax 0.4 mg capsule take 1 capsule by oral route  every day 1/2 hour following the same meal each day     10/19/2016 Flonase 50 mcg/actuation nasal spray,suspension spray1- 2 Spray(s) by Nasal route 1 time every day as needed     02/07/2018 Lunesta 2 mg tablet take 1 tablet (2MG)  by oral route  every day at bedtime      magnesium 250 mg tablet      03/12/2018 Multaq 400 mg tablet take 1 tablet by oral route 2 times every day with morning and evening meals     10/18/2017 potassium chloride ER 10 mEq capsule,extended release take 2 capsule by oral route  every day with food     09/06/2017 Probiotic 10 billion cell capsule take 1 tablet by oral route  every day     11/02/2017 simvastatin 20 mg tablet take 1 tablet by oral route  every day in the evening     03/12/2018 tramadol 50 mg tablet take 1 tablet by oral route 2 times every day as needed     03/12/2018 valacyclovir 500 mg tablet take 2 tablets by oral route daily for 5 days as needed  02/13/2020 09/11/2016 Vitamin D3 2,000 unit capsule take one capsule by oral route daily     03/12/2018 Xarelto 20 mg tablet take 1 tablet by oral route  every day with the evening meal     11/02/2017 Zyrtec 10 mg tablet take 1 tablet by ORAL route  every bedtime     Completed by:         Gail Boyle  03/30/2018 2:58 PM   Document generated by:  Daniel Boyle 03/30/2018 02:58 PM  CC Providers:  Abdi Hernandez [address] [place], [address]  [phone]

## 2018-03-30 NOTE — DISCHARGE INSTRUCTIONS
[address] [facility], Urology     [facility], [address] [facility], [address] [place] [address]  Office: [phone]  Fax:    [phone], right ureteroscopy laser litho, stent placement  __  Notify [facility] Urology IMMEDIATELY if any of the following occur:     You are unable to urinate. Urgency to urinate is not uncommon.   You find yourself urinating small frequent amounts associated with severe lower abdominal discomfort.   Bright red blood with clots in the urine. Some reddish urine is not uncommon and should be treated with increasing the amount of fluids you drink.   Temperature above 101.5° and / or chills.   You are nauseous and / or vomiting and you cannot hold down any fluids.   Your pain is not controlled with the pain medication prescribed. Special Considerations:      Do not drive for at least 24 hours after the procedure and until you are no longer taking narcotic pain medication and you are able to move and react without hesitation.  _x_  No heavy lifting over 20 pounds & no strenuous exercise for 1 week      _x_  Our office will call you to make an appointment in 1 week for stent removal.    Please contact [facility]. Urology at [phone] or go to the nearest Emergency Department / Urgent Care facility for any other medical questions or concerns. DISCHARGE SUMMARY from Nurse    PATIENT INSTRUCTIONS:    After general anesthesia or intravenous sedation, for 24 hours or while taking prescription Narcotics:  · Limit your activities  · Do not drive and operate hazardous machinery  · Do not make important personal or business decisions  · Do  not drink alcoholic beverages  · If you have not urinated within 8 hours after discharge, please contact your surgeon on call.     Report the following to your surgeon:  · Excessive pain, swelling, redness or odor of or around the surgical area  · Temperature over 100.5  · Nausea and vomiting lasting longer than 4 hours or if unable to take medications  · Any signs of decreased circulation or nerve impairment to extremity: change in color, persistent  numbness, tingling, coldness or increase pain  · Any questions    What to do at Home:  Recommended activity: Activity as tolerated,     If you experience any of the following symptoms above, please follow up with Dr. Akila Arguello. *  Please give a list of your current medications to your Primary Care Provider. *  Please update this list whenever your medications are discontinued, doses are      changed, or new medications (including over-the-counter products) are added. *  Please carry medication information at all times in case of emergency situations. These are general instructions for a healthy lifestyle:    No smoking/ No tobacco products/ Avoid exposure to second hand smoke  Surgeon General's Warning:  Quitting smoking now greatly reduces serious risk to your health. Obesity, smoking, and sedentary lifestyle greatly increases your risk for illness    A healthy diet, regular physical exercise & weight monitoring are important for maintaining a healthy lifestyle    You may be retaining fluid if you have a history of heart failure or if you experience any of the following symptoms:  Weight gain of 3 pounds or more overnight or 5 pounds in a week, increased swelling in our hands or feet or shortness of breath while lying flat in bed. Please call your doctor as soon as you notice any of these symptoms; do not wait until your next office visit. Recognize signs and symptoms of STROKE:    F-face looks uneven    A-arms unable to move or move unevenly    S-speech slurred or non-existent    T-time-call 911 as soon as signs and symptoms begin-DO NOT go       Back to bed or wait to see if you get better-TIME IS BRAIN. Warning Signs of HEART ATTACK     Call 911 if you have these symptoms:   Chest discomfort.  Most heart attacks involve discomfort in the center of the chest that lasts more than a few minutes, or that goes away and comes back. It can feel like uncomfortable pressure, squeezing, fullness, or pain.  Discomfort in other areas of the upper body. Symptoms can include pain or discomfort in one or both arms, the back, neck, jaw, or stomach.  Shortness of breath with or without chest discomfort.  Other signs may include breaking out in a cold sweat, nausea, or lightheadedness. Don't wait more than five minutes to call 911 - MINUTES MATTER! Fast action can save your life. Calling 911 is almost always the fastest way to get lifesaving treatment. Emergency Medical Services staff can begin treatment when they arrive -- up to an hour sooner than if someone gets to the hospital by car. Patient armband removed and shredded    The discharge information has been reviewed with the patient and caregiver. The patient and caregiver verbalized understanding. Discharge medications reviewed with the patient and caregiver and appropriate educational materials and side effects teaching were provided.   ___________________________________________________________________________________________________________________________________

## 2018-03-30 NOTE — IP AVS SNAPSHOT
[facility] 
 
 
 [address] [address] 
[phone] Patient: Robin Danielson MRN: [id number] :1945 About your hospitalization You were admitted on:  2018 You last received care in the:  [facility] PACU You were discharged on:  2018 Why you were hospitalized Your primary diagnosis was:  Not on File Follow-up Information Follow up With Details Comments Contact Info Merita Cockayne, MD   [address] [place] [address] [address] [address] 
[phone] Giorgi Barnard MD  office will call for follow up appointment [address] [address] 
[phone] Your Scheduled Appointments Monday May 21, 2018  8:45 AM EDT  
ESTABLISHED PATIENT with Nia Jacobsen MD  
[facility] ([facility]) [address] [place] [address]  
[phone] Discharge Orders None A check amanda indicates which time of day the medication should be taken. My Medications CHANGE how you take these medications Instructions Each Dose to Equal  
 Morning Noon Evening Bedtime * traMADol 50 mg tablet Commonly known as:  ULTRAM  
What changed:  Another medication with the same name was added. Make sure you understand how and when to take each. Your last dose was: Your next dose is: Take 1 Tab by mouth two (2) times daily as needed for Pain. Max Daily Amount: 100 mg.  
 50 mg  
    
   
   
   
  
 * traMADol 50 mg tablet Commonly known as:  ULTRAM  
What changed: You were already taking a medication with the same name, and this prescription was added. Make sure you understand how and when to take each. Your last dose was: Your next dose is: Take 1 Tab by mouth every six (6) hours as needed for Pain.  Max Daily Amount: 200 mg.  
 50 mg  
    
   
   
   
  
 * Notice: This list has 2 medication(s) that are the same as other medications prescribed for you. Read the directions carefully, and ask your doctor or other care provider to review them with you. CONTINUE taking these medications Instructions Each Dose to Equal  
 Morning Noon Evening Bedtime ALIGN 4 mg Cap Generic drug:  Bifidobacterium Infantis Your last dose was: Your next dose is: Take 1 Cap by mouth daily. 1 Cap  
    
   
   
   
  
 aspirin delayed-release 81 mg tablet Your last dose was: Your next dose is: Take  by mouth daily. Azelastine 0.15 % (205.5 mcg) nasal spray Commonly known as:  ASTEPRO Your last dose was: Your next dose is:    
   
   
 1 Spray by Both Nostrils route two (2) times a day. 1 Spray BENTYL 20 mg tablet Generic drug:  dicyclomine Your last dose was: Your next dose is: Take 20 mg by mouth three (3) times daily. 20 mg CALCIUM + D PO Your last dose was: Your next dose is: Take 1 Tab by mouth daily. 1 Tab CENTRUM SILVER ULTRA WOMEN'S PO Your last dose was: Your next dose is: Take 1 Tab by mouth daily. 1 Tab Cetirizine 10 mg Cap Your last dose was: Your next dose is: Take 10 mg by mouth nightly. 10 mg DEXILANT 30 mg capsule Generic drug:  dexlansoprazole Your last dose was: Your next dose is: Take 60 mg by mouth daily. 60 mg  
    
   
   
   
  
 dilTIAZem  mg Tb24 tablet Commonly known as:  CARDIZEM LA Your last dose was: Your next dose is: Take 240 mg by mouth daily. 240 mg  
    
   
   
   
  
 famotidine 20 mg tablet Commonly known as:  PEPCID  
   
 Your last dose was: Your next dose is: Take 20 mg by mouth daily. 20 mg  
    
   
   
   
  
 FIORINAL capsule Generic drug:  butalbital-aspirin-caffeine Your last dose was: Your next dose is: Take 1 Cap by mouth every four (4) hours as needed for Pain. 1 Cap FLOMAX 0.4 mg capsule Generic drug:  tamsulosin Your last dose was: Your next dose is: Take 0.4 mg by mouth daily. 0.4 mg  
    
   
   
   
  
 FLONASE ALLERGY RELIEF 50 mcg/actuation nasal spray Generic drug:  fluticasone Your last dose was: Your next dose is: 2 Sprays by Both Nostrils route daily as needed for Rhinitis. 2 Spray KlonoPIN 1 mg tablet Generic drug:  clonazePAM  
   
Your last dose was: Your next dose is: Take 1 mg by mouth daily. Indications: PANIC DISORDER  
 1 mg LUNESTA 2 mg tablet Generic drug:  eszopiclone Your last dose was: Your next dose is: Take 2 mg by mouth nightly. 2 mg MULTAQ Tab tablet Generic drug:  dronedarone Your last dose was: Your next dose is: Take 400 mg by mouth two (2) times daily (with meals). 400 mg  
    
   
   
   
  
 ondansetron 4 mg disintegrating tablet Commonly known as:  ZOFRAN ODT Your last dose was: Your next dose is: Take 2 Tabs by mouth every eight (8) hours as needed for Nausea. 8 mg  
    
   
   
   
  
 potassium chloride SR 10 mEq tablet Commonly known as:  KLOR-CON 10 Your last dose was: Your next dose is: Take 20 mEq by mouth daily. 20 mEq  
    
   
   
   
  
 triamcinolone acetonide 0.1 % topical cream  
Commonly known as:  KENALOG Your last dose was: Your next dose is: Apply  to affected area two (2) times a day. use thin layer  
     
   
   
   
  
 valACYclovir 500 mg tablet Commonly known as:  VALTREX Your last dose was: Your next dose is: Take 500 mg by mouth two (2) times daily as needed. 500 mg  
    
   
   
   
  
 VITAMIN D3 1,000 unit tablet Generic drug:  cholecalciferol Your last dose was: Your next dose is: Take 2,000 Units by mouth daily. 2000 Units XARELTO 20 mg Tab tablet Generic drug:  rivaroxaban Your last dose was: Your next dose is: Take 20 mg by mouth daily (with dinner). 20 mg  
    
   
   
   
  
 ZOCOR 40 mg tablet Generic drug:  simvastatin Your last dose was: Your next dose is: Take 20 mg by mouth nightly. 20 mg Where to Get Your Medications Information on where to get these meds will be given to you by the nurse or doctor. ! Ask your nurse or doctor about these medications  
  traMADol 50 mg tablet Opioid Education Prescription Opioids: What You Need to Know: 
 
Prescription opioids can be used to help relieve moderate-to-severe pain and are often prescribed following a surgery or injury, or for certain health conditions. These medications can be an important part of treatment but also come with serious risks. Opioids are strong pain medicines. Examples include hydrocodone, oxycodone, fentanyl, and morphine. Heroin is an example of an illegal opioid. It is important to work with your health care provider to make sure you are getting the safest, most effective care. WHAT ARE THE RISKS AND SIDE EFFECTS OF OPIOID USE? Prescription opioids carry serious risks of addiction and overdose, especially with prolonged use. An opioid overdose, often marked by slow breathing, can cause sudden death.   The use of prescription opioids can have a number of side effects as well, even when taken as directed. · Tolerance-meaning you might need to take more of a medication for the same pain relief · Physical dependence-meaning you have symptoms of withdrawal when the medication is stopped. Withdrawal symptoms can include nausea, sweating, chills, diarrhea, stomach cramps, and muscle aches. Withdrawal can last up to several weeks, depending on which drug you took and how long you took it. · Increased sensitivity to pain · Constipation · Nausea, vomiting, and dry mouth · Sleepiness and dizziness · Confusion · Depression · Low levels of testosterone that can result in lower sex drive, energy, and strength · Itching and sweating RISKS ARE GREATER WITH:      
· History of drug misuse, substance use disorder, or overdose · Mental health conditions (such as depression or anxiety) · Sleep apnea · Older age (72 years or older) · Pregnancy Avoid alcohol while taking prescription opioids. Also, unless specifically advised by your health care provider, medications to avoid include: · Benzodiazepines (such as Xanax or Valium) · Muscle relaxants (such as Soma or Flexeril) · Hypnotics (such as Ambien or Lunesta) · Other prescription opioids KNOW YOUR OPTIONS Talk to your health care provider about ways to manage your pain that don't involve prescription opioids. Some of these options may actually work better and have fewer risks and side effects. Options may include: 
· Pain relievers such as acetaminophen, ibuprofen, and naproxen · Some medications that are also used for depression or seizures · Physical therapy and exercise · Counseling to help patients learn how to cope better with triggers of pain and stress. · Application of heat or cold compress · Massage therapy · Relaxation techniques Be Informed Make sure you know the name of your medication, how much and how often to take it, and its potential risks & side effects. IF YOU ARE PRESCRIBED OPIOIDS FOR PAIN: 
· Never take opioids in greater amounts or more often than prescribed. Remember the goal is not to be pain-free but to manage your pain at a tolerable level. · Follow up with your primary care provider to: · Work together to create a plan on how to manage your pain. · Talk about ways to help manage your pain that don't involve prescription opioids. · Talk about any and all concerns and side effects. · Help prevent misuse and abuse. · Never sell or share prescription opioids · Help prevent misuse and abuse. · Store prescription opioids in a secure place and out of reach of others (this may include visitors, children, friends, and family). · Safely dispose of unused/unwanted prescription opioids: Find your community drug take-back program or your pharmacy mail-back program, or flush them down the toilet, following guidance from the Food and Drug Administration (www.fda.gov/Drugs/ResourcesForYou). · Visit www.cdc.gov/drugoverdose to learn about the risks of opioid abuse and overdose. · If you believe you may be struggling with addiction, tell your health care provider and ask for guidance or call [address] [place] [address] at [phone]. Discharge Instructions [address] [place] [address], [facility], [address] [place] [address], [address] [place] [address] Office: [phone] Fax:    [phone] PROCEDURE Cysto, right ureteroscopy laser litho, stent placement 
__ Notify [facility] IMMEDIATELY if any of the following occur: ? You are unable to urinate. Urgency to urinate is not uncommon. ? You find yourself urinating small frequent amounts associated with severe lower abdominal discomfort. ?  Bright red blood with clots in the urine.  Some reddish urine is not uncommon and should be treated with increasing the amount of fluids you drink. ? Temperature above 101.5° and / or chills. ?  You are nauseous and / or vomiting and you cannot hold down any fluids. ?  Your pain is not controlled with the pain medication prescribed. Special Considerations:  
 
? Do not drive for at least 24 hours after the procedure and until you are no longer taking narcotic pain medication and you are able to move and react without hesitation. ? _x_  No heavy lifting over 20 pounds & no strenuous exercise for 1 week ? _x_  Our office will call you to make an appointment in 1 week for stent removal. 
 
Please contact [facility]. Urology at [phone] or go to the nearest Emergency Department / Urgent Care facility for any other medical questions or concerns. DISCHARGE SUMMARY from Nurse PATIENT INSTRUCTIONS: 
 
After general anesthesia or intravenous sedation, for 24 hours or while taking prescription Narcotics: · Limit your activities · Do not drive and operate hazardous machinery · Do not make important personal or business decisions · Do  not drink alcoholic beverages · If you have not urinated within 8 hours after discharge, please contact your surgeon on call. Report the following to your surgeon: 
· Excessive pain, swelling, redness or odor of or around the surgical area · Temperature over 100.5 · Nausea and vomiting lasting longer than 4 hours or if unable to take medications · Any signs of decreased circulation or nerve impairment to extremity: change in color, persistent  numbness, tingling, coldness or increase pain · Any questions What to do at Home: 
Recommended activity: Activity as tolerated, If you experience any of the following symptoms above, please follow up with Dr. Dina Terrazas. *  Please give a list of your current medications to your Primary Care Provider.  
 
*  Please update this list whenever your medications are discontinued, doses are 
 changed, or new medications (including over-the-counter products) are added. *  Please carry medication information at all times in case of emergency situations. These are general instructions for a healthy lifestyle: No smoking/ No tobacco products/ Avoid exposure to second hand smoke Surgeon General's Warning:  Quitting smoking now greatly reduces serious risk to your health. Obesity, smoking, and sedentary lifestyle greatly increases your risk for illness A healthy diet, regular physical exercise & weight monitoring are important for maintaining a healthy lifestyle You may be retaining fluid if you have a history of heart failure or if you experience any of the following symptoms:  Weight gain of 3 pounds or more overnight or 5 pounds in a week, increased swelling in our hands or feet or shortness of breath while lying flat in bed. Please call your doctor as soon as you notice any of these symptoms; do not wait until your next office visit. Recognize signs and symptoms of STROKE: 
 
F-face looks uneven A-arms unable to move or move unevenly S-speech slurred or non-existent T-time-call 911 as soon as signs and symptoms begin-DO NOT go Back to bed or wait to see if you get better-TIME IS BRAIN. Warning Signs of HEART ATTACK Call 911 if you have these symptoms: 
? Chest discomfort. Most heart attacks involve discomfort in the center of the chest that lasts more than a few minutes, or that goes away and comes back. It can feel like uncomfortable pressure, squeezing, fullness, or pain. ? Discomfort in other areas of the upper body. Symptoms can include pain or discomfort in one or both arms, the back, neck, jaw, or stomach. ? Shortness of breath with or without chest discomfort. ? Other signs may include breaking out in a cold sweat, nausea, or lightheadedness. Don't wait more than five minutes to call [address]!  Fast action can save your life. Calling 911 is almost always the fastest way to get lifesaving treatment. Emergency Medical Services staff can begin treatment when they arrive  up to an hour sooner than if someone gets to the hospital by car. Patient armband removed and shredded The discharge information has been reviewed with the patient and caregiver. The patient and caregiver verbalized understanding. Discharge medications reviewed with the patient and caregiver and appropriate educational materials and side effects teaching were provided. ___________________________________________________________________________________________________________________________________ Introducing [address]! [facility] introduces SocialSmack patient portal. Now you can access parts of your medical record, email your doctor's office, and request medication refills online. 1. In your internet browser, go to [email or web address] 2. Click on the First Time User? Click Here link in the Sign In box. You will see the New Member Sign Up page. 3. Enter your SocialSmack Access Code exactly as it appears below. You will not need to use this code after youve completed the sign-up process. If you do not sign up before the expiration date, you must request a new code. · SocialSmack Access Code: [email or web address] Expires: 6/14/2018 12:03 PM 
 
4. Enter the last four digits of your Social Security Number (xxxx) and Date of Birth (mm/dd/yyyy) as indicated and click Submit. You will be taken to the next sign-up page. 5. Create a groSolart ID. This will be your SocialSmack login ID and cannot be changed, so think of one that is secure and easy to remember. 6. Create a SocialSmack password. You can change your password at any time. 7. Enter your Password Reset Question and Answer. This can be used at a later time if you forget your password. 8. Enter your e-mail address.  You will receive e-mail notification when new information is available in Ellacoya Networkst. 9. Click Sign Up. You can now view and download portions of your medical record. 10. Click the Download Summary menu link to download a portable copy of your medical information. If you have questions, please visit the Frequently Asked Questions section of the Ell[employer] Networkst website. Remember, RoyaltyShare is NOT to be used for urgent needs. For medical emergencies, dial 911. Now available from your iPhone and Android! Introducing Tra Cardoso As a [email or web address] [facility] patient, I wanted to make you aware of our electronic visit tool called Tra Cardoso. Crowdbaron 24/7 allows you to connect within minutes with a medical provider 24 hours a day, seven days a week via a mobile device or tablet or logging into a secure website from your computer. You can access Tra Cardoso from anywhere in the [place]. A virtual visit might be right for you when you have a simple condition and feel like you just dont want to get out of bed, or cant get away from work for an appointment, when your regular [facility] [employer] [facility] provider is not available (evenings, weekends or holidays), or when youre out of town and need minor care. Electronic visits cost only $49 and if the KimGridAnts 24/7 provider determines a prescription is needed to treat your condition, one can be electronically transmitted to a nearby pharmacy*. Please take a moment to enroll today if you have not already done so. The enrollment process is free and takes just a few minutes. To enroll, please download the Crowdbaron 24/7 veronique to your tablet or phone, or visit [email or web address] to enroll on your computer. And, as an [address] [place] [address] patient with a [employer] account, the results of your visits will be scanned into your electronic medical record and your primary care provider will be able to view the scanned results. We urge you to continue to see your regular [facility] provider for your ongoing medical care. And while your primary care provider may not be the one available when you seek a [place] DIGITAL MEDIAgumarofin virtual visit, the peace of mind you get from getting a real diagnosis real time can be priceless. For more information on Firstmonie, view our Frequently Asked Questions (FAQs) at [email or web address]. Sincerely, 
 
Veena Ngo MD 
Chief Medical Officer [address] *:  certain medications cannot be prescribed via Firstmonie Unresulted Labs-Please follow up with your PCP about these lab tests Order Current Status XR RETROGRADE PYELOGRAM In process Providers Seen During Your Hospitalization Provider Specialty Primary office phone Sanchez Fuller MD Urology [phone] Your Primary Care Physician (PCP) Primary Care Physician Office Phone Office Fax [address] [address] [phone] You are allergic to the following Allergen Reactions Acetaminophen Unable to Obtain Acetaminophen-Codeine Other (comments) Amoxicillin Hives Aspirin Hives Augmentin (Amoxicillin-Pot Clavulanate) Other (comments) Cephalexin Hives Nausea and Vomiting Ciprofloxacin Other (comments) GI distress Clavulanic Acid Unknown (comments) Codeine Hives Doxycycline Hives Eryc (Erythromycin) Other (comments) Gi ditress Formaldehyde Unknown (comments) Gold Sodium Thiomalate Unknown (comments) Hydrocodone Unknown (comments) Hydrocodone-Acetaminophen Rash Other reaction(s): mild rash/itching Meperidine Hives Neomycin Other (comments) Nsaids (Non-Steroidal Anti-Inflammatory Drug) Unknown (comments) Oxycodone Unknown (comments) Penicillin G Hives Penicillin G Potassium In D5w Unknown (comments) Percocet (Oxycodone-Acetaminophen) Other (comments) Other reaction(s): gi distress GI Potassium Unknown (comments) Prolia (Denosumab) Hives Unknown (comments) Resorcinol-Calamine-Zinc Oxide Other (comments) Sulfa (Sulfonamide Antibiotics) Hives Sulfanilamide Unknown (comments) Tetracycline Hives Rash Recent Documentation OB Status Smoking Status Postmenopausal Never Smoker Emergency Contacts Name Discharge Info Relation Home Work Mobile SamuelVicente DORON DISCHARGE CAREGIVER [3] Spouse [3] [phone] Patient Belongings The following personal items are in your possession at time of discharge: 
  Dental Appliances: None         Home Medications: None   Jewelry: None  Clothing: Footwear, Socks, Pants, Undergarments, Shirt    Other Valuables: Cell Phone, Other (comment) (with drew) Please provide this summary of care documentation to your next provider. Signatures-by signing, you are acknowledging that this After Visit Summary has been reviewed with you and you have received a copy. Patient Signature:  ____________________________________________________________ Date:  ____________________________________________________________  
  
Joo Pickard Provider Signature:  ____________________________________________________________ Date:  ____________________________________________________________

## 2018-03-30 NOTE — ANESTHESIA POSTPROCEDURE EVALUATION
Post-Anesthesia Evaluation and Assessment    Cardiovascular Function/Vital Signs  Visit Vitals    /68    Pulse 75    Temp 36.8 °C (98.2 °F)    Resp 17    SpO2 99%       Patient is status post Procedure(s):  CYSTOSCOPY, URETEROSCOPY, RIGHT RETROGRADE PYELOGRAM WITH INTERPRETATION, LASER LITHOTRIPSY, STENT INSERTION. Nausea/Vomiting: Controlled. Postoperative hydration reviewed and adequate. Pain:  Pain Scale 1: Numeric (0 - 10) (03/30/18 1808)  Pain Intensity 1: 0 (03/30/18 1808)   Managed. Neurological Status:   Neuro (WDL): Within Defined Limits (03/30/18 1551)   At baseline. Mental Status and Level of Consciousness: Arousable. Pulmonary Status:   O2 Device: Room air (03/30/18 1808)   Adequate oxygenation and airway patent. Complications related to anesthesia: None    Post-anesthesia assessment completed. No concerns. Patient has met all discharge requirements.     Signed By: Faith Coley CRNA    March 30, 2018

## 2018-03-31 NOTE — OP NOTES
[address] REPORT    Gearl Coil  MR#: [id number]  : 1945  ACCOUNT #: [de-identified]   DATE OF SERVICE: 2018    PREOPERATIVE DIAGNOSIS:  Right ureteral stone. POSTOPERATIVE DIAGNOSIS:  Right ureteral stone. PROCEDURES:  1. Cystoscopy, right retrograde pyelogram with interpretation. 2.  Right ureteroscopy, laser lithotripsy. 3.  Right stent insertion with C-arm. ATTENDING SURGEON:  Gail oByle MD     ASSISTANT:      ANESTHESIA:  General.    INTRAVENOUS FLUIDS:  Per Anesthesia. ESTIMATED BLOOD LOSS:  Minimal.    SPECIMEN:  None. IMPLANTS:     FINDINGS:  An approximately 5 mm stone noted on retrograde pyelogram at the level of the pelvic brim and acetabulum on the right side. The stone was pushed up into the kidney during the insertion of wire, and subsequently treated within the kidney in the lower pole. COMPLICATIONS:  None. DRAINS:  A 6 British x 26 cm double-J stent to the right collecting system. OPERATIVE INDICATION:   This is a 82-year-old female with a history of nephrolithiasis who has undergone multiple treatments for a stone in the distal ureter, starting with right ESWL, followed by ureteroscopy by Dr. Kentrell Arteaga. Patient presented today with severe pain. KUB confirmed a residual stone in the distal ureter at the level of the right acetabulum and pelvic inlet. Patient presented for endoscopic management and treatment of the stone as stated above. The risks, benefits, and the alternatives to the procedure were explained to the patient. She understood and signed a consent for the procedure. She received gentamicin 80 mg for preoperative antibiotics. OPERATIVE DESCRIPTION:  The patient was brought into the operating room, placed supine on the operating table. A timeout was called. After appropriate induction with general anesthesia, she was placed in lithotomy position, prepped and draped in the usual sterile fashion.   The meatus was intubated with a #21 Czech rigid cystoscope with a 30-degree lens. Inspection of the bladder was normal.  Attention was then turned to the right ureteral orifice and cannulated with an open-ended catheter, and a gentle retrograde pyelogram was performed, which shows a filling defect noted at the location stated above. A sensor wire was then introduced and taken out to the level of the kidney. A semirigid ureteroscope was then introduced from the UO, and inspection of the ureter was then performed from the UO up towards the proximal ureter. No stone was visualized. The semirigid was then looked back very carefully; no other stone again was noted. At this point, I elected to place a second wire through the ureteroscope, and the  flexible ureteroscope was then backloaded onto one of the wires and then taken up to the kidney. At this point, I inspected all the caliceal system. The only stone that I found was one in the lower pole, which was about 5 mm in size. This was consistent with the size and appearance of the stone noted on the KUB. The stone was then treated with a 272-micron laser fiber to punctate size. At this point, we repeated the pan pyeloscopy, and no other stones were noted. This flexible ureteroscope was then taken back out very slowly, inspecting every aspect of the ureter, and no other stone was noted. The scope was then removed. Bladder was emptied. The patient tolerated the procedure very well. No complications were noted. The patient was then awoken and transferred to the recovery room in stable condition without any difficulties.       MD Joe Jeffers  D: 03/30/2018 18:13     T: 03/31/2018 10:21  JOB #: [id number]

## 2018-04-04 LAB
CA PHOS MFR STONE: 35 %
CALCIUM OXALATE DIHYDRATE MFR STONE IR: 25 %
COLOR STONE: NORMAL
COM MFR STONE: 40 %
COMMENT, 519994: NORMAL
COMPOSITION, 120440: NORMAL
DISCLAIMER, STO32L: NORMAL
NIDUS STONE QL: NORMAL
SIZE STONE: NORMAL MM
SURFACE CRYSTALS, 120439: NORMAL
WT STONE: 9 MG

## 2018-04-09 NOTE — OP NOTES
[address] REPORT    Eliazar Flanagan  MR#: [id number]  : 1945  ACCOUNT #: [de-identified]   DATE OF SERVICE: 2018    PREOPERATIVE DIAGNOSIS:  Hydronephrosis with ureteral calculus obstruction. POSTOPERATIVE DIAGNOSIS:  Hydronephrosis with ureteral calculus obstruction. PROCEDURES PERFORMED:  Cystoscopy, left retrograde pyelogram with interpretation, right ureteroscopy, laser lithotripsy and stent. SURGEON:  Andrzej Dominguez MD    ASSISTANT:  None. ANESTHESIA:  General.    ESTIMATED BLOOD LOSS:  None. SPECIMENS:  Right ureteral stone. FINDINGS:  The patient had a stone that was very easily fragmented and extracted. COMPLICATIONS:  None. IMPLANT:  4.8 Armenian VL stent on a string. DISPOSITION:  The patient was taken to recovery in stable condition. CLINICAL NOTE:  The patient is a 68-year-old female with a history of stone. She underwent right ESWL procedure. She had minimal fragmentation. She was having continued pain and was found to have a continued stone in the distal ureter. She presents for ureteroscopy. OPERATIVE REPORT:  Preoperatively, risks and benefits of surgery were described to the patient. The risks included but were not limited to bleeding, infection, injury to the bladder, injury to the ureter and kidney, possible need for more than 1 procedure. The patient assumed the risks and signed informed consent. The patient was taken to the operating room and placed on the operating table in supine position. She was administered general anesthetic. She was administered intravenous antibiotics. She was placed in dorsal lithotomy position, prepped and draped in the usual sterile manner. A 21-Armenian cystoscope sheath was then inserted transurethrally atraumatically under direct vision using 30 degree lens. Panendoscopy was done. Bilateral ureteral orifices were normal and anatomic in position.   There were no stones, no tumors, no areas of concern. The right ureteral orifice was identified. It was cannulated with a 5 [place] open ureteral catheter. Retrograde pyelogram was done as usual.  There was noted a filling defect in the distal ureter consistent with stone. Proximal to this, there was mild to moderate hydroureteronephrosis. There is no blunting of calices. No other filling defects were seen along the length of the ureter or up in the kidney. Next, I passed a sensor wire through the open-ended catheter up to the kidney. The open-ended catheter was removed. I then removed the scope. With the wire in place as a safety wire, I then inserted a 7 rigid ureteroscope. The ureteroscope was inserted transurethrally under direct vision. Once I entered the bladder, I was then able to cannulate the ureteral orifice. I immediately came upon the stone in the distal ureter. Using a 200 micron holmium laser fiber, I broke up the stone and small fragments. All fragments were extracted with a 0-tipped basket. There were no significant fragments left over. Some were sent for analysis. Next, I advanced the scope all the way up to the proximal ureter. The ureter was unharmed and there were no stone fragments along the entire length of the ureter. At this point, the scope was removed. I then reinserted the cystoscope. Over the safety wire, I passed a 4.8 [place] variable length stent. The wire was removed. Fluoroscopy confirmed the proximal coil within the kidney, distal coil was noted in the bladder by direct vision. At this point, the patient was taken out of lithotomy position, revived from anesthesia and taken to recovery in stable condition.       Allen Wilson MD       [facility] / [place]  D: 04/09/2018 07:50     T: 04/09/2018 08:13  JOB #: [id number]

## 2020-02-06 NOTE — PROGRESS NOTES
Pt admitted for an elective surgical procedure. Pt is independent and her  will assist her upon discharge. No plan of care needs identified. Anticipate pt will be medically stable for discharge within the next 24-48 hours. CM available to assist as needed. Readmission Risk Assessment:     Moderate Risk and MSSP/Good Help ACO patients    RRAT Score:  13-20    Initial Assessment: physician follow     Emergency Contact:  spouse    Pertinent Medical Hx:     See clinical    PCP/Specialists:  [employer]:   None    DME:      None    Moderate Risk Care Transition Plan:  1. Evaluate for [place] or H2H, SNF, acute rehab, community care coordination of resources. 2. Involve patient/caregiver in assessment, planning, education and implement of intervention. 3. CM daily patient care huddles/interdisciplinary rounds. 4. PCP/Specialist appointment within 5  7 days made prior to discharge. 5. Facilitate transportation and logistics for follow-up appointments. 6. Medication reconciliation [id number]  7. Formal handoff between hospital provider and post-acute provider to transition patient  Handoff to [facility] Nurse Navigator or PCP practice. Care Management Interventions  PCP Verified by CM: Yes  Mode of Transport at Discharge:  Other (see comment) ()  Transition of Care Consult (CM Consult): Discharge Planning  Health Maintenance Reviewed: Yes  Current Support Network: Lives with Spouse  Confirm Follow Up Transport: Family  Plan discussed with Pt/Family/Caregiver: Yes  Discharge Location  Discharge Placement: Home 28

## 2021-05-07 ENCOUNTER — HOSPITAL ENCOUNTER (OUTPATIENT)
Dept: PREADMISSION TESTING | Age: 76
Discharge: HOME | End: 2021-05-07
Payer: MEDICARE

## 2021-05-07 ENCOUNTER — TRANSCRIBE ORDER (OUTPATIENT)
Dept: REGISTRATION | Age: 76
End: 2021-05-07

## 2021-05-07 DIAGNOSIS — N20.0 URIC ACID NEPHROLITHIASIS: ICD-10-CM

## 2021-05-07 DIAGNOSIS — N20.0 URIC ACID NEPHROLITHIASIS: Primary | ICD-10-CM

## 2021-05-07 LAB
ANION GAP SERPL CALC-SCNC: 5 MMOL/L (ref 3–18)
BUN SERPL-MCNC: 16 MG/DL (ref 7–18)
BUN/CREAT SERPL: 16 (ref 12–20)
CALCIUM SERPL-MCNC: 9.4 MG/DL (ref 8.5–10.1)
CHLORIDE SERPL-SCNC: 103 MMOL/L (ref 100–111)
CO2 SERPL-SCNC: 27 MMOL/L (ref 21–32)
CREAT SERPL-MCNC: 0.99 MG/DL (ref 0.6–1.3)
ERYTHROCYTE [DISTWIDTH] IN BLOOD BY AUTOMATED COUNT: 11.8 % (ref 11.6–14.5)
GLUCOSE SERPL-MCNC: 92 MG/DL (ref 74–99)
HCT VFR BLD AUTO: 40.3 % (ref 35–45)
HGB BLD-MCNC: 13.4 G/DL (ref 12–16)
MCH RBC QN AUTO: 33.3 PG (ref 24–34)
MCHC RBC AUTO-ENTMCNC: 33.3 G/DL (ref 31–37)
MCV RBC AUTO: 100 FL (ref 74–97)
PLATELET # BLD AUTO: 243 K/UL (ref 135–420)
PMV BLD AUTO: 10.1 FL (ref 9.2–11.8)
POTASSIUM SERPL-SCNC: 5 MMOL/L (ref 3.5–5.5)
RBC # BLD AUTO: 4.03 M/UL (ref 4.2–5.3)
SODIUM SERPL-SCNC: 135 MMOL/L (ref 136–145)
WBC # BLD AUTO: 7.4 K/UL (ref 4.6–13.2)

## 2021-05-07 PROCEDURE — U0003 INFECTIOUS AGENT DETECTION BY NUCLEIC ACID (DNA OR RNA); SEVERE ACUTE RESPIRATORY SYNDROME CORONAVIRUS 2 (SARS-COV-2) (CORONAVIRUS DISEASE [COVID-19]), AMPLIFIED PROBE TECHNIQUE, MAKING USE OF HIGH THROUGHPUT TECHNOLOGIES AS DESCRIBED BY CMS-2020-01-R: HCPCS

## 2021-05-07 PROCEDURE — 85027 COMPLETE CBC AUTOMATED: CPT

## 2021-05-07 PROCEDURE — 80048 BASIC METABOLIC PNL TOTAL CA: CPT

## 2021-05-07 PROCEDURE — 36415 COLL VENOUS BLD VENIPUNCTURE: CPT

## 2021-05-07 NOTE — PROGRESS NOTES
Pt had EKG done at [facility] on 4/28/2021. Please see care everywhere.

## 2021-05-08 LAB — SARS-COV-2, COV2NT: NOT DETECTED

## 2021-05-10 ENCOUNTER — ANESTHESIA EVENT (OUTPATIENT)
Dept: SURGERY | Age: 76
End: 2021-05-10
Payer: MEDICARE

## 2021-05-11 ENCOUNTER — APPOINTMENT (OUTPATIENT)
Dept: GENERAL RADIOLOGY | Age: 76
End: 2021-05-11
Attending: UROLOGY
Payer: MEDICARE

## 2021-05-11 ENCOUNTER — HOSPITAL ENCOUNTER (OUTPATIENT)
Age: 76
Setting detail: OUTPATIENT SURGERY
Discharge: HOME | End: 2021-05-11
Attending: UROLOGY | Admitting: UROLOGY
Payer: MEDICARE

## 2021-05-11 ENCOUNTER — ANESTHESIA (OUTPATIENT)
Dept: SURGERY | Age: 76
End: 2021-05-11
Payer: MEDICARE

## 2021-05-11 VITALS
SYSTOLIC BLOOD PRESSURE: 124 MMHG | DIASTOLIC BLOOD PRESSURE: 65 MMHG | WEIGHT: 154.44 LBS | BODY MASS INDEX: 24.82 KG/M2 | TEMPERATURE: 96.5 F | HEART RATE: 64 BPM | HEIGHT: 66 IN | RESPIRATION RATE: 16 BRPM | OXYGEN SATURATION: 100 %

## 2021-05-11 DIAGNOSIS — N20.0 CALCULUS OF KIDNEY: Primary | ICD-10-CM

## 2021-05-11 PROCEDURE — 74011250636 HC RX REV CODE- 250/636: Performed by: NURSE ANESTHETIST, CERTIFIED REGISTERED

## 2021-05-11 PROCEDURE — 74011000636 HC RX REV CODE- 636: Performed by: UROLOGY

## 2021-05-11 PROCEDURE — 74011000250 HC RX REV CODE- 250: Performed by: NURSE ANESTHETIST, CERTIFIED REGISTERED

## 2021-05-11 PROCEDURE — C1758 CATHETER, URETERAL: HCPCS | Performed by: UROLOGY

## 2021-05-11 PROCEDURE — 82360 CALCULUS ASSAY QUANT: CPT

## 2021-05-11 PROCEDURE — 76210000021 HC REC RM PH II 0.5 TO 1 HR: Performed by: UROLOGY

## 2021-05-11 PROCEDURE — 77030040361 HC SLV COMPR DVT MDII -B: Performed by: UROLOGY

## 2021-05-11 PROCEDURE — 76210000063 HC OR PH I REC FIRST 0.5 HR: Performed by: UROLOGY

## 2021-05-11 PROCEDURE — C1769 GUIDE WIRE: HCPCS | Performed by: UROLOGY

## 2021-05-11 PROCEDURE — 76060000032 HC ANESTHESIA 0.5 TO 1 HR: Performed by: UROLOGY

## 2021-05-11 PROCEDURE — C2617 STENT, NON-COR, TEM W/O DEL: HCPCS | Performed by: UROLOGY

## 2021-05-11 PROCEDURE — 74011250636 HC RX REV CODE- 250/636: Performed by: UROLOGY

## 2021-05-11 PROCEDURE — 77030006974 HC BSKT URET RTVR BSC -C: Performed by: UROLOGY

## 2021-05-11 PROCEDURE — C1894 INTRO/SHEATH, NON-LASER: HCPCS | Performed by: UROLOGY

## 2021-05-11 PROCEDURE — 74420 UROGRAPHY RTRGR +-KUB: CPT

## 2021-05-11 PROCEDURE — 77010033678 HC OXYGEN DAILY

## 2021-05-11 PROCEDURE — 77030010103 HC SEAL PRT ENDOSC OCOA -B: Performed by: UROLOGY

## 2021-05-11 PROCEDURE — 77030020782 HC GWN BAIR PAWS FLX 3M -B: Performed by: UROLOGY

## 2021-05-11 PROCEDURE — 76010000138 HC OR TIME 0.5 TO 1 HR: Performed by: UROLOGY

## 2021-05-11 PROCEDURE — 77030012508 HC MSK AIRWY LMA AMBU -A: Performed by: NURSE ANESTHETIST, CERTIFIED REGISTERED

## 2021-05-11 PROCEDURE — 2709999900 HC NON-CHARGEABLE SUPPLY: Performed by: UROLOGY

## 2021-05-11 DEVICE — VARIABLE LENGTH URETERAL STENT
Type: IMPLANTABLE DEVICE | Site: URETER | Status: FUNCTIONAL
Brand: CONTOUR VL™

## 2021-05-11 RX ORDER — NALOXONE HYDROCHLORIDE 4 MG/.1ML
SPRAY NASAL
Qty: 1 EACH | Refills: 0 | Status: SHIPPED | OUTPATIENT
Start: 2021-05-11 | End: 2022-08-09

## 2021-05-11 RX ORDER — METOCLOPRAMIDE HYDROCHLORIDE 5 MG/ML
10 INJECTION INTRAMUSCULAR; INTRAVENOUS ONCE
Status: DISCONTINUED | OUTPATIENT
Start: 2021-05-11 | End: 2021-05-11 | Stop reason: HOSPADM

## 2021-05-11 RX ORDER — PROPOFOL 10 MG/ML
INJECTION, EMULSION INTRAVENOUS AS NEEDED
Status: DISCONTINUED | OUTPATIENT
Start: 2021-05-11 | End: 2021-05-11 | Stop reason: HOSPADM

## 2021-05-11 RX ORDER — DEXAMETHASONE SODIUM PHOSPHATE 4 MG/ML
INJECTION, SOLUTION INTRA-ARTICULAR; INTRALESIONAL; INTRAMUSCULAR; INTRAVENOUS; SOFT TISSUE AS NEEDED
Status: DISCONTINUED | OUTPATIENT
Start: 2021-05-11 | End: 2021-05-11 | Stop reason: HOSPADM

## 2021-05-11 RX ORDER — ONDANSETRON 4 MG/1
4 TABLET, ORALLY DISINTEGRATING ORAL
Qty: 10 TAB | Refills: 2 | Status: SHIPPED | OUTPATIENT
Start: 2021-05-11 | End: 2022-08-09

## 2021-05-11 RX ORDER — ONDANSETRON 2 MG/ML
INJECTION INTRAMUSCULAR; INTRAVENOUS AS NEEDED
Status: DISCONTINUED | OUTPATIENT
Start: 2021-05-11 | End: 2021-05-11 | Stop reason: HOSPADM

## 2021-05-11 RX ORDER — NALOXONE HYDROCHLORIDE 0.4 MG/ML
0.2 INJECTION, SOLUTION INTRAMUSCULAR; INTRAVENOUS; SUBCUTANEOUS AS NEEDED
Status: DISCONTINUED | OUTPATIENT
Start: 2021-05-11 | End: 2021-05-11 | Stop reason: HOSPADM

## 2021-05-11 RX ORDER — LEVOFLOXACIN 250 MG/1
250 TABLET ORAL DAILY
Qty: 4 TAB | Refills: 0 | Status: SHIPPED | OUTPATIENT
Start: 2021-05-12 | End: 2021-05-16

## 2021-05-11 RX ORDER — LEVOFLOXACIN 5 MG/ML
500 INJECTION, SOLUTION INTRAVENOUS ONCE
Status: COMPLETED | OUTPATIENT
Start: 2021-05-11 | End: 2021-05-11

## 2021-05-11 RX ORDER — METOCLOPRAMIDE HYDROCHLORIDE 5 MG/ML
INJECTION INTRAMUSCULAR; INTRAVENOUS AS NEEDED
Status: DISCONTINUED | OUTPATIENT
Start: 2021-05-11 | End: 2021-05-11 | Stop reason: HOSPADM

## 2021-05-11 RX ORDER — HYDROMORPHONE HYDROCHLORIDE 1 MG/ML
0.5 INJECTION, SOLUTION INTRAMUSCULAR; INTRAVENOUS; SUBCUTANEOUS
Status: DISCONTINUED | OUTPATIENT
Start: 2021-05-11 | End: 2021-05-11 | Stop reason: HOSPADM

## 2021-05-11 RX ORDER — MIDAZOLAM HYDROCHLORIDE 1 MG/ML
INJECTION, SOLUTION INTRAMUSCULAR; INTRAVENOUS AS NEEDED
Status: DISCONTINUED | OUTPATIENT
Start: 2021-05-11 | End: 2021-05-11 | Stop reason: HOSPADM

## 2021-05-11 RX ORDER — DIPHENHYDRAMINE HYDROCHLORIDE 50 MG/ML
12.5 INJECTION, SOLUTION INTRAMUSCULAR; INTRAVENOUS
Status: DISCONTINUED | OUTPATIENT
Start: 2021-05-11 | End: 2021-05-11 | Stop reason: HOSPADM

## 2021-05-11 RX ORDER — FLUMAZENIL 0.1 MG/ML
0.2 INJECTION INTRAVENOUS
Status: DISCONTINUED | OUTPATIENT
Start: 2021-05-11 | End: 2021-05-11 | Stop reason: HOSPADM

## 2021-05-11 RX ORDER — FENTANYL CITRATE 50 UG/ML
25 INJECTION, SOLUTION INTRAMUSCULAR; INTRAVENOUS AS NEEDED
Status: DISCONTINUED | OUTPATIENT
Start: 2021-05-11 | End: 2021-05-11 | Stop reason: HOSPADM

## 2021-05-11 RX ORDER — KETOROLAC TROMETHAMINE 10 MG/1
10 TABLET, FILM COATED ORAL
Qty: 20 TAB | Refills: 0 | Status: SHIPPED | OUTPATIENT
Start: 2021-05-11 | End: 2021-05-16

## 2021-05-11 RX ORDER — FENTANYL CITRATE 50 UG/ML
INJECTION, SOLUTION INTRAMUSCULAR; INTRAVENOUS AS NEEDED
Status: DISCONTINUED | OUTPATIENT
Start: 2021-05-11 | End: 2021-05-11 | Stop reason: HOSPADM

## 2021-05-11 RX ORDER — SODIUM CHLORIDE, SODIUM LACTATE, POTASSIUM CHLORIDE, CALCIUM CHLORIDE 600; 310; 30; 20 MG/100ML; MG/100ML; MG/100ML; MG/100ML
1000 INJECTION, SOLUTION INTRAVENOUS CONTINUOUS
Status: DISCONTINUED | OUTPATIENT
Start: 2021-05-11 | End: 2021-05-11 | Stop reason: HOSPADM

## 2021-05-11 RX ORDER — LIDOCAINE HYDROCHLORIDE 20 MG/ML
INJECTION, SOLUTION EPIDURAL; INFILTRATION; INTRACAUDAL; PERINEURAL AS NEEDED
Status: DISCONTINUED | OUTPATIENT
Start: 2021-05-11 | End: 2021-05-11 | Stop reason: HOSPADM

## 2021-05-11 RX ORDER — ALBUTEROL SULFATE 0.83 MG/ML
2.5 SOLUTION RESPIRATORY (INHALATION) AS NEEDED
Status: DISCONTINUED | OUTPATIENT
Start: 2021-05-11 | End: 2021-05-11 | Stop reason: HOSPADM

## 2021-05-11 RX ORDER — SODIUM CHLORIDE, SODIUM LACTATE, POTASSIUM CHLORIDE, CALCIUM CHLORIDE 600; 310; 30; 20 MG/100ML; MG/100ML; MG/100ML; MG/100ML
125 INJECTION, SOLUTION INTRAVENOUS CONTINUOUS
Status: DISCONTINUED | OUTPATIENT
Start: 2021-05-11 | End: 2021-05-11 | Stop reason: HOSPADM

## 2021-05-11 RX ADMIN — METOCLOPRAMIDE 10 MG: 5 INJECTION, SOLUTION INTRAMUSCULAR; INTRAVENOUS at 14:34

## 2021-05-11 RX ADMIN — FENTANYL CITRATE 25 MCG: 50 INJECTION, SOLUTION INTRAMUSCULAR; INTRAVENOUS at 14:38

## 2021-05-11 RX ADMIN — ONDANSETRON HYDROCHLORIDE 4 MG: 2 INJECTION INTRAMUSCULAR; INTRAVENOUS at 14:21

## 2021-05-11 RX ADMIN — PROPOFOL 50 MG: 10 INJECTION, EMULSION INTRAVENOUS at 14:29

## 2021-05-11 RX ADMIN — MIDAZOLAM 2 MG: 1 INJECTION INTRAMUSCULAR; INTRAVENOUS at 14:21

## 2021-05-11 RX ADMIN — DEXAMETHASONE SODIUM PHOSPHATE 4 MG: 4 INJECTION, SOLUTION INTRAMUSCULAR; INTRAVENOUS at 14:34

## 2021-05-11 RX ADMIN — LEVOFLOXACIN 500 MG: 5 INJECTION, SOLUTION INTRAVENOUS at 14:21

## 2021-05-11 RX ADMIN — FENTANYL CITRATE 25 MCG: 50 INJECTION, SOLUTION INTRAMUSCULAR; INTRAVENOUS at 14:28

## 2021-05-11 RX ADMIN — SODIUM CHLORIDE, SODIUM LACTATE, POTASSIUM CHLORIDE, AND CALCIUM CHLORIDE 125 ML/HR: 600; 310; 30; 20 INJECTION, SOLUTION INTRAVENOUS at 13:14

## 2021-05-11 RX ADMIN — SODIUM CHLORIDE, SODIUM LACTATE, POTASSIUM CHLORIDE, AND CALCIUM CHLORIDE: 600; 310; 30; 20 INJECTION, SOLUTION INTRAVENOUS at 15:02

## 2021-05-11 RX ADMIN — LIDOCAINE HYDROCHLORIDE 20 MG: 20 INJECTION, SOLUTION EPIDURAL; INFILTRATION; INTRACAUDAL; PERINEURAL at 14:28

## 2021-05-11 RX ADMIN — PROPOFOL 150 MG: 10 INJECTION, EMULSION INTRAVENOUS at 14:28

## 2021-05-11 NOTE — H&P
Urology [email or web address] [place] [address]  Tel: [phone]  Fax: [phone]    Patient : Evan Leung   YOB: 1945           Assessment/Plan  # Detail Type Description    1. Assessment Kidney stone (N20.0). Patient Plan HSkarmen has ongoing intermiottent severe pain. She has known renal stones and she states that this feels like her prior stones. We will do a cysto, right URS with Holmium laser litho and stent. Risk of bleeding, infection, injury and possible continued pain was discussed. 2. Assessment Gross hematuria (R31.0). Patient Plan This continues to be an intermittent issue but she's on anticoagulation. Additional Visit Information      This 70year old female presents for Hematuria, Kidney and/or Ureteral Stone and Urinary incontinence. History of Present Illness  1. Hematuria   The patient presents with hematuria (gross). The problem began 1 year ago. The symptoms are intermittent. The problem has worsened. The patient denies pain. Pertinent history includes being at least 38years of age but not history of UTIs and family history of stones or prior prostate surgeries. Denies aggravating factors. Denies relieving factors. The patient also complains of incomplete emptying. The patient denies chills, dysuria, fever, nausea, urinary dribbling, urinary frequency, straining to urinate and vomiting. Additional information: UCc (3/27/2020) = negative  Cytolgoy (4/2020) = negative  CT (4/2020) - 8mm right renal pelvis stone sand bialteral small lower pole stones    6/25/2020 - She has hematurai yesterday and this mornking and now it is resovled. She has intermittent right flank pain and that is gone as of now too. No fevers or nausea. 12/2020 -- She is doing well. No gross hematuria lately. No dysuria lately. 3/2021 -- She ahd some gross hematuria today. she stays on xarelto.   she ahd some left flank and midline low back pain today    4/15/2021 -- She had 1 day of gross ehmaturia last week. 2.  Kidney and/or Ureteral Stone   Onset was 30 years ago. Severity level is moderate. It occurs intermittently. The problem is worse. Location of pain is right flank. The pain radiates to the groin. Prior stone type of unknown. Pertinent history includes history of prior stone(s) and past lithotripsy. The patient lists no relieving factors. Associated symptoms include hematuria. Pertinent negatives include chills, constipation, diarrhea, dysuria, fever, nausea, pain, dribbling (urinary), frequency (urinary), urinary straining and vomiting. Additional information: In the past she has had several ESWL and ureteoscopies. US (10/31/17): 1cm LLP stone and a 5mm RLP stone. 3/16/18 - for the past 5 weeks she has had intermittent right flank pain and nausea. CT (3/14/18) - 4x6mm RLP stone and 8mm LLP stone and a 4x6mm right distal ureteral stone (645 HU). 4/4/18 -- she had a left ureteroscopy for a distal ureteral stone. The stone was fragmented and removed on 3/23/18. Her stetn was removed on 3/26/18.  within a day her pain worsened. She was taken to the OR by Dr Evangelina Arrieta on 3/30/18 but DID NOT have a ureteral stone and her known lower pole renal stone was treated only. 7/12/18 -- She feels fine w/o any pain or problems or radiation or aggravating factors. no hematuria. no dysuria. KUB - small RLP stone and 6mm LLP stone. metabolic evaluation: normal serum studie;  24-hour urine tests: UOP>2L,  high urine calcium in 1 test and low urinary citrate in 1 test.    1/16/19 - She is doing poorly with migraines lately. Almost debilitating. She is considering a change in medication -- possibly Topamax. No flank pain or radiation or hematuria. It is just her migraines that are keeping her at [place] [id number]. 1/20/2020 -- She continues to gt migraines and continues to be evaluated.   No sotne pain or radiation .  KUB - bialteral lower pole stones at 8mm is largest.    3/30/2020 -- She dos take Beth Dwight 56. she had gross hematuria. No pain or radiation. 4/15/2020 -- CT shwoed bialteral small stones and a right 8mm renal pelvis stone    6/25/2020 - In the last day she ahs had intermittent rght flank pain. Nothing is there now. No dysuria or urgency or frequency. no radiation of the pain. no aggravating issues. -- it comes and goes. she feels very normal now, but 5 hous ago pain was a 3 out of 10.    12/2020 -- No  new flank pain or nausea. no aggravating issues. she tries to hydrate well. No pain at all.    3/2021 -- she has had intermittent Left flank and midline low back pain for 5 days, but it is better this afternoon. KUB - unchanged bilateral lower pole renal stones and no ureteral stone. 4/15/2021 -- She had right upper quadrant pain just underneath the ribs and it radiated to the right flank. No fevers. She ahs some issues of low Mg recently   Seh took  a tramadol and went to sleep and now feels better and no pain today -- but ti was awful yester    3. Urinary incontinence   Onset was 1 year ago. Severity level is mild-moderate. It occurs occasionally. The problem is with no change. Associated symptoms include hematuria and incomplete emptying. Pertinent negatives include chills, constipation, dysuria, fever, dribbling, urinary frequency and urinary straining. Additional information: She ahs some KYRIE that is new and she wears a pad when she goes out only. .          Past Medical/Surgical History   (Reviewed, updated)  Disease/disorder Onset Date Management Date Comments     Hysterectomy, total 2017      D&C 2017      trigger finger surgery 2005      Cholecystectomy 1996      Appendectomy 1980    Osteoarthritis         Arthroscopy, knee       cataract extraction and lens implantation       uterine suspension     KUB 9/12/13 ([facility])       Hx Renal Calculi       Allergies       Hyperlipidemia Post Menopausal       GERD       Urolithiasis           Gynecologic History  Patient is postmenopausal.     Problem List  Problem List reviewed.    Problem Description Onset Date Chronic Clinical Status Notes   Hyperlipidemia 06/07/2011 Y     Gastroesophageal reflux disease 06/07/2011 Y     Anxiety disorder 06/07/2011 Y     Disorder of bone and articular cartilage 06/07/2011 Y     Insomnia 06/07/2011 Y     Osteoporosis 05/27/2014 Y     Arthropathy 05/27/2014 Y     Neoplasm of uncertain behavior of skin 04/30/2012 Y       Medications (active prior to today)  Medication Instructions Start Date Stop Date Refilled Elsewhere   [facility]-400 m[facility]-300 mcg tablet take one tablet by oral route daily 09/11/2016   N   CALCIUM + VIT D otc //   Y   Probiotic 10 billion cell capsule take 1 tablet by oral route  every day 09/06/2017   N   tramadol 50 mg tablet take 1 tablet by oral route 2 times every day as needed 07/15/2018  07/15/2018 N   valacyclovir 500 mg tablet take 2 tablets by oral route daily for 5 days as needed 10/08/2019 09/10/2021 10/08/2019 N   atorvastatin 10 mg tablet take 1 tablet by oral route  every day 11/10/2020  11/10/2020 N   cetirizine 10 mg tablet take 1 tablet by ORAL route  every bedtime 01/13/2021 01/13/2021 N   Xarelto 20 mg tablet take 1 tablet by oral route  every day with the evening meal 01/13/2021 01/13/2021 N   clonazepam 1 mg tablet take 1 tablet by oral route  every day 01/15/2021  01/15/2021 N   famotidine 20 mg tablet take 1 tablet by oral route 2 times every day 02/01/2021   N   azelastine 0.15 % (205.5 mcg) nasal spray spray 1 spray by intranasal route 2 times every day in each nostril 02/01/2021 02/01/2021 N   fluticasone propionate 50 mcg/actuation nasal spray,suspension spray 1 spray by intranasal route  every day in each nostril 02/01/2021 07/19/2021 02/01/2021 N   metoprolol tartrate 50 mg tablet take 1 tablet po twice daily 03/02/2021 03/02/2021 N pantoprazole 40 mg tablet,delayed release take 1 tablet by oral route  every day 03/02/2021   N   potassium chloride ER 10 mEq capsule,extended release take 7 capsule by oral route  every day with food 03/02/2021 03/02/2021 N   Lunesta 2 mg tablet take 1 tablet (2MG)  by oral route  every day at bedtime 03/02/2021 03/02/2021 N   dicyclomine 20 mg tablet take 1 tablet by oral route 4 times every day 03/02/2021 03/02/2021 N   Ketorolac 10 mg ORAL TABLET Take one tablet by mouth daily 04/14/2021   N   Zofran 4 mg tablet take 1 tablet by oral route  every 6 hours as needed for nausea 04/14/2021 04/15/2021  N   dofetilide 500 mcg capsule take 1 capsule by oral route 2 times every day //   Y   eszopiclone 2 mg tablet take 1 tablet by oral route  every day at bedtime //   Y   Lopressor 50 mg tablet take 1 tablet by oral route 2 times every day with meals //   Y   metronidazole 500 mg tablet take 1 tablet by oral route  every 8 hours //   Y   promethazine 25 mg tablet take 1 tablet by ORAL route  every 6 hours prn for nausea //   Y   spironolactone 25 mg tablet take 1 tablet by oral route  every day //   Y       Medication Reconciliation  Medications reconciled today.     Medication Reviewed  Adherence Medication Name Sig Desc Elsewhere Status   taking as directed Centrum Silver Women 8 mg iron-400 mcg-300 mcg tablet take one tablet by oral route daily N Verified   taking as directed clonazepam 1 mg tablet take 1 tablet by oral route  every day N Verified   taking as directed tramadol 50 mg tablet take 1 tablet by oral route 2 times every day as needed N Verified   taking as directed atorvastatin 10 mg tablet take 1 tablet by oral route  every day N Verified   taking as directed dicyclomine 20 mg tablet take 1 tablet by oral route 4 times every day N Verified   taking as directed eszopiclone 2 mg tablet take 1 tablet by oral route  every day at bedtime Y Verified   taking as directed famotidine 20 mg tablet take 1 tablet by oral route 2 times every day N Verified   taking as directed metoprolol tartrate 50 mg tablet take 1 tablet po twice daily N Verified   taking as directed cetirizine 10 mg tablet take 1 tablet by ORAL route  every bedtime N Verified   taking as directed spironolactone 25 mg tablet take 1 tablet by oral route  every day Y Verified   taking as directed promethazine 25 mg tablet take 1 tablet by ORAL route  every 6 hours prn for nausea Y Verified   taking as directed pantoprazole 40 mg tablet,delayed release take 1 tablet by oral route  every day N Verified   taking as directed potassium chloride ER 10 mEq capsule,extended release take 7 capsule by oral route  every day with food N Verified   taking as directed CALCIUM + VIT D otc Y Verified   taking as directed metronidazole 500 mg tablet take 1 tablet by oral route  every 8 hours Y Verified   taking as directed dofetilide 500 mcg capsule take 1 capsule by oral route 2 times every day Y Verified   taking as directed Probiotic 10 billion cell capsule take 1 tablet by oral route  every day N Verified   taking as directed azelastine 0.15 % (205.5 mcg) nasal spray spray 1 spray by intranasal route 2 times every day in each nostril N Verified   taking as directed Lopressor 50 mg tablet take 1 tablet by oral route 2 times every day with meals Y Verified   taking as directed Xarelto 20 mg tablet take 1 tablet by oral route  every day with the evening meal N Verified   taking as directed Lunesta 2 mg tablet take 1 tablet (2MG)  by oral route  every day at bedtime N Verified   taking as directed Ketorolac 10 mg ORAL TABLET Take one tablet by mouth daily N Verified   taking as directed valacyclovir 500 mg tablet take 2 tablets by oral route daily for 5 days as needed N Verified   taking as directed fluticasone propionate 50 mcg/actuation nasal spray,suspension spray 1 spray by intranasal route  every day in each nostril N Verified     Allergies  Ingredient Reaction (Severity) Medication Name Comment   ACETAMINOPHEN  PERCOCET    ACETAMINOPHEN   Vicodin   ACETAMINOPHEN   Tylenol-Codeine   AMOXICILLIN TRIHYDRATE   AUGMENTIN   ASPIRIN      CEPHALEXIN MONOHYDRATE   Keflex   CIPROFLOXACIN      CODEINE PHOSPHATE   Tylenol-Codeine   DENOSUMAB  Prolia    DOXYCYCLINE hives     ERYTHROMYCIN BASE      FORMALDEHYDE      GABAPENTIN      GOLD SODIUM THIOMALATE      HYDROCODONE BITARTRATE   Vicodin   MEPERIDINE HCL   Demerol   OXYCODONE HCL  PERCOCET    PENICILLIN V      POTASSIUM CLAVULANATE   AUGMENTIN   SULFANILAMIDE      TETRACYCLINE Hives/Skin Rash       Reviewed, no changes. Family History   (Reviewed, updated)    Relationship Family Member Name  Age at Death Condition Onset Age Cause of Death       Family history of Thyroid disorder  N   Father  Y    N   Father    Hypertension  N   Father  Y  Cancer, lung  Y   Mother    Heart disease  N   Mother    Alzheimer's Disease  N   Mother    Thyroid disorder  N   Mother    Congestive heart failure  N   Sister    Osteoarthritis  N   Sister    Cancer, lung  N   Sister    Crohn's disease  N   Sister    Hyperthyroidism  N   Sister    Colon polyps  N   Sister    Cancer  N   Sister    Thyroid disorder  N   Sister    Depression  N     Social History  (Reviewed, updated)  Tobacco use reviewed. The patient is right-handed. Preferred language is [place]. Marital Status/Family/Social Support  Marital status:      Tobacco use status: Never smoked tobacco.    Smoking status: Never smoker. Tobacco Screening  Patient has never used tobacco. Patient has not used tobacco in the last 30 days. Patient has not used smokeless tobacco in the last 30 days. Smoking Status  Type Smoking Status Usage Per Day Years Used Pack Years Total Pack Years    Never smoker         Tobacco/Vaping Exposure  No passive smoke exposure. Alcohol  There is no history of alcohol use. Caffeine  The patient uses caffeine: tea and soda.  - 3 cups a day.    Lifestyle  light activity level. Never exercises. Diet  healthy. Orthodoxy/Spiritual  Patient agrees to transfusion. Review of Systems  System Neg/Pos Details   Constitutional Negative Chills, Fever and Pain. ENMT Negative Ear infections and Sore throat. Eyes Negative Blurred vision, Double vision and Eye pain. Respiratory Negative Asthma, Chronic cough, Dyspnea and Wheezing. Cardio Negative Chest pain. GI Negative Constipation, Decreased appetite, Diarrhea, Nausea and Vomiting.  Positive Hematuria, Incomplete emptying.  Negative Dysuria, Urinary dribbling, Urinary frequency and Urinary straining. Endocrine Negative Cold intolerance, Heat intolerance, Increased thirst and Weight loss. Neuro Negative Headache and Tremors. Psych Negative Anxiety and Depression. Integumentary Negative Itching skin and Rash. MS Negative Back pain and Joint pain. Hema/Lymph Negative Easy bleeding. Reproductive Positive The patient is post-menopausal.       Vital Signs   Gynecologic History  Patient is postmenopausal.      Height  Time ft in cm Last Measured Height Position   8:24 AM 5.0 6.00 167.64 08/28/2020 0     Weight/BSA/BMI  Time lb oz kg Context BMI kg/m2 BSA m2   8:24 .00  71.668  25.50      Measured by  Time Measured by   8:24 AM Pamila Scales     Physical Exam  Exam Findings Details   Constitutional * Overall appearance - clearly uncomfortable. Neck Exam Normal Inspection - Normal.   Respiratory Normal Inspection - Normal.   Abdomen Normal No abdominal tenderness. Genitourinary Normal No Suprapubic tenderness. No CVA tenderness. Extremity Normal No Edema. Psychiatric Normal Orientation - Oriented to time, place, person & situation. Appropriate mood and affect.      Immunizations Entered by History  Date Immunization   2/19/2021 12:00:00 AM SARS-COV-2 (COVID-19) vaccine, mRNA, spike protein, LNP, preservative free, 30 mcg/0.3mL dose   6/7/2011 12:00:00 AM Tdap (Adacel r)   Patient Education  # Patient Education   1.  Kidney Stone: Care Instructions       Medications (added, continued, or stopped today)  Start Date Medication Directions PRN Status PRN Reason Instruction Stop Date   11/10/2020 atorvastatin 10 mg tablet take 1 tablet by oral route  every day N      02/01/2021 azelastine 0.15 % (205.5 mcg) nasal spray spray 1 spray by intranasal route 2 times every day in each nostril N       CALCIUM + VIT D otc N      09/11/2016 Centrum Silver Women 8 mg iron-400 mcg-300 mcg tablet take one tablet by oral route daily N      01/13/2021 cetirizine 10 mg tablet take 1 tablet by ORAL route  every bedtime N      01/15/2021 clonazepam 1 mg tablet take 1 tablet by oral route  every day N      03/02/2021 dicyclomine 20 mg tablet take 1 tablet by oral route 4 times every day N       dofetilide 500 mcg capsule take 1 capsule by oral route 2 times every day N       eszopiclone 2 mg tablet take 1 tablet by oral route  every day at bedtime N      02/01/2021 famotidine 20 mg tablet take 1 tablet by oral route 2 times every day N      02/01/2021 fluticasone propionate 50 mcg/actuation nasal spray,suspension spray 1 spray by intranasal route  every day in each nostril N   07/19/2021 04/14/2021 Ketorolac 10 mg ORAL TABLET Take one tablet by mouth daily N       Lopressor 50 mg tablet take 1 tablet by oral route 2 times every day with meals N      03/02/2021 Lunesta 2 mg tablet take 1 tablet (2MG)  by oral route  every day at bedtime N      03/02/2021 metoprolol tartrate 50 mg tablet take 1 tablet po twice daily N       metronidazole 500 mg tablet take 1 tablet by oral route  every 8 hours N      03/02/2021 pantoprazole 40 mg tablet,delayed release take 1 tablet by oral route  every day N      03/02/2021 potassium chloride ER 10 mEq capsule,extended release take 7 capsule by oral route  every day with food N      09/06/2017 Probiotic 10 billion cell capsule take 1 tablet by oral route  every day N       promethazine 25 mg tablet take 1 tablet by ORAL route  every 6 hours prn for nausea N       spironolactone 25 mg tablet take 1 tablet by oral route  every day N      07/15/2018 tramadol 50 mg tablet take 1 tablet by oral route 2 times every day as needed N      10/08/2019 valacyclovir 500 mg tablet take 2 tablets by oral route daily for 5 days as needed N   09/10/2021   01/13/2021 Xarelto 20 mg tablet take 1 tablet by oral route  every day with the evening meal N      04/14/2021 Zofran 4 mg tablet take 1 tablet by oral route  every 6 hours as needed for nausea N   04/15/2021       Active Patient Care Team Members  Name Contact Agency Type Support Role Relationship Active Date Inactive Date Specialty    [place]   specialist    Ophthalmology   Rosalina Farias 19   Patient provider PCP   Abdoul Sanchez.   primary care provider       Mile Ho   encounter provider    Urology   Poornima Ferreira   allergy specialist       Denny Lesches   primary care provider       Daniichukadelfou Tamar   encounter provider    Urology   Perlita Marcus   specialist    Urology   Willy Villalta   encounter provider    Endocrinology   [facility]   specialist    Dermatology   [facility]   encounter provider    Gastroenterology   [facility]   specialist    Gastroenterology   [facility]   encounter provider    Gastroenterology       Provider:     Td Sandoval MD

## 2021-05-11 NOTE — PERIOP NOTES
[place] made aware, via phone, of pt last had her Xarelto as recent as last night. No new orders were given.

## 2021-05-11 NOTE — PERIOP NOTES
Reviewed PTA medication list with patient/caregiver and patient/caregiver denies any additional medications. Patient admits to having a responsible adult care for them at home for at least 24 hours after surgery. Patient encouraged to use gown warming system and informed that using said warming gown to regulate body temperature prior to a procedure has been shown to help reduce the risks of blood clots and infection. Patient's pharmacy of choice verified and documented in PTA medication section. Dual skin assessment & fall risk band verification completed with Soledad Pinto RN.

## 2021-05-11 NOTE — ANESTHESIA PREPROCEDURE EVALUATION
Relevant Problems   No relevant active problems       Anesthetic History     PONV          Review of Systems / Medical History  Patient summary reviewed and pertinent labs reviewed    Pulmonary              Pertinent negatives: No sleep apnea and smoker     Neuro/Psych         Psychiatric history     Cardiovascular    Hypertension: well controlled        Dysrhythmias : atrial fibrillation           GI/Hepatic/Renal     GERD: well controlled      Hiatal hernia     Endo/Other      Hypothyroidism  Arthritis     Other Findings              Physical Exam    Airway  Mallampati: II    Neck ROM: normal range of motion   Mouth opening: Normal     Cardiovascular    Rhythm: regular  Rate: normal         Dental  No notable dental hx       Pulmonary  Breath sounds clear to auscultation               Abdominal  GI exam deferred       Other Findings            Anesthetic Plan    ASA: 3  Anesthesia type: general          Induction: Intravenous  Anesthetic plan and risks discussed with: Patient

## 2021-05-11 NOTE — DISCHARGE INSTRUCTIONS
DISCHARGE SUMMARY from Nurse    PATIENT INSTRUCTIONS:    After general anesthesia or intravenous sedation, for 24 hours or while taking prescription Narcotics:  · Limit your activities  · Do not drive and operate hazardous machinery  · Do not make important personal or business decisions  · Do  not drink alcoholic beverages  · If you have not urinated within 8 hours after discharge, please contact your surgeon on call. Report the following to your surgeon:  · Excessive pain, swelling, redness or odor of or around the surgical area  · Temperature over 100.5  · Nausea and vomiting lasting longer than 4 hours or if unable to take medications  · Any signs of decreased circulation or nerve impairment to extremity: change in color, persistent  numbness, tingling, coldness or increase pain  · Any questions    What to do at Home:  [address] A POST OP CHECK UP    If you experience any of the following symptoms heavy bleeding, unable to void, fevers, severe pain, please follow up with dr Yousuf Vital    *  Please give a list of your current medications to your Primary Care Provider. *  Please update this list whenever your medications are discontinued, doses are      changed, or new medications (including over-the-counter products) are added. *  Please carry medication information at all times in case of emergency situations. These are general instructions for a healthy lifestyle:    No smoking/ No tobacco products/ Avoid exposure to second hand smoke  Surgeon General's Warning:  Quitting smoking now greatly reduces serious risk to your health.     Obesity, smoking, and sedentary lifestyle greatly increases your risk for illness    A healthy diet, regular physical exercise & weight monitoring are important for maintaining a healthy lifestyle    You may be retaining fluid if you have a history of heart failure or if you experience any of the following symptoms:  Weight gain of 3 pounds or more overnight or 5 pounds in a week, increased swelling in our hands or feet or shortness of breath while lying flat in bed. Please call your doctor as soon as you notice any of these symptoms; do not wait until your next office visit. The discharge information has been reviewed with the patient and caregiver. The patient and caregiver verbalized understanding. Discharge medications reviewed with the patient and caregiver and appropriate educational materials and side effects teaching were provided.   ___________________________________________________________________________________________________________________________________    Patient armband removed and shredded

## 2021-05-11 NOTE — BRIEF OP NOTE
Brief Postoperative Note    Patient: Jacque De Dios  YOB: 1945  MRN: [id number]    Date of Procedure: 5/11/2021     Pre-Op Diagnosis: RENAL STONES    Post-Op Diagnosis: Same as preoperative diagnosis. Procedure(s):  CYSTOSCOPY, RIGHT RETROGRADE PYELOGRAM WITH INTERPRETATION, RIGHT URETEROSCOPY LASER LITHOTRIPSY WITH HOLMIUM, STENT PLACEMENT WITH C-ARM (AGILITY)    Surgeon(s):  Sherral Frankel, MD    Surgical Assistant: None    Anesthesia: General     Estimated Blood Loss (mL): Minimal    Complications: None    Specimens:   ID Type Source Tests Collected by Time Destination   1 : RIGHT URETER STONE  Fresh URETER, RIGHT  Sherral Frankel, MD 5/11/2021 2316 Pathology        Implants:   Implant Name Type Inv. Item Serial No.  Lot No. LRB No. Used Action   STENT URET 4.8FR [id number] PERCFLX HYDR+ SFT PGTL TAPR TIP - [id number]  STENT URET 4.8FR [id number] PERCFLX HYDR+ SFT PGTL TAPR TIP  [employer] [id number] Right 1 Implanted       Drains: * No LDAs found *    Findings: 2 LARGE STONES IN RIGHT LOWER POLE - ONE WAS REALLY STUCK WITHIN A CALYX. ALL LASERED AND EXTRACTED AND NO SIGNIFICANT FRAGMENTS LEFT BEHIND.       Electronically Signed by Bhavin Summers MD on 5/11/2021 at 3:13 PM

## 2021-05-11 NOTE — PERIOP NOTES
Allyssa Melgoza RN in [facility] has been notified that patient last took her Xarelto 5/10/2021 at [phone].

## 2021-05-11 NOTE — ANESTHESIA POSTPROCEDURE EVALUATION
Post-Anesthesia Evaluation & Assessment    Visit Vitals  BP (!) 124/49   Pulse 66   Temp 36.9 °C (98.4 °F)   Resp 18   Ht 5' 6\" (1.676 m)   Wt 70.1 kg (154 lb 7 oz)   SpO2 99%   BMI 24.93 kg/m²       Nausea/Vomiting: no nausea and no vomiting    Post-operative hydration adequate. Pain score (VAS): 0    Mental status & Level of consciousness: alert and oriented x 3    Neurological status: moves all extremities, sensation grossly intact    Pulmonary status: airway patent, no supplemental oxygen required    Complications related to anesthesia: none    Patient has met all discharge requirements. Additional comments:  Procedure(s):  CYSTOSCOPY, RIGHT URETEROSCOPY LASER LITHOTRIPSY WITH HOLMIUM, STENT PLACEMENT WITH C-ARM (AGILITY). general    <BSHSIANPOST>    INITIAL Post-op Vital signs:   Vitals Value Taken Time   /49 05/11/21 1537   Temp 36.9 °C (98.4 °F) 05/11/21 1520   Pulse 61 05/11/21 1541   Resp 21 05/11/21 1541   SpO2 100 % 05/11/21 1541   Vitals shown include unvalidated device data.

## 2021-05-11 NOTE — PERIOP NOTES
Spoke with Dr. Danish Mahoney and confirmed patients allergies and requested change in Antibiotic due to allergies. Levaquin 500 mg IVPB ordered.

## 2021-05-12 NOTE — OP NOTES
[facility]  OPERATIVE REPORT    Name:  Klever Chavez  MR#:   [id number]  :  1945  ACCOUNT #:  [de-identified]  DATE OF SERVICE:  2021    PREOPERATIVE DIAGNOSIS:  Renal stones. POSTOPERATIVE renal stones. PROCEDURES PERFORMED:  Cystoscopy, right retrograde pyelogram with interpretation, and right ureteroscopy with holmium laser lithotripsy and stent placement. SURGEON:  Frida Williamson MD    ASSISTANT:  None. ANESTHESIA:  General.    COMPLICATIONS:  None. SPECIMENS REMOVED:  Right renal stone. IMPLANTS:  A 4.8-Luxembourger variable-length stent. DRAINS:  None. ESTIMATED BLOOD LOSS:  Minimal.    FINDINGS:  The patient had two large stones in the right lower pole, one which was really stuck within the izaiah. All were lasered and extracted and no significant fragments were left behind. CLINICAL NOTE:  The patient is a 24-year-old female with a history of stones. She has had ongoing intermittent right-sided flank pain that could be severe at times. She presented for ureteroscopy. PROCEDURE:  Preoperatively, the risks and benefits of surgery were described to the patient. The risks included, but were not limited to bleeding, infection, injury to the bladder, injury to the ureter, injury to the kidney, and possible need for future procedure to be made stone-free. The patient understood the risks and signed the informed consent. The patient was taken to the operating room and placed on the OR table in supine position. She was administered general anesthetic. She was administered intravenous antibiotics. She was placed in the dorsal lithotomy position and prepped and draped in the usual sterile manner. A 22-Luxembourger cystoscope and sheath were then inserted transurethrally atraumatically under direct vision using a 30-degree lens. Panendoscopy was done. Bilateral ureteral orifices were in normal anatomic position.   There were no stones, no tumors, and no areas of concern within the bladder. The right ureteral orifice was identified and cannulated with a 5-Trinidadian open-ended ureteral catheter, and retrograde pyelogram was done in the usual manner using 18 mL of Visipaque dye. There were no filling defects along entire length of the ureter. There was a slightly narrowed UPJ, and there was extrarenal pelvis. There was no blunting of calices. No true hydronephrosis. There were no filling defects. Next, I passed a Sensor wire through the open-ended catheter up to the kidney. The open-ended catheter was removed. The scope was removed. I then passed a dual-lumen catheter and passed a second Sensor wire up to the kidney. The dual-lumen catheter was removed. One wire was a safety wire and the other wire was a working wire. Over the working wire, I passed an 11/13 28-cm navigator sheath. The inner working of the sheath and the working wire were then removed. I then passed a flexible ureteroscope through the sheath up the ureter up into the kidney. Panpyeloscopy was done. There were no issues at all in the ureter. No stones, no tumors, and no areas of concern. Up in the kidney, every izaiah was entered. The upper pole and midpole were fine. However, in the lower pole, there were two large stones one of which was really tightly embedded within a izaiah and renal papilla. The stones were lasered. The one embedded within the renal papilla was lasered, and beneath it, there was a lot of urine that was trapped behind it and gushed out. No purulence was identified. No abnormalities otherwise. Using a ZeroTip basket, the stone fragments were removed. By the end of the case, there were no significant fragments. I then slowly backed the scope and the sheath down the ureter. The ureter was intact and unharmed, and there were no fragments along the length of the ureter. I then reinserted the cystoscope. Over the safety wire, I passed a 4.8-Trinidadian variable-length stent. The wire was removed. Fluoroscopy confirmed the proximal coil was in the kidney, and the distal coil was noted to be in the bladder by direct vision. At this point, the patient was taken out of lithotomy position, revived from anesthesia, and taken to Recovery in stable condition.       Peña Goldstein MD      [id number]  D:  05/11/2021 15:20  T:  05/11/2021 22:31  JOB #:  [id number]

## 2021-05-18 LAB
CALCIUM OXALATE DIHYDRATE MFR STONE IR: 50 %
COLOR STONE: NORMAL
COM MFR STONE: 40 %
COMMENT, 519994: NORMAL
COMPOSITION, 120440: NORMAL
DISCLAIMER, STO32L: NORMAL
HYDROXYAPATITE: 10 %
NIDUS STONE QL: NORMAL
PHOTO, 120675: NORMAL
PLEASE NOTE, 130422: NORMAL
SHELL, 120438: NORMAL
SIZE STONE: NORMAL MM
SPECIMEN SOURCE: NORMAL
SURFACE CRYSTALS, 120439: NORMAL
WT STONE: 40 MG

## 2022-03-18 PROBLEM — N13.2 URETERAL STONE WITH HYDRONEPHROSIS: Status: ACTIVE | Noted: 2018-03-20

## 2022-03-19 PROBLEM — R19.7 DIARRHEA: Status: ACTIVE | Noted: 2017-07-14

## 2022-03-19 PROBLEM — N84.0 POLYP OF CORPUS UTERI: Status: ACTIVE | Noted: 2017-05-30

## 2022-03-20 PROBLEM — E86.0 DEHYDRATION: Status: ACTIVE | Noted: 2017-07-14

## 2022-04-26 NOTE — ED PROVIDER NOTES
[address]  EMERGENCY DEPARTMENT HISTORY AND PHYSICAL EXAM       Date: 7/19/2017   Patient Name: Jeromy Robbins   YOB: 1945  Medical Record Number: [id number]    History of Presenting Illness     Chief Complaint   Patient presents with    Rash    Thrush        History Provided By:  patient    Additional History: 6:08 PM  Jeromy Robbins is a 73 y.o. female who presents to the emergency department C/O painful and itchy rash underneath her breast as well as burning sensation to tip of tongue. Pt reports that she has had shingles before and that this rash may feel similar but much less severe than her previous shingles outbreak on left hip. Associated sx include feeling of tongue swelling and feeling like she \"burned my tongue on hot tea or coffee, which I can't even drink\", onset today. Pt is s/p laparoscopic hysterectomy, bilateral salpingo-oophorectomy, and lysis of adhesions on 6/26/17 by Dr. Evelyn Acuña for precancerous cells found after pt had a D&C on 6/9/17. Pt was admitted to this hospital on 7/14/17 under Dr. Skyla Hawley for diarrhea and was + for C. diff. Pt received IV Flagyl during her admission and was discharged home with Flagyl PO and simethicone 2 days ago. Pt states that she is now having formed stools, no abd pain or fever. +tolerating PO foods/fluids. No use of prednisone or steroids. Denies lip swelling, slurred speech, numbness/weakness and any other sx or complaints.      Primary Care Provider: Kae Watts MD   Specialist:    Past History     Past Medical History:   Past Medical History:   Diagnosis Date    Arthritis     Asthma     Cataract     Gall stones     GERD (gastroesophageal reflux disease)     Hay fever     Hiatal hernia with gastroesophageal reflux disease and esophagitis     Hyperlipidemia     Hypertension     Ill-defined condition     kidney stones    Kidney stones     bi-lateral    Nausea & vomiting     Psychiatric disorder We added an OCP after last visit, did she start taking them? anxiety    Recurrent UTI     Sinusitis     Thyroid disease     thyroid nodules        Past Surgical History:   Past Surgical History:   Procedure Laterality Date    HX APPENDECTOMY      HX CATARACT REMOVAL Bilateral     HX CHOLECYSTECTOMY      HX GYN  1980    uterine suspension    HX KNEE ARTHROSCOPY      HX LITHOTRIPSY      HX ORTHOPAEDIC Right     trigger finger release    HX OTHER SURGICAL      cystourethroscopy        Family History:   Family History   Problem Relation Age of Onset    Heart Disease Mother     Cancer Father     Hypertension Father         Social History:   Social History   Substance Use Topics    Smoking status: Never Smoker    Smokeless tobacco: Never Used    Alcohol use No        Allergies: Allergies   Allergen Reactions    Acetaminophen Unable to Obtain    Amoxicillin Hives    Aspirin Hives    Cephalexin Hives and Nausea and Vomiting    Ciprofloxacin Other (comments)     GI distress    Clavulanic Acid Unknown (comments)    Codeine Hives    Doxycycline Hives    Eryc [Erythromycin] Other (comments)     Gi ditress    Formaldehyde Unknown (comments)    Gold Sodium Thiomalate Unknown (comments)    Hydrocodone Unknown (comments)    Hydrocodone-Acetaminophen Rash    Meperidine Hives    Neomycin Other (comments)    Nsaids (Non-Steroidal Anti-Inflammatory Drug) Unknown (comments)    Oxycodone Unknown (comments)    Penicillin G Hives    Penicillin G Potassium In D5w Unknown (comments)    Percocet [Oxycodone-Acetaminophen] Other (comments)     GI    Potassium Unknown (comments)    Prolia [Denosumab] Hives    Sulfa (Sulfonamide Antibiotics) Hives    Sulfanilamide Unknown (comments)        Review of Systems   Review of Systems   HENT: Negative for facial swelling (lips). (+) feeling of tongue swelling and burnt tongue   Skin: Positive for rash (underneath right breast, painful, itchy). All other systems reviewed and are negative.       Physical Exam  Vitals:    07/19/17 1807 07/19/17 1930 07/19/17 1945   BP: 137/69 131/74 131/74   Pulse: 95     Resp: 20     Temp: 98.2 °F (36.8 °C)     SpO2: 100%         Physical Exam   Nursing note and vitals reviewed. Vital signs and nursing notes reviewed. CONSTITUTIONAL: Alert. Well-appearing; well-nourished; in no apparent distress. HEAD: Normocephalic; atraumatic. EYES: PERRL; Conjunctiva clear. ENT: TM's normal. External ear normal. Normal nose; no rhinorrhea. Normal pharynx. Tonsils not enlarged without exudate. Moist mucus membranes. No tongue swelling or oral lesions. NECK: Supple; FROM without difficulty, non-tender; no cervical lymphadenopathy. CV: Normal S1, S2; no murmurs, rubs, or gallops. No chest wall tenderness. RESPIRATORY: Normal chest excursion with respiration; breath sounds clear and equal bilaterally; no wheezes, rhonchi, or rales. GI: Normal bowel sounds; non-distended; non-tender; no guarding or rigidity; no palpable organomegaly. No CVA tenderness. BACK:  No evidence of trauma or deformity. Non-tender to palpation. FROM without difficulty. Negative straight leg raise bilaterally. EXT: Normal ROM in all four extremities; non-tender to palpation. SKIN: Normal for age and race; warm; dry; good turgor; 3 small papules with 1 small area of vesicular lesions underneath right breast, ?early shingles vs medication reaction vs contact dermatitis. NEURO: A & O x3. Cranial nerves 2-12 intact. Motor 5/5 bilaterally. Sensation intact. PSYCH:  Mood and affect appropriate. Diagnostic Study Results     Labs -    No results found for this or any previous visit (from the past 12 hour(s)). Radiologic Studies -  The following have been ordered and reviewed:  No orders to display           Medical Decision Making   I am the first provider for this patient.      I reviewed the vital signs, available nursing notes, past medical history, past surgical history, family history and social history. Vital Signs-Reviewed the patient's vital signs. Patient Vitals for the past 12 hrs:   Temp Pulse Resp BP SpO2   07/19/17 1945 - - - 131/74 -   07/19/17 1930 - - - 131/74 -   07/19/17 1807 98.2 °F (36.8 °C) 95 20 137/69 100 %       Pulse Oximetry Analysis - Normal 100% on room air     Old Medical Records: Nursing notes. Procedures:   Procedures    ED Course:  6:08 PM  Initial assessment performed. The patients presenting problems have been discussed, and they are in agreement with the care plan formulated and outlined with them. I have encouraged them to ask questions as they arise throughout their visit. CONSULT NOTE:   7:20 PM  Cami Duvall PA-C spoke with Darrell Yang MD   Specialty: gynecologic oncology  Discussed pt's hx, disposition, and available diagnostic and imaging results over the telephone. Reviewed care plans. Consulting physician does not recommend discontinuing the flagyl at this time. He states this could be an adverse reaction however it does not sound like angioedema or more severe reaction. It does warrant monitoring but at this time he recommends reassuring pt. May give something like viscous lidocaine for the tongue irritation and follow up with her PCP tomorrow. He will also call to check on pt tomorrow. As far as the rash under her right breast, he agrees with rx'ing acyclovir but also agrees to wait and see if this rash truly progresses to a shingles. Medications Given in the ED:  Medications   diphenhydrAMINE (BENADRYL) injection 25 mg (25 mg IntraVENous Given 7/19/17 1841)   methylPREDNISolone (PF) (SOLU-MEDROL) injection 125 mg (125 mg IntraVENous Given 7/19/17 1840)       Discharge Note:  7:59 PM  Pt has been reexamined. Patient has no new complaints, changes, or physical findings. Care plan outlined and precautions discussed. Results were reviewed with the patient. All medications were reviewed with the patient; will d/c home.  All of pt's questions and concerns were addressed. Patient was instructed and agrees to follow up with GYN oncology and PCP, as well as to return to the ED upon further deterioration. Patient is ready to go home. Diagnosis   Clinical Impression:   1. Adverse effects of medication, initial encounter    2. Dermatitis           Follow-up Information     Follow up With Details Comments [address], MD In 1 day  [address], [address] [facility] [address] Drake Kapoor MD In 1 day  [place] [address]  [phone]            Discharge Medication List as of 7/19/2017  7:57 PM      CONTINUE these medications which have NOT CHANGED    Details   magnesium 250 mg tab Take  by mouth., Historical Med      POTASSIUM PO Take 10 mEq by mouth daily. , Historical Med      b complex-vitamin c-folic acid (NEPHROCAPS) 1 mg capsule Take 1 Cap by mouth daily. , Historical Med      famotidine (PEPCID) 20 mg tablet Take 20 mg by mouth as needed., Historical Med      montelukast (SINGULAIR) 10 mg tablet Take 10 mg by mouth daily as needed., Historical Med      ERGOCALCIFEROL, VITAMIN D2, (VITAMIN D2 PO) Take 1 Tab by mouth daily. , Historical Med      amLODIPine (NORVASC) 2.5 mg tablet Take 2.5 mg by mouth nightly., Historical Med      aspirin delayed-release 81 mg tablet Take  by mouth daily. , Historical Med      butalbital-aspirin-caffeine (FIORINAL) capsule Take 1 Cap by mouth every four (4) hours as needed for Pain., Historical Med      multivitamin (ONE A DAY) tablet Take 1 Tab by mouth daily. , Historical Med      Cetirizine 10 mg cap Take 10 mg by mouth daily. , Historical Med      valACYclovir (VALTREX) 500 mg tablet Take 500 mg by mouth two (2) times a day., Historical Med      levocetirizine (XYZAL) 5 mg tablet Take 5 mg by mouth daily as needed for Allergies. , Historical Med      Dexlansoprazole (DEXILANT) 30 mg CpDM Take 30 mg by mouth daily. , Historical Med dicyclomine (BENTYL) 20 mg tablet Take 20 mg by mouth two (2) times a day., Historical Med      eszopiclone (LUNESTA) 2 mg tablet Take 2 mg by mouth nightly., Historical Med      clonazePAM (KLONOPIN) 1 mg tablet Take 1 mg by mouth daily. Indications: PANIC DISORDER, Historical Med      simvastatin (ZOCOR) 40 mg tablet Take 40 mg by mouth nightly., Historical Med             _______________________________   Attestations: This note is prepared by Jia Tran, acting as a Scribe for [employer] on 6:07 PM on 7/19/2017. [employer]: The scribe's documentation has been prepared under my direction and personally reviewed by me in its entirety.   _______________________________

## 2022-05-04 PROBLEM — N39.0 UTI (URINARY TRACT INFECTION): Status: ACTIVE | Noted: 2022-05-04

## 2022-06-03 PROBLEM — N39.0 UTI (URINARY TRACT INFECTION): Status: RESOLVED | Noted: 2022-05-04 | Resolved: 2022-06-03

## 2022-08-01 ENCOUNTER — HOSPITAL ENCOUNTER (OUTPATIENT)
Dept: PREADMISSION TESTING | Age: 77
Discharge: HOME | End: 2022-08-01
Payer: MEDICARE

## 2022-08-01 ENCOUNTER — TRANSCRIBE ORDER (OUTPATIENT)
Dept: REGISTRATION | Age: 77
End: 2022-08-01

## 2022-08-01 DIAGNOSIS — M20.001 DEFORMITY OF FINGER OF RIGHT HAND: Primary | ICD-10-CM

## 2022-08-01 DIAGNOSIS — M20.001 DEFORMITY OF FINGER OF RIGHT HAND: ICD-10-CM

## 2022-08-01 LAB
ANION GAP SERPL CALC-SCNC: 2 MMOL/L (ref 3–18)
BASOPHILS # BLD: 0.1 K/UL (ref 0–0.1)
BASOPHILS NFR BLD: 1 % (ref 0–2)
BUN SERPL-MCNC: 14 MG/DL (ref 7–18)
BUN/CREAT SERPL: 15 (ref 12–20)
CALCIUM SERPL-MCNC: 9.5 MG/DL (ref 8.5–10.1)
CHLORIDE SERPL-SCNC: 102 MMOL/L (ref 100–111)
CO2 SERPL-SCNC: 30 MMOL/L (ref 21–32)
CREAT SERPL-MCNC: 0.93 MG/DL (ref 0.6–1.3)
DIFFERENTIAL METHOD BLD: ABNORMAL
EOSINOPHIL # BLD: 0.1 K/UL (ref 0–0.4)
EOSINOPHIL NFR BLD: 3 % (ref 0–5)
ERYTHROCYTE [DISTWIDTH] IN BLOOD BY AUTOMATED COUNT: 11.4 % (ref 11.6–14.5)
GLUCOSE SERPL-MCNC: 95 MG/DL (ref 74–99)
HCT VFR BLD AUTO: 36.8 % (ref 35–45)
HGB BLD-MCNC: 12.5 G/DL (ref 12–16)
IMM GRANULOCYTES # BLD AUTO: 0 K/UL (ref 0–0.04)
IMM GRANULOCYTES NFR BLD AUTO: 0 % (ref 0–0.5)
LYMPHOCYTES # BLD: 1.8 K/UL (ref 0.9–3.6)
LYMPHOCYTES NFR BLD: 33 % (ref 21–52)
MCH RBC QN AUTO: 33.6 PG (ref 24–34)
MCHC RBC AUTO-ENTMCNC: 34 G/DL (ref 31–37)
MCV RBC AUTO: 98.9 FL (ref 78–100)
MONOCYTES # BLD: 0.7 K/UL (ref 0.05–1.2)
MONOCYTES NFR BLD: 12 % (ref 3–10)
NEUTS SEG # BLD: 2.8 K/UL (ref 1.8–8)
NEUTS SEG NFR BLD: 51 % (ref 40–73)
NRBC # BLD: 0 K/UL (ref 0–0.01)
NRBC BLD-RTO: 0 PER 100 WBC
PLATELET # BLD AUTO: 234 K/UL (ref 135–420)
PMV BLD AUTO: 9.6 FL (ref 9.2–11.8)
POTASSIUM SERPL-SCNC: 4.3 MMOL/L (ref 3.5–5.5)
RBC # BLD AUTO: 3.72 M/UL (ref 4.2–5.3)
SODIUM SERPL-SCNC: 134 MMOL/L (ref 136–145)
WBC # BLD AUTO: 5.4 K/UL (ref 4.6–13.2)

## 2022-08-01 PROCEDURE — 36415 COLL VENOUS BLD VENIPUNCTURE: CPT

## 2022-08-01 PROCEDURE — 80048 BASIC METABOLIC PNL TOTAL CA: CPT

## 2022-08-01 PROCEDURE — 85025 COMPLETE CBC W/AUTO DIFF WBC: CPT

## 2022-08-03 ENCOUNTER — HOSPITAL ENCOUNTER (OUTPATIENT)
Dept: PREADMISSION TESTING | Age: 77
Discharge: HOME | End: 2022-08-03

## 2022-08-03 VITALS — BODY MASS INDEX: 24.27 KG/M2 | HEIGHT: 66 IN | WEIGHT: 151 LBS

## 2022-08-03 RX ORDER — SODIUM CHLORIDE, SODIUM LACTATE, POTASSIUM CHLORIDE, CALCIUM CHLORIDE 600; 310; 30; 20 MG/100ML; MG/100ML; MG/100ML; MG/100ML
125 INJECTION, SOLUTION INTRAVENOUS CONTINUOUS
Status: CANCELLED | OUTPATIENT
Start: 2022-08-03

## 2022-08-03 RX ORDER — PANTOPRAZOLE SODIUM 40 MG/1
40 TABLET, DELAYED RELEASE ORAL DAILY
COMMUNITY

## 2022-08-03 RX ORDER — VALACYCLOVIR HYDROCHLORIDE 500 MG/1
500 TABLET, FILM COATED ORAL
COMMUNITY

## 2022-08-03 NOTE — PERIOP NOTES
PAT - SURGICAL PRE-ADMISSION INSTRUCTIONS    NAME:  Stephen Chu                                                          TODAY'S DATE:  8/3/2022    SURGERY DATE:  8/9/2022                                  SURGERY ARRIVAL TIME:   TBA    Do NOT eat or drink anything, including candy or gum, after MIDNIGHT on 8/9/2022 , unless you have specific instructions from your Surgeon or Anesthesia Provider to do so. No smoking 24 hours before surgery. No alcohol 24 hours prior to the day of surgery. No recreational drugs for one week prior to the day of surgery. Leave all valuables, including money/purse, at home. Remove all jewelry, nail polish, makeup (including mascara); no lotions, powders, deodorant, or perfume/cologne/after shave. Glasses/Contact lenses and Dentures may be worn to the hospital.  They will be removed prior to surgery. Call your doctor if symptoms of a cold or illness develop within 24 ours prior to surgery. AN ADULT MUST DRIVE YOU HOME AFTER OUTPATIENT SURGERY. If you are having an OUTPATIENT procedure, please make arrangements for a responsible adult to be with you for 24 hours after your surgery. If you are admitted to the hospital, you will be assigned to a bed after surgery is complete. Normally a family member will not be able to see you until you are in your assigned bed. 12. Visitation Restrictions Explained. Special Instructions:  Covid Test not needed.  Patient vaccinated , Quarantine requirements discussed  Has Advanced Directive no DNR will bring copy to be scanned into record  Take these medications the morning of surgery with a sip of water:  famotidine, metoprolol tarrate,, STOP anticoagulants AT LEAST 1 WEEK PRIOR to your surgery or, follow other MD instructions:  Xarelto    Patient Prep:    use CHG solution three days prior to procedure     These surgical instructions were reviewed with patient during the PAT phone call

## 2022-08-09 ENCOUNTER — APPOINTMENT (OUTPATIENT)
Dept: GENERAL RADIOLOGY | Age: 77
End: 2022-08-09
Attending: UROLOGY
Payer: MEDICARE

## 2022-08-09 ENCOUNTER — ANESTHESIA EVENT (OUTPATIENT)
Dept: SURGERY | Age: 77
End: 2022-08-09
Payer: MEDICARE

## 2022-08-09 ENCOUNTER — ANESTHESIA (OUTPATIENT)
Dept: SURGERY | Age: 77
End: 2022-08-09
Payer: MEDICARE

## 2022-08-09 ENCOUNTER — HOSPITAL ENCOUNTER (OUTPATIENT)
Age: 77
Setting detail: OUTPATIENT SURGERY
Discharge: HOME | End: 2022-08-09
Attending: UROLOGY | Admitting: UROLOGY
Payer: MEDICARE

## 2022-08-09 VITALS
SYSTOLIC BLOOD PRESSURE: 122 MMHG | TEMPERATURE: 96.7 F | WEIGHT: 154.1 LBS | HEIGHT: 66 IN | BODY MASS INDEX: 24.77 KG/M2 | OXYGEN SATURATION: 100 % | DIASTOLIC BLOOD PRESSURE: 56 MMHG | RESPIRATION RATE: 16 BRPM | HEART RATE: 62 BPM

## 2022-08-09 DIAGNOSIS — N13.2 URETERAL STONE WITH HYDRONEPHROSIS: Primary | ICD-10-CM

## 2022-08-09 PROCEDURE — 76210000021 HC REC RM PH II 0.5 TO 1 HR: Performed by: UROLOGY

## 2022-08-09 PROCEDURE — 77030020782 HC GWN BAIR PAWS FLX 3M -B: Performed by: UROLOGY

## 2022-08-09 PROCEDURE — C1894 INTRO/SHEATH, NON-LASER: HCPCS | Performed by: UROLOGY

## 2022-08-09 PROCEDURE — C1769 GUIDE WIRE: HCPCS | Performed by: UROLOGY

## 2022-08-09 PROCEDURE — 77030010103 HC SEAL PRT ENDOSC OCOA -B: Performed by: UROLOGY

## 2022-08-09 PROCEDURE — 74011250636 HC RX REV CODE- 250/636: Performed by: STUDENT IN AN ORGANIZED HEALTH CARE EDUCATION/TRAINING PROGRAM

## 2022-08-09 PROCEDURE — 2709999900 HC NON-CHARGEABLE SUPPLY: Performed by: UROLOGY

## 2022-08-09 PROCEDURE — 74011000250 HC RX REV CODE- 250: Performed by: STUDENT IN AN ORGANIZED HEALTH CARE EDUCATION/TRAINING PROGRAM

## 2022-08-09 PROCEDURE — 77030006974 HC BSKT URET RTVR BSC -C: Performed by: UROLOGY

## 2022-08-09 PROCEDURE — 74420 UROGRAPHY RTRGR +-KUB: CPT

## 2022-08-09 PROCEDURE — 76210000063 HC OR PH I REC FIRST 0.5 HR: Performed by: UROLOGY

## 2022-08-09 PROCEDURE — 77030038846 HC SCPE URETSCP LITHOVUE DISP BSC -H: Performed by: UROLOGY

## 2022-08-09 PROCEDURE — 76010000149 HC OR TIME 1 TO 1.5 HR: Performed by: UROLOGY

## 2022-08-09 PROCEDURE — 74011000258 HC RX REV CODE- 258: Performed by: UROLOGY

## 2022-08-09 PROCEDURE — C1758 CATHETER, URETERAL: HCPCS | Performed by: UROLOGY

## 2022-08-09 PROCEDURE — 74011250636 HC RX REV CODE- 250/636: Performed by: UROLOGY

## 2022-08-09 PROCEDURE — 76060000033 HC ANESTHESIA 1 TO 1.5 HR: Performed by: UROLOGY

## 2022-08-09 PROCEDURE — 77030040361 HC SLV COMPR DVT MDII -B: Performed by: UROLOGY

## 2022-08-09 PROCEDURE — 77030012508 HC MSK AIRWY LMA AMBU -A: Performed by: STUDENT IN AN ORGANIZED HEALTH CARE EDUCATION/TRAINING PROGRAM

## 2022-08-09 PROCEDURE — C2617 STENT, NON-COR, TEM W/O DEL: HCPCS | Performed by: UROLOGY

## 2022-08-09 PROCEDURE — 82360 CALCULUS ASSAY QUANT: CPT

## 2022-08-09 DEVICE — VARIABLE LENGTH URETERAL STENT
Type: IMPLANTABLE DEVICE | Site: URETER | Status: FUNCTIONAL
Brand: CONTOUR VL™

## 2022-08-09 RX ORDER — SODIUM CHLORIDE, SODIUM LACTATE, POTASSIUM CHLORIDE, CALCIUM CHLORIDE 600; 310; 30; 20 MG/100ML; MG/100ML; MG/100ML; MG/100ML
50 INJECTION, SOLUTION INTRAVENOUS CONTINUOUS
Status: DISCONTINUED | OUTPATIENT
Start: 2022-08-09 | End: 2022-08-09 | Stop reason: HOSPADM

## 2022-08-09 RX ORDER — TRAMADOL HYDROCHLORIDE 50 MG/1
50 TABLET ORAL
Qty: 20 TABLET | Refills: 0 | Status: SHIPPED | OUTPATIENT
Start: 2022-08-09 | End: 2022-08-12

## 2022-08-09 RX ORDER — NALBUPHINE HYDROCHLORIDE 10 MG/ML
5 INJECTION, SOLUTION INTRAMUSCULAR; INTRAVENOUS; SUBCUTANEOUS
Status: DISCONTINUED | OUTPATIENT
Start: 2022-08-09 | End: 2022-08-09 | Stop reason: HOSPADM

## 2022-08-09 RX ORDER — FENTANYL CITRATE 50 UG/ML
INJECTION, SOLUTION INTRAMUSCULAR; INTRAVENOUS AS NEEDED
Status: DISCONTINUED | OUTPATIENT
Start: 2022-08-09 | End: 2022-08-09 | Stop reason: HOSPADM

## 2022-08-09 RX ORDER — ONDANSETRON 2 MG/ML
4 INJECTION INTRAMUSCULAR; INTRAVENOUS ONCE
Status: DISCONTINUED | OUTPATIENT
Start: 2022-08-09 | End: 2022-08-09 | Stop reason: HOSPADM

## 2022-08-09 RX ORDER — SODIUM CHLORIDE 0.9 % (FLUSH) 0.9 %
5-40 SYRINGE (ML) INJECTION EVERY 8 HOURS
Status: DISCONTINUED | OUTPATIENT
Start: 2022-08-09 | End: 2022-08-09 | Stop reason: HOSPADM

## 2022-08-09 RX ORDER — AMPICILLIN 500 MG/1
500 CAPSULE ORAL
Qty: 8 CAPSULE | Refills: 0 | Status: SHIPPED | OUTPATIENT
Start: 2022-08-09 | End: 2022-08-11

## 2022-08-09 RX ORDER — ALBUTEROL SULFATE 0.83 MG/ML
2.5 SOLUTION RESPIRATORY (INHALATION)
Status: DISCONTINUED | OUTPATIENT
Start: 2022-08-09 | End: 2022-08-09 | Stop reason: HOSPADM

## 2022-08-09 RX ORDER — EPHEDRINE SULFATE/0.9% NACL/PF 50 MG/5 ML
SYRINGE (ML) INTRAVENOUS AS NEEDED
Status: DISCONTINUED | OUTPATIENT
Start: 2022-08-09 | End: 2022-08-09 | Stop reason: HOSPADM

## 2022-08-09 RX ORDER — ONDANSETRON 2 MG/ML
INJECTION INTRAMUSCULAR; INTRAVENOUS AS NEEDED
Status: DISCONTINUED | OUTPATIENT
Start: 2022-08-09 | End: 2022-08-09 | Stop reason: HOSPADM

## 2022-08-09 RX ORDER — DIPHENHYDRAMINE HYDROCHLORIDE 50 MG/ML
12.5 INJECTION, SOLUTION INTRAMUSCULAR; INTRAVENOUS
Status: DISCONTINUED | OUTPATIENT
Start: 2022-08-09 | End: 2022-08-09 | Stop reason: HOSPADM

## 2022-08-09 RX ORDER — SODIUM CHLORIDE, SODIUM LACTATE, POTASSIUM CHLORIDE, CALCIUM CHLORIDE 600; 310; 30; 20 MG/100ML; MG/100ML; MG/100ML; MG/100ML
125 INJECTION, SOLUTION INTRAVENOUS CONTINUOUS
Status: DISCONTINUED | OUTPATIENT
Start: 2022-08-09 | End: 2022-08-09 | Stop reason: HOSPADM

## 2022-08-09 RX ORDER — FENTANYL CITRATE 50 UG/ML
50 INJECTION, SOLUTION INTRAMUSCULAR; INTRAVENOUS
Status: DISCONTINUED | OUTPATIENT
Start: 2022-08-09 | End: 2022-08-09 | Stop reason: HOSPADM

## 2022-08-09 RX ORDER — PROPOFOL 10 MG/ML
INJECTION, EMULSION INTRAVENOUS AS NEEDED
Status: DISCONTINUED | OUTPATIENT
Start: 2022-08-09 | End: 2022-08-09 | Stop reason: HOSPADM

## 2022-08-09 RX ORDER — LIDOCAINE HYDROCHLORIDE 20 MG/ML
INJECTION, SOLUTION EPIDURAL; INFILTRATION; INTRACAUDAL; PERINEURAL AS NEEDED
Status: DISCONTINUED | OUTPATIENT
Start: 2022-08-09 | End: 2022-08-09 | Stop reason: HOSPADM

## 2022-08-09 RX ORDER — NALOXONE HYDROCHLORIDE 0.4 MG/ML
0.04 INJECTION, SOLUTION INTRAMUSCULAR; INTRAVENOUS; SUBCUTANEOUS
Status: DISCONTINUED | OUTPATIENT
Start: 2022-08-09 | End: 2022-08-09 | Stop reason: HOSPADM

## 2022-08-09 RX ORDER — MIDAZOLAM HYDROCHLORIDE 1 MG/ML
INJECTION, SOLUTION INTRAMUSCULAR; INTRAVENOUS AS NEEDED
Status: DISCONTINUED | OUTPATIENT
Start: 2022-08-09 | End: 2022-08-09 | Stop reason: HOSPADM

## 2022-08-09 RX ORDER — SODIUM CHLORIDE 0.9 % (FLUSH) 0.9 %
5-40 SYRINGE (ML) INJECTION AS NEEDED
Status: DISCONTINUED | OUTPATIENT
Start: 2022-08-09 | End: 2022-08-09 | Stop reason: HOSPADM

## 2022-08-09 RX ORDER — HYDROMORPHONE HYDROCHLORIDE 1 MG/ML
0.5 INJECTION, SOLUTION INTRAMUSCULAR; INTRAVENOUS; SUBCUTANEOUS AS NEEDED
Status: DISCONTINUED | OUTPATIENT
Start: 2022-08-09 | End: 2022-08-09 | Stop reason: HOSPADM

## 2022-08-09 RX ORDER — METOCLOPRAMIDE HYDROCHLORIDE 5 MG/ML
INJECTION INTRAMUSCULAR; INTRAVENOUS AS NEEDED
Status: DISCONTINUED | OUTPATIENT
Start: 2022-08-09 | End: 2022-08-09 | Stop reason: HOSPADM

## 2022-08-09 RX ORDER — DEXAMETHASONE SODIUM PHOSPHATE 4 MG/ML
INJECTION, SOLUTION INTRA-ARTICULAR; INTRALESIONAL; INTRAMUSCULAR; INTRAVENOUS; SOFT TISSUE AS NEEDED
Status: DISCONTINUED | OUTPATIENT
Start: 2022-08-09 | End: 2022-08-09 | Stop reason: HOSPADM

## 2022-08-09 RX ADMIN — SODIUM CHLORIDE 3 G: 900 INJECTION INTRAVENOUS at 07:12

## 2022-08-09 RX ADMIN — DEXAMETHASONE SODIUM PHOSPHATE 4 MG: 4 INJECTION, SOLUTION INTRAMUSCULAR; INTRAVENOUS at 07:12

## 2022-08-09 RX ADMIN — SODIUM CHLORIDE, SODIUM LACTATE, POTASSIUM CHLORIDE, AND CALCIUM CHLORIDE 125 ML/HR: 600; 310; 30; 20 INJECTION, SOLUTION INTRAVENOUS at 08:28

## 2022-08-09 RX ADMIN — METOCLOPRAMIDE 10 MG: 5 INJECTION, SOLUTION INTRAMUSCULAR; INTRAVENOUS at 07:22

## 2022-08-09 RX ADMIN — ONDANSETRON HYDROCHLORIDE 4 MG: 2 INJECTION INTRAMUSCULAR; INTRAVENOUS at 07:12

## 2022-08-09 RX ADMIN — FENTANYL CITRATE 25 MCG: 50 INJECTION, SOLUTION INTRAMUSCULAR; INTRAVENOUS at 07:26

## 2022-08-09 RX ADMIN — FENTANYL CITRATE 25 MCG: 50 INJECTION, SOLUTION INTRAMUSCULAR; INTRAVENOUS at 07:08

## 2022-08-09 RX ADMIN — MIDAZOLAM 4 MG: 1 INJECTION INTRAMUSCULAR; INTRAVENOUS at 06:59

## 2022-08-09 RX ADMIN — FENTANYL CITRATE 25 MCG: 50 INJECTION, SOLUTION INTRAMUSCULAR; INTRAVENOUS at 08:00

## 2022-08-09 RX ADMIN — SODIUM CHLORIDE, SODIUM LACTATE, POTASSIUM CHLORIDE, AND CALCIUM CHLORIDE 125 ML/HR: 600; 310; 30; 20 INJECTION, SOLUTION INTRAVENOUS at 06:23

## 2022-08-09 RX ADMIN — LIDOCAINE HYDROCHLORIDE 60 MG: 20 INJECTION, SOLUTION INTRAVENOUS at 07:04

## 2022-08-09 RX ADMIN — Medication 10 MG: at 07:21

## 2022-08-09 RX ADMIN — PROPOFOL 150 MG: 10 INJECTION, EMULSION INTRAVENOUS at 07:04

## 2022-08-09 NOTE — PERIOP NOTES
TRANSFER - OUT REPORT:    Verbal report given to Xin Blanchard RN(name) on Carley Trejo  being transferred to phase 2(unit) for routine post - op       Report consisted of patients Situation, Background, Assessment and   Recommendations(SBAR). Information from the following report(s) SBAR, OR Summary, Procedure Summary, and MAR was reviewed with the receiving nurse. Lines:   Peripheral IV 08/09/22 Left Hand (Active)   Site Assessment Clean, dry, & intact 08/09/22 0827   Phlebitis Assessment 0 08/09/22 0827   Infiltration Assessment 0 08/09/22 0827   Dressing Status Clean, dry, & intact 08/09/22 0827   Dressing Type Transparent;Tape 08/09/22 0827   Hub Color/Line Status Pink;Patent; Infusing 08/09/22 0622        Opportunity for questions and clarification was provided.       Patient transported with:   Registered Nurse

## 2022-08-09 NOTE — H&P
[facility]  [address] [facility] [place] [address]  Tel: [phone]  Fax: [phone]    Patient : Bob Tidwell   YOB: 1945                       Assessment/Plan  # Detail Type Description    1. Assessment Kidney stone (N20.0). Patient Plan She has a lot of left sided pain. She has a large stone burden in the left lower pole. She will proceed with cysto, left RPG, left URS with Holmium laser litho and stent. Risk of bleeding, infection, injury and possible even persistent pain despite becoming stone free were discussed. Furthermore, we discussed it may require more than one procedure to remain stone free. She will proceed. 2. Assessment Hydronephrosis with ureteral calculous obstruction (N13.2). Patient Plan She has a right sided ureteral stone with hydro as well and some kidney stones. I will likely place a stent on the right side at the time of her left URS. We would then tx her stone at a separate procedure. However, if the left kidney stone is tx'd very easily then I may be able to proceed with treating both side simultaneously. She expressed understanding. The risks are the same, including bleeding, infection, injury and possible need for future procedures to remain stone free were discussed. Additional Visit Information      This 78year old female presents for Hematuria, Kidney and/or Ureteral Stone and Urinary incontinence. History of Present Illness  1. Hematuria   The patient presents with hematuria (gross). The problem began 2 years ago. The symptoms are intermittent. The problem is unchanged. The patient denies pain. Pertinent history includes being at least 38years of age but not history of UTIs and family history of stones or prior prostate surgeries. Denies aggravating factors. Denies relieving factors. The patient also complains of incomplete emptying.  The patient denies chills, dysuria, fever, nausea, urinary dribbling, urinary frequency, straining to urinate and vomiting. Additional information: UCc (3/27/2020) = negative  Cytolgoy (4/2020) = negative  CT (4/2020) - 8mm right renal pelvis stone sand bialteral small lower pole stones    6/25/2020 - She has hematurai yesterday and this mornking and now it is resovled. She has intermittent right flank pain and that is gone as of now too. No fevers or nausea. Comments: 12/2020 -- She is doing well. No gross hematuria lately. No dysuria lately. 3/2021 -- She ahd some gross hematuria today. she stays on xarelto. she ahd some left flank and midline low back pain today    4/15/2021 -- She had 1 day of gross ehmaturia last week. 5/2021 -- with the stent in place she has ongoing hematuria. 7/6/2022 -- She has had multiple UTI's over the past year treated with multiple antibiotics including IV and IM and she has had what she thinks are significant allergies to vancomycin or gentamicin or both. She has been treated by other urologists at [place] [id number] as outpatient is and it is unclear exactly what has transpired. She currently feels terrible but is without fever    07/26/2022:  Patient has not had any gross hematuria with this episode of pain. 8/1/2022 -- No hematuria in the past week. No dysuria    2. Kidney and/or Ureteral Stone   Onset was 31 years ago. Severity level is moderate. It occurs intermittently. The problem is worse. Location of pain is right flank. The pain radiates to the groin. Prior stone type of unknown. Pertinent history includes history of prior stone(s) and past lithotripsy. The patient lists no relieving factors. Associated symptoms include hematuria. Pertinent negatives include chills, constipation, diarrhea, dysuria, fever, nausea, pain, dribbling (urinary), frequency (urinary), urinary straining and vomiting. Additional information: In the past she has had several ESWL and ureteoscopies.     US (10/31/17): 1cm LLP stone and a 5mm RLP stone. 3/16/18 - for the past 5 weeks she has had intermittent right flank pain and nausea. CT (3/14/18) - 4x6mm RLP stone and 8mm LLP stone and a 4x6mm right distal ureteral stone (645 HU). Comments: 4/4/18 -- she had a left ureteroscopy for a distal ureteral stone. The stone was fragmented and removed on 3/23/18. Her stetn was removed on 3/26/18.  within a day her pain worsened. She was taken to the OR by Dr Ifeanyi Nixon on 3/30/18 but DID NOT have a ureteral stone and her known lower pole renal stone was treated only. 7/12/18 -- She feels fine w/o any pain or problems or radiation or aggravating factors. no hematuria. no dysuria. KUB - small RLP stone and 6mm LLP stone. metabolic evaluation: normal serum studie;  24-hour urine tests: UOP>2L,  high urine calcium in 1 test and low urinary citrate in 1 test.    1/16/19 - She is doing poorly with migraines lately. Almost debilitating. She is considering a change in medication -- possibly Topamax. No flank pain or radiation or hematuria. It is just her migraines that are keeping her at [place] [id number]. 1/20/2020 -- She continues to gt migraines and continues to be evaluated. No sotne pain or radiation . KUB - bialteral lower pole stones at 8mm is largest.    3/30/2020 -- She dos take Beth Dwight 54. she had gross hematuria. No pain or radiation. 4/15/2020 -- CT shwoed bialteral small stones and a right 8mm renal pelvis stone    6/25/2020 - In the last day she ahs had intermittent rght flank pain. Nothing is there now. No dysuria or urgency or frequency. no radiation of the pain. no aggravating issues. -- it comes and goes. she feels very normal now, but 5 hous ago pain was a 3 out of 10.    12/2020 -- No  new flank pain or nausea. no aggravating issues. she tries to hydrate well. No pain at all.    3/2021 -- she has had intermittent Left flank and midline low back pain for 5 days, but it is better this afternoon. KUB - unchanged bilateral lower pole renal stones and no ureteral stone. 4/15/2021 -- She had right upper quadrant pain just underneath the ribs and it radiated to the right flank. No fevers. She ahs some issues of low Mg recently   Seh took  a tramadol and went to sleep and now feels better and no pain today -- but ti was awful yesterday      5/19/2021 -- she is 1 week status post ureteroscopy and laser lithotripsy of a significant volume of right renal stones. She feels a lot better with less discomfort although she still has some related to the stent. She was given Levaquin intraoperatively IV as antibiotic prophylaxis because her Cipro allergy is simply GI distress. She tolerated that fine. However she was sent home with Levaquin as antibiotic prophylaxis and had problems of feeling like her throat was closing after the 2nd dose    7/6/2022 --  she had an ESWL in 2/2022 for right renal stones at [facility]. She is not having any right sided pain.  + left sided pain for 1 pain and nasuea. 07/26/2022:  Patient with a 3 week onset of left-sided flank pain that she rates a 10/10 intermittently. Patient does have a history of back pain. She is taking tramadol for the pain. She has had some nausea but no vomiting. Denies fever and chills. Patient recently had CT scan done on July 7, 2022 ordered by Dr. Godfrey Oseguera and this showed a 4 x 3 x 5 mm calculus with a Hounsfield units measurement of 419 and is approximately 2.2 cm from the ureteral vesicular junction, resulting in mild dilatation of the medially upstream right ureter but no overt hydronephrosis. She has a 9 x 6 x 13 mm nonobstructing stone in the lower pole of the left kidney which measures 1401 Hounsfield units. She has 2 subtle punctate nonobstructing stones in the lower pole of the right kidney also. 8/1/2022 -- She still has left sided pain all of the time. Unrelated toactivity. no dysuria.     CT (7/29/2022) personally reviewed =   JIMENA 4 x 3 x 5 mm calculus is redemonstrated in the distal right   ureter without significant change in position. There is improved dilatation of the   immediately upstream right ureter relative to the prior study, and there is no right   hydronephrosis. There are at least 2 nonobstructing calculi in the lower pole of the   right kidney, the largest measuring up to 3 mm. A 9 x 6 x 13 mm nonobstructing calculus   is redemonstrated in the lower pole of the left kidney. 3.  Urinary incontinence   Onset was gradual. Severity level is mild-moderate. It occurs occasionally. The problem is with no change. Associated symptoms include hematuria and incomplete emptying. Pertinent negatives include chills, constipation, dysuria, fever, dribbling, urinary frequency and urinary straining. Additional information: She ahs some KYRIE that is new and she wears a pad when she goes out only. .          Past Medical/Surgical History   (Reviewed, updated)  Disease/disorder Onset Date Management Date Comments     Cysto, Right RPG with interpretation, right ureteroscopy with laser lithotripsy and stent 05/11/2021      Hysterectomy, total 2017      D&C 2017      trigger finger surgery 2005      Cholecystectomy 1996      Appendectomy 1980    Osteoarthritis         uterine suspension     Allergies       Post Menopausal       GERD         Arthroscopy, knee       cataract extraction and lens implantation     Hyperlipidemia       KUB 9/12/13 ([facility])       Hx Renal Calculi       Urolithiasis         lithotripsy feb 15         Gynecologic History  Patient is postmenopausal.     Problem List  Problem Description Onset Date Chronic Clinical Status Notes   Osteoporosis 05/27/2014 Y     Arthropathy 05/27/2014 Y     Hyperlipidemia 06/07/2011 Y     Neoplasm of uncertain behavior of skin 04/30/2012 Y     Gastroesophageal reflux disease 06/07/2011 Y     Anxiety disorder 06/07/2011 Y     Disorder of bone and articular cartilage 06/07/2011 Y     Insomnia 06/07/2011 Y       Medications (active prior to today)  Medication Instructions Start Date Stop Date Refilled Elsewhere   Centrum Silver Women 8 mg iron-400 mcg-300 mcg tablet take one tablet by oral route daily 09/11/2016   N   CALCIUM + VIT D otc //   Y   Probiotic 10 billion cell capsule take 1 tablet by oral route  every day 09/06/2017   N   azelastine 0.15 % (205.5 mcg) nasal spray spray 1 spray by intranasal route 2 times every day in each nostril 02/01/2021 02/01/2021 N   metoprolol tartrate 50 mg tablet take 1 tablet po twice daily 03/02/2021 03/02/2021 N   potassium chloride ER 10 mEq capsule,extended release take 7 capsule by oral route  every day with food 03/02/2021 03/02/2021 N   spironolactone 25 mg tablet take 1 tablet by oral route  every day //   Y   Cardizem  mg capsule,extended release take 1 capsule by oral route  every day 06/02/2021   N   magnesium 400 mg (as magnesium oxide) tablet  06/02/2021   N   pantoprazole 40 mg tablet,delayed release take 1 tablet by oral route  every day 07/06/2021 07/06/2021 N   atorvastatin 10 mg tablet take 1 tablet by oral route  every day 11/01/2021 11/01/2021 N   Xarelto 20 mg tablet take 1 tablet by oral route  every day with the evening meal 01/07/2022 01/07/2022 N   cetirizine 10 mg tablet take 1 tablet by ORAL route  every bedtime 01/07/2022 01/07/2022 N   fluticasone propionate 50 mcg/actuation nasal spray,suspension spray 1 spray by intranasal route  every day in each nostril 01/07/2022   N   Valtrex 1 gram tablet take 2 Tablet by oral route  every 12 hours 02/25/2022   N   Lunesta 2 mg tablet take 1 tablet (2MG)  by oral route  every day at bedtime 05/18/2022 05/18/2022 N   dicyclomine 20 mg tablet take 1 tablet by oral route 4 times every day 07/12/2022 07/12/2022 N   tramadol 50 mg tablet take 1 tablet by oral route  every 6 hours as needed as needed for Agitation 07/14/2022 07/14/2022 N   clonazepam 1 mg tablet take 1 tablet by oral route  every day 2022 N   tamsulosin 0.4 mg capsule take 1 capsule by oral route  every day 1/2 hour following the same meal each day 2022   N   tramadol 50 mg tablet take 1 tablet by oral route  every 6 hours as needed 2022 N       Medication Reconciliation  Medications reconciled today. Allergies  Ingredient Reaction (Severity) Medication Name Comment   ACETAMINOPHEN  PERCOCET    ACETAMINOPHEN   Vicodin   ACETAMINOPHEN   Tylenol-Codeine   ASPIRIN      CEPHALEXIN MONOHYDRATE   Keflex   CIPROFLOXACIN      CODEINE PHOSPHATE   Tylenol-Codeine   DENOSUMAB  Prolia    DOXYCYCLINE hives     ERYTHROMYCIN BASE      FORMALDEHYDE      GABAPENTIN      GENTAMICIN      GOLD SODIUM THIOMALATE      HYDROCODONE BITARTRATE   Vicodin   LEVOFLOXACIN      LEVOFLOXACIN throat swelling (severe) Levaquin    MEPERIDINE HCL   Demerol   OXYCODONE HCL  PERCOCET    POTASSIUM CLAVULANATE   AUGMENTIN   SULFANILAMIDE      TETRACYCLINE Hives/Skin Rash     VANCOMYCIN        Reviewed, no changes. Family History   (Reviewed, updated)    Relationship Family Member Name  Age at Death Condition Onset Age Cause of Death       Family history of Thyroid disorder  N   Father  Y    N   Father    Hypertension  N   Father  Y  Cancer, lung  Y   Mother    Heart disease  N   Mother    Alzheimer's Disease  N   Mother    Thyroid disorder  N   Mother    Congestive heart failure  N   Sister    Osteoarthritis  N   Sister    Cancer, lung  N   Sister    Crohn's disease  N   Sister    Hyperthyroidism  N   Sister    Colon polyps  N   Sister    Cancer  N   Sister    Thyroid disorder  N   Sister    Depression  N     Social History  (Reviewed, updated)  Tobacco use reviewed. The patient is right-handed. Preferred language is [place]. Marital Status/Family/Social Support  Marital status:      Tobacco use status: Never smoked tobacco.    Smoking status: Never smoker.     Tobacco Screening  Patient has never used tobacco. Patient has not used tobacco in the last 30 days. Patient has not used smokeless tobacco in the last 30 days. Smoking Status  Type Smoking Status Usage Per Day Years Used Pack Years Total Pack Years    Never smoker         Tobacco/Vaping Exposure  No passive smoke exposure. Alcohol  There is no history of alcohol use. Caffeine  The patient uses caffeine: tea and soda. - 3 cups a day. Lifestyle  light activity level. Never exercises. Diet  healthy. Zoroastrian/Spiritual  Patient agrees to transfusion. Review of Systems  System Neg/Pos Details   Constitutional Negative Chills, Fever and Pain. ENMT Negative Ear infections and Sore throat. Eyes Negative Blurred vision, Double vision and Eye pain. Respiratory Negative Asthma, Chronic cough, Dyspnea and Wheezing. Cardio Negative Chest pain. GI Negative Constipation, Decreased appetite, Diarrhea, Nausea and Vomiting.  Positive Hematuria, Incomplete emptying.  Negative Dysuria, Urinary dribbling, Urinary frequency and Urinary straining. Endocrine Negative Cold intolerance, Heat intolerance, Increased thirst and Weight loss. Neuro Negative Headache and Tremors. Psych Negative Anxiety and Depression. Integumentary Negative Itching skin and Rash. MS Negative Back pain and Joint pain. Hema/Lymph Negative Easy bleeding. Reproductive Positive The patient is post-menopausal.       Vital Signs   Gynecologic History  Patient is postmenopausal.      Height  Time ft in cm Last Measured Height Position   8:30 AM 5.0 6.00 167.64 03/17/2022 Standing     Weight/BSA/BMI  Time lb oz kg Context BMI kg/m2 BSA m2   8:30 .00  68.946  24.53      Measured by  Time Measured by   8:30 AM Alex Casiano     Physical Exam  Exam Findings Details   Constitutional Normal Well developed.    Neck Exam Normal Inspection - Normal.   Respiratory Normal Inspection - Normal.   Abdomen Normal No abdominal tenderness. Genitourinary Normal No Suprapubic tenderness. No CVA tenderness. Extremity Normal No Edema. Psychiatric Normal Orientation - Oriented to time, place, person & situation. Appropriate mood and affect.      Immunizations Entered by History  Date Immunization   10/19/2021 12:00:00 AM SARS-COV-2 (COVID-19) vaccine, mRNA, spike protein, LNP, preservative free, 30 mcg/0.3mL dose   3/12/2021 12:00:00 AM SARS-COV-2 (COVID-19) vaccine, mRNA, spike protein, LNP, preservative free, 30 mcg/0.3mL dose   2/19/2021 12:00:00 AM SARS-COV-2 (COVID-19) vaccine, mRNA, spike protein, LNP, preservative free, 30 mcg/0.3mL dose   6/7/2011 12:00:00 AM Tdap (Adacel r)   Medications (added, continued, or stopped today)  Start Date Medication Directions PRN Status PRN Reason Instruction Stop Date   11/01/2021 atorvastatin 10 mg tablet take 1 tablet by oral route  every day N      02/01/2021 azelastine 0.15 % (205.5 mcg) nasal spray spray 1 spray by intranasal route 2 times every day in each nostril N       CALCIUM + VIT D otc N      06/02/2021 Cardizem  mg capsule,extended release take 1 capsule by oral route  every day N      09/11/2016 Centrum Silver Women 8 mg iron-400 mcg-300 mcg tablet take one tablet by oral route daily N      01/07/2022 cetirizine 10 mg tablet take 1 tablet by ORAL route  every bedtime N      07/14/2022 clonazepam 1 mg tablet take 1 tablet by oral route  every day N      07/12/2022 dicyclomine 20 mg tablet take 1 tablet by oral route 4 times every day N      01/07/2022 fluticasone propionate 50 mcg/actuation nasal spray,suspension spray 1 spray by intranasal route  every day in each nostril N      05/18/2022 Lunesta 2 mg tablet take 1 tablet (2MG)  by oral route  every day at bedtime N      06/02/2021 magnesium 400 mg (as magnesium oxide) tablet  N      03/02/2021 metoprolol tartrate 50 mg tablet take 1 tablet po twice daily N      07/06/2021 pantoprazole 40 mg tablet,delayed release take 1 tablet by oral route  every day N      03/02/2021 potassium chloride ER 10 mEq capsule,extended release take 7 capsule by oral route  every day with food N      09/06/2017 Probiotic 10 billion cell capsule take 1 tablet by oral route  every day N      08/01/2022 promethazine 12.5 mg tablet take 1 tablet by oral route 3 times every day as needed with nausea N   11/16/2022    spironolactone 25 mg tablet take 1 tablet by oral route  every day N      07/26/2022 tamsulosin 0.4 mg capsule take 1 capsule by oral route  every day 1/2 hour following the same meal each day N      07/26/2022 tramadol 50 mg tablet take 1 tablet by oral route  every 6 hours as needed N   08/01/2022 08/01/2022 tramadol 50 mg tablet take 1 tablet by oral route  every 6 hours as needed N      07/14/2022 tramadol 50 mg tablet take 1 tablet by oral route  every 6 hours as needed as needed for Agitation Y Agitation     02/25/2022 Valtrex 1 gram tablet take 2 Tablet by oral route  every 12 hours N      01/07/2022 Xarelto 20 mg tablet take 1 tablet by oral route  every day with the evening meal N        Prescription Drug Monitoring Report: Accessed by Surinder Rosenberg MD on 8/1/2022 9:04:14 AM    Active Patient Care Team Members  Name Contact Agency Type Support Role Relationship Active Date Inactive Date Specialty    Олег   specialist    Ophthalmology   Rosalina Farias 19   Patient provider PCP   Abdoul Howell   primary care provider       Surinder Rosenberg   encounter provider    Urology   Salbador Thompson   allergy specialist       Irven Boast   primary care provider       Gail Boyle   encounter provider    Urology   Rowena Henry   specialist    Urology   Alisa Greer   encounter provider    Endocrinology   [facility]   specialist    Dermatology   Kim Umanzor    Spouse      Meliton Rosario   encounter provider    Gastroenterology   [facility]   specialist    Gastroenterology   Karis Soriano   encounter provider    Gastroenterology       Provider:        Gabino Julien MD

## 2022-08-09 NOTE — BRIEF OP NOTE
Brief Postoperative Note    Patient: Jessica Vazquez  YOB: 1945  MRN: [id number]    Date of Procedure: 8/9/2022     Pre-Op Diagnosis: LEFT RENAL STONE,  RIGHT HYDRONEPHROSIS WITH URETERAL STONE OBSTRUCTION    Post-Op Diagnosis: Same as preoperative diagnosis. Procedure(s):  CYSTOSCOPY, LEFT RETROGRADE PYELOGRAM WITH INTERPRETATION, LEFT URETEROSCOPY LASER LITHOTRIPSY WITH HOLMIUM, LEFT STENT PLACEMENT, RIGHT RETROGRADE PYELOGRAM, WITH INTERPRETATION RIGHT URETEROSCOPY LASER LITHOTRIPSY WITH HOLMIUM, RIGHT STENT PLACEMENT WITH C-ARM    Surgeon(s):  Janey Quach MD    Surgical Assistant: NONE    Anesthesia: GENERAL    Estimated Blood Loss (mL): Minimal    Complications: None    Specimens:   ID Type Source Tests Collected by Time Destination   1 : bilateral kidney stones [facility]  Janey Quach MD 8/9/2022 4252 Pathology        Implants:   Implant Name Type Inv. Item Serial No.  Lot No. LRB No. Used Action   STENT URET 4.8FR [id number] PERCFLX HYDR+ SFT PGTL TAPR TIP - [id number]  STENT URET 4.8FR [id number] PERCFLX HYDR+ SFT PGTL TAPR TIP  [employer] [id number] Left 1 Implanted   STENT URET 6FR [id number] PERCFLX HYDR+ TAPR TIP GRAD - [id number]  STENT URET 6FR [id number] PERCFLX HYDR+ TAPR TIP GRAD  [employer] [id number] Right 1 Implanted       Drains: * No LDAs found *    Findings: HARD / DENSE LEFT LOWER POLE STONE DIFFICULT TO FRAGMENT WAS BROKEN INTO SMALLER PIECES AND EXTRACTED. THE RIGHT URETERAL STONE WAS SMALL, BUT HE DISTAL URETER WAS Jalaine Bough.    IT WAS LASERED AND EXTRACTED    Electronically Signed by Ron Domínguez MD on 8/9/2022 at 8:25 AM

## 2022-08-09 NOTE — OP NOTES
[facility]  OPERATIVE REPORT    Name:  Jeanna Salgado  MR#:   [id number]  :  1945  ACCOUNT #:  [de-identified]  DATE OF SERVICE:  2022    PREOPERATIVE DIAGNOSES:  Left renal stone and the right ureteral stone with hydronephrosis. POSTOPERATIVE DIAGNOSES:  Left renal stone and the right ureteral stone with hydronephrosis. PROCEDURE PERFORMED:  Cystoscopy, left retrograde pyelogram with interpretation, left ureteroscopy with holmium laser lithotripsy of left renal stone and left stent placement, right retrograde pyelogram with interpretation, right ureteroscopy with holmium laser lithotripsy of right ureteral stone and right stent placement. SURGEON:  Ron Domínguez MD    ASSISTANT:  None. ANESTHESIA:  General.    COMPLICATIONS:  None. SPECIMENS REMOVED:  Bilateral kidney stones. IMPLANTS:  A left 4.8-South Sudanese variable-length stent and a right 6-[place]ese variable-length stent. DRAINS:  None. ESTIMATED BLOOD LOSS:  Minimal.    FINDINGS:  The patient had a hard, dense left lower pole stone that was difficult to fragment, but it was broken into smaller pieces and extracted. The right ureteral stone was small and fragmented easily and extracted. The distal ureter was quite narrow. INDICATIONS:  The patient is a 68-year-old female with a history of stones who has been in pain for last couple of months, mostly on the left, but she is found to have bilateral stones with her right side being obstructing. She presents for ureteroscopy. PROCEDURE:  Preoperatively, the risks and benefits of surgery were described to the patient. The risks include, but are not limited to, bleeding, infection, injury to the bladder, injury to the ureter, injury to the kidney, possible need for future procedure to be made stone-free. The patient understood the risks and signed the informed consent. The patient was taken to the operating room, placed on the OR table in supine position.   She was administered general anesthetic. She was administered intravenous antibiotics. She was placed in dorsal lithotomy position, prepped and draped in the usual sterile manner. A 22-Greenlandic cystoscope and sheath were then inserted transurethrally, atraumatically under direct vision using a 30-degree lens. Panendoscopy was done. Bilateral ureteral orifices were in normal anatomic position. There were no stones, no tumors, no areas of concern within the bladder. I cannulated the left ureteral orifice with a 5-Greenlandic open-ended ureteral catheter. Retrograde pyelogram was done in the usual manner using 10 mL of Visipaque dye. There were no filling defects in the ureter. Up in the kidney, there was a filling defect in the lower pole consistent with stone. There was no hydronephrosis or blunting of calyces. Next, I passed a Sensor wire through the open-ended catheter up to the kidney. The open-ended catheter was removed. The scope was removed. Then, passed a dual-lumen catheter, passed a second Sensor wire through the kidney. The dual-lumen catheter was removed. One wire was a safety wire and the other wire was a working wire. Over the working wire, I passed an 11/13, 36-cm Navigator sheath up the ureter without difficulty into the proximal ureter. I then removed the working wire and the inner sheath. I then passed a flexible ureteroscope through the sheath up to the kidney. Panpyeloscopy was done. Every izaiah was entered. There was a Brendan's plaque that was lasered. In the lower pole, there was a large stone. Using a 272-micron holmium laser fiber, I broke it up into smaller pieces, extracted them out. I then took a larger piece and put it up into the upper pole where it was easier to get to. Using the 272-micron holmium laser fiber, I broke up that into smaller pieces and extracted all I could. By the end, there was just small dust and debris. I then looked around every izaiah.   There were no significant stone fragments anywhere. I then slowly backed the scope and the sheath down the ureter. The ureter was intact and unharmed, and there no fragments along the length of the ureter. Next, I reinserted the cystoscope. Over the safety wire, I passed a 4.8-Colombian variable-length stent on. The wire was removed. Fluoroscopy confirmed the proximal coil of the stent within the left kidney. The distal coil was noted to be in the bladder by direct vision. Next, I cannulated the right ureteral orifice with a 5-Colombian open-ended ureteral catheter. Retrograde pyelogram was done in the usual manner using 10 mL of Visipaque dye. There was a filling defect in the distal ureter consistent with stone. Proximal to this, there were some mild hydroureter. The hydroureter improved as it went to the proximal ureter. No other filling defects were seen. Up in the kidney, there appeared to be mild hydronephrosis secondary to a mild UPJ obstruction. There were no filling defects or blunting of calyces up in the kidney. Next, I passed a Sensor wire through the open-ended catheter up to the kidney. The open-ended catheter was removed. The scope was removed. I then passed a semi-rigid ureteroscope transurethrally atraumatically into the bladder. I cannulated the right ureteral orifice. The right distal ureter was very tight, but I was able to get my scope through. I immediately came upon the stone. The stone was big enough that under normal circumstances, I should have been able to get it through, but too big for the distal ureter being so tight here. Using a 272-micron holmium fiber, I broke up into a few small fragments. I then extracted the fragments with a Zero Tip basket without difficulty. I then reinserted the cystoscope after the ureteroscope was removed. Over the safety wire, I passed a 6-Colombian variable-length stent. The wire was removed.   Fluoroscopy confirmed the proximal coil was in the kidney and the distal coil was noted to be in the bladder by direct vision. At this point, the patient was taken out of lithotomy position, revived from anesthesia, taken to Recovery in stable condition.       MD BERNARDO [id number]  D:  08/09/2022 8:32  T:  08/09/2022 8:59  JOB #:  [id number]

## 2022-08-09 NOTE — ANESTHESIA POSTPROCEDURE EVALUATION
Post-Anesthesia Evaluation and Assessment    Cardiovascular Function/Vital Signs  Visit Vitals  BP (!) 103/57   Pulse 65   Temp 36.2 °C (97.1 °F)   Resp 15   Ht 5' 6\" (1.676 m)   Wt 69.9 kg (154 lb 1.6 oz)   SpO2 98%   BMI 24.87 kg/m²       Patient is status post Procedure(s):  CYSTOSCOPY, LEFT RETROGRADE PYELOGRAM, LEFT URETEROSCOPY LASER LITHOTRIPSY WITH HOLMIUM, LEFT STENT PLACEMENT, RIGHT RETROGRADE PYELOGRAM, RIGHT URETEROSCOPY LASER LITHOTRIPSY WITH HOLMIUM, RIGHT STENT PLACEMENT WITH C-ARM. Nausea/Vomiting: Controlled. Postoperative hydration reviewed and adequate. Pain:  Pain Scale 1: Numeric (0 - 10) (08/09/22 0844)  Pain Intensity 1: 0 (08/09/22 0844)   Managed. Neurological Status:   Neuro (WDL): Exceptions to WDL (08/09/22 0845)   At baseline. Mental Status and Level of Consciousness: Baseline and appropriate for discharge. Pulmonary Status:   O2 Device: None (Room air) (08/09/22 0845)   Adequate oxygenation and airway patent. Complications related to anesthesia: None    Post-anesthesia assessment completed. No concerns. Patient has met all discharge requirements.     Signed By: Nicko Childs MD    August 9, 2022

## 2022-08-09 NOTE — INTERVAL H&P NOTE
Update History & Physical    The Patient's History and Physical of August 8, 2022 was reviewed with the patient and I examined the patient. There was no change. The surgical site was confirmed by the patient and me. Plan:  The risk, benefits, expected outcome, and alternative to the recommended procedure have been discussed with the patient. Patient understands and wants to proceed with the procedure.     Electronically signed by Mary Ingram MD on 8/9/2022 at 6:58 AM

## 2022-08-09 NOTE — DISCHARGE INSTRUCTIONS
DISCHARGE SUMMARY from Nurse    PATIENT INSTRUCTIONS:    After general anesthesia or intravenous sedation, for 24 hours or while taking prescription Narcotics:  Limit your activities  Do not drive and operate hazardous machinery  Do not make important personal or business decisions  Do  not drink alcoholic beverages  If you have not urinated within 8 hours after discharge, please contact your surgeon on call. Report the following to your surgeon:  Excessive pain, swelling, redness or odor of or around the surgical area  Temperature over 100.5  Nausea and vomiting lasting longer than 4 hours or if unable to take medications  Any signs of decreased circulation or nerve impairment to extremity: change in color, persistent  numbness, tingling, coldness or increase pain  Any questions    What to do at Home:  Recommended activity:   REGULAR DIET DRINK LOTS OF LIQUIDS  OK TO SHOWER  DR Amy Nagy Se A POST OP CHECK UP    If you experience any of the following symptoms heavy bleeding, fevers, unable to void, severe pain , please follow up with dr Reinaldo Estrada    *  Please give a list of your current medications to your Primary Care Provider. *  Please update this list whenever your medications are discontinued, doses are      changed, or new medications (including over-the-counter products) are added. *  Please carry medication information at all times in case of emergency situations. These are general instructions for a healthy lifestyle:    No smoking/ No tobacco products/ Avoid exposure to second hand smoke  Surgeon General's Warning:  Quitting smoking now greatly reduces serious risk to your health.     Obesity, smoking, and sedentary lifestyle greatly increases your risk for illness    A healthy diet, regular physical exercise & weight monitoring are important for maintaining a healthy lifestyle    You may be retaining fluid if you have a history of heart failure or if you experience any of the following symptoms:  Weight gain of 3 pounds or more overnight or 5 pounds in a week, increased swelling in our hands or feet or shortness of breath while lying flat in bed. Please call your doctor as soon as you notice any of these symptoms; do not wait until your next office visit. The discharge information has been reviewed with the patient and caregiver. The patient and caregiver verbalized understanding. Discharge medications reviewed with the patient and caregiver and appropriate educational materials and side effects teaching were provided.   ___________________________________________________________________________________________________________________________________    Patient armband removed and shredded

## 2022-08-09 NOTE — PERIOP NOTES
Dr. Roberto Monae reviewed pt's allergies and reactions. Informed pt stated she is not allergic to PCN and it was verified via testing.

## 2022-08-09 NOTE — PERIOP NOTES
Reviewed PTA medication list with patient/caregiver and patient/caregiver denies any additional medications. Patient admits to having a responsible adult care for them at home for at least 24 hours after surgery. Patient encouraged to use gown warming system and informed that using said warming gown to regulate body temperature prior to a procedure has been shown to help reduce the risks of blood clots and infection. Patient's pharmacy of choice verified and documented in PTA medication section. Dual skin assessment & fall risk band verification completed with Niesha ESCOBAR.

## 2022-08-13 LAB
CALCIUM OXALATE DIHYDRATE MFR STONE IR: 40 %
COLOR STONE: NORMAL
COM MFR STONE: 50 %
COMMENT, 120677: NORMAL
COMMENT, 519994: NORMAL
COMPOSITION, 120440: NORMAL
DISCLAIMER, STO32L: NORMAL
HYDROXYAPATITE: 10 %
PHOTO, 120675: NORMAL
PLEASE NOTE, 130422: NORMAL
SIZE STONE: NORMAL MM
SPECIMEN SOURCE: NORMAL
WT STONE: 90 MG

## 2023-02-01 ENCOUNTER — TRANSCRIBE ORDER (OUTPATIENT)
Dept: SCHEDULING | Age: 78
End: 2023-02-01

## 2023-02-01 DIAGNOSIS — R13.10 DYSPHAGIA: Primary | ICD-10-CM

## 2023-02-07 ENCOUNTER — HOSPITAL ENCOUNTER (OUTPATIENT)
Dept: GENERAL RADIOLOGY | Age: 78
Discharge: HOME | End: 2023-02-07
Attending: INTERNAL MEDICINE
Payer: MEDICARE

## 2023-02-07 DIAGNOSIS — R13.10 DYSPHAGIA: ICD-10-CM

## 2023-02-07 PROCEDURE — 74011000250 HC RX REV CODE- 250: Performed by: INTERNAL MEDICINE

## 2023-02-07 PROCEDURE — 74220 X-RAY XM ESOPHAGUS 1CNTRST: CPT

## 2023-02-07 RX ADMIN — BARIUM SULFATE 50 G: 960 POWDER, FOR SUSPENSION ORAL at 10:35

## 2023-06-02 ENCOUNTER — ANESTHESIA EVENT (OUTPATIENT)
Facility: HOSPITAL | Age: 78
End: 2023-06-02
Payer: MEDICARE

## 2023-06-02 ENCOUNTER — ANESTHESIA (OUTPATIENT)
Facility: HOSPITAL | Age: 78
End: 2023-06-02
Payer: MEDICARE

## 2023-06-02 ENCOUNTER — HOSPITAL ENCOUNTER (OUTPATIENT)
Facility: HOSPITAL | Age: 78
Setting detail: OUTPATIENT SURGERY
Discharge: HOME | End: 2023-06-02
Attending: INTERNAL MEDICINE | Admitting: INTERNAL MEDICINE
Payer: MEDICARE

## 2023-06-02 VITALS
OXYGEN SATURATION: 98 % | BODY MASS INDEX: 23.46 KG/M2 | SYSTOLIC BLOOD PRESSURE: 127 MMHG | TEMPERATURE: 97.7 F | DIASTOLIC BLOOD PRESSURE: 58 MMHG | HEART RATE: 62 BPM | RESPIRATION RATE: 16 BRPM | HEIGHT: 66 IN | WEIGHT: 146 LBS

## 2023-06-02 PROCEDURE — 3600007502: Performed by: INTERNAL MEDICINE

## 2023-06-02 PROCEDURE — 7100000010 HC PHASE II RECOVERY - FIRST 15 MIN: Performed by: INTERNAL MEDICINE

## 2023-06-02 PROCEDURE — 2580000003 HC RX 258: Performed by: INTERNAL MEDICINE

## 2023-06-02 PROCEDURE — 2500000003 HC RX 250 WO HCPCS: Performed by: NURSE ANESTHETIST, CERTIFIED REGISTERED

## 2023-06-02 PROCEDURE — 7100000011 HC PHASE II RECOVERY - ADDTL 15 MIN: Performed by: INTERNAL MEDICINE

## 2023-06-02 PROCEDURE — 6360000002 HC RX W HCPCS: Performed by: NURSE ANESTHETIST, CERTIFIED REGISTERED

## 2023-06-02 PROCEDURE — 3600007512: Performed by: INTERNAL MEDICINE

## 2023-06-02 PROCEDURE — 3700000001 HC ADD 15 MINUTES (ANESTHESIA): Performed by: INTERNAL MEDICINE

## 2023-06-02 PROCEDURE — 2709999900 HC NON-CHARGEABLE SUPPLY: Performed by: INTERNAL MEDICINE

## 2023-06-02 PROCEDURE — 3700000000 HC ANESTHESIA ATTENDED CARE: Performed by: INTERNAL MEDICINE

## 2023-06-02 RX ORDER — FLUMAZENIL 0.1 MG/ML
0.2 INJECTION INTRAVENOUS ONCE
Status: DISCONTINUED | OUTPATIENT
Start: 2023-06-02 | End: 2023-06-02 | Stop reason: HOSPADM

## 2023-06-02 RX ORDER — ATROPINE SULFATE 0.4 MG/ML
0.4 INJECTION, SOLUTION INTRAVENOUS ONCE
Status: DISCONTINUED | OUTPATIENT
Start: 2023-06-02 | End: 2023-06-02 | Stop reason: HOSPADM

## 2023-06-02 RX ORDER — SODIUM CHLORIDE 9 MG/ML
INJECTION, SOLUTION INTRAVENOUS CONTINUOUS
Status: DISCONTINUED | OUTPATIENT
Start: 2023-06-02 | End: 2023-06-02 | Stop reason: HOSPADM

## 2023-06-02 RX ORDER — MIDAZOLAM HYDROCHLORIDE 1 MG/ML
5 INJECTION, SOLUTION INTRAMUSCULAR; INTRAVENOUS
Status: DISCONTINUED | OUTPATIENT
Start: 2023-06-02 | End: 2023-06-02 | Stop reason: HOSPADM

## 2023-06-02 RX ORDER — SODIUM CHLORIDE 9 MG/ML
INJECTION, SOLUTION INTRAVENOUS CONTINUOUS
Status: CANCELLED | OUTPATIENT
Start: 2023-06-02

## 2023-06-02 RX ORDER — DIPHENHYDRAMINE HYDROCHLORIDE 50 MG/ML
25 INJECTION INTRAMUSCULAR; INTRAVENOUS EVERY 6 HOURS PRN
Status: DISCONTINUED | OUTPATIENT
Start: 2023-06-02 | End: 2023-06-02 | Stop reason: HOSPADM

## 2023-06-02 RX ORDER — GLYCOPYRROLATE 0.2 MG/ML
0.1 INJECTION INTRAMUSCULAR; INTRAVENOUS ONCE
Status: DISCONTINUED | OUTPATIENT
Start: 2023-06-02 | End: 2023-06-02 | Stop reason: HOSPADM

## 2023-06-02 RX ORDER — PROPOFOL 10 MG/ML
INJECTION, EMULSION INTRAVENOUS PRN
Status: DISCONTINUED | OUTPATIENT
Start: 2023-06-02 | End: 2023-06-02 | Stop reason: SDUPTHER

## 2023-06-02 RX ORDER — NALOXONE HYDROCHLORIDE 0.4 MG/ML
0.4 INJECTION, SOLUTION INTRAMUSCULAR; INTRAVENOUS; SUBCUTANEOUS PRN
Status: DISCONTINUED | OUTPATIENT
Start: 2023-06-02 | End: 2023-06-02 | Stop reason: HOSPADM

## 2023-06-02 RX ORDER — LIDOCAINE HYDROCHLORIDE 20 MG/ML
INJECTION, SOLUTION EPIDURAL; INFILTRATION; INTRACAUDAL; PERINEURAL PRN
Status: DISCONTINUED | OUTPATIENT
Start: 2023-06-02 | End: 2023-06-02 | Stop reason: SDUPTHER

## 2023-06-02 RX ORDER — EPINEPHRINE IN SOD CHLOR,ISO 1 MG/10 ML
1 SYRINGE (ML) INTRAVENOUS ONCE
Status: DISCONTINUED | OUTPATIENT
Start: 2023-06-02 | End: 2023-06-02 | Stop reason: HOSPADM

## 2023-06-02 RX ORDER — SIMETHICONE 20 MG/.3ML
40 EMULSION ORAL EVERY 6 HOURS PRN
Status: DISCONTINUED | OUTPATIENT
Start: 2023-06-02 | End: 2023-06-02 | Stop reason: HOSPADM

## 2023-06-02 RX ADMIN — PROPOFOL 25 MG: 10 INJECTION, EMULSION INTRAVENOUS at 16:45

## 2023-06-02 RX ADMIN — LIDOCAINE HYDROCHLORIDE 60 MG: 20 INJECTION, SOLUTION EPIDURAL; INFILTRATION; INTRACAUDAL; PERINEURAL at 16:41

## 2023-06-02 RX ADMIN — PROPOFOL 75 MG: 10 INJECTION, EMULSION INTRAVENOUS at 16:42

## 2023-06-02 RX ADMIN — SODIUM CHLORIDE: 9 INJECTION, SOLUTION INTRAVENOUS at 16:18

## 2023-06-02 RX ADMIN — PROPOFOL 25 MG: 10 INJECTION, EMULSION INTRAVENOUS at 16:48

## 2023-06-02 RX ADMIN — PROPOFOL 25 MG: 10 INJECTION, EMULSION INTRAVENOUS at 16:53

## 2023-06-02 RX ADMIN — PROPOFOL 25 MG: 10 INJECTION, EMULSION INTRAVENOUS at 16:50

## 2023-06-02 ASSESSMENT — PAIN SCALES - GENERAL
PAINLEVEL_OUTOF10: 0

## 2023-06-02 NOTE — DISCHARGE INSTRUCTIONS
Noah Hannonkimber  [id number]  1945    COLON DISCHARGE INSTRUCTIONS    Discomfort:  Redness at IV site- apply warm compress to area; if redness or soreness persist- contact your physician  There may be a slight amount of blood passed from the rectum  Gaseous discomfort- walking, belching will help relieve any discomfort  You should not operate a vehicle for 12 hours  You should not engage in an occupation involving machinery or appliances for rest of today  You may not drink alcoholic beverages for at least 12 hours  Avoid making any critical decisions for at least 24 hour  DIET:   Regular Diet   - however -  remember your colon is empty and a heavy meal will produce gas. Avoid these foods:  vegetables, fried / greasy foods, carbonated drinks for today     ACTIVITY:  You may resume your normal daily activities it is recommended that you spend the remainder of the day resting -  avoid any strenuous activity. CALL M.D. ANY SIGN OF:   Increasing pain, nausea, vomiting  Abdominal distension (swelling)  New increased bleeding (oral or rectal)  Fever (chills)  Pain in chest area  Bloody discharge from nose or mouth  Shortness of breath      Follow-up Instructions:  No further colonoscopies needed.   Follow up with [facility]                          Noah Tate  [id number]  1945        DISCHARGE SUMMARY from Nurse    The following personal items collected during your admission are returned to you:   Dental Appliance:    Vision:    Hearing Aid:    Jewelry:    Clothing:    Other Valuables:    Valuables sent to safe: Dose (mL/hr) Propofol : *25 mg    [unfilled]             DISCHARGE SUMMARY from Nurse    PATIENT INSTRUCTIONS:    After general anesthesia or intravenous sedation, for 24 hours or while taking prescription Narcotics:  Limit your activities  Do not drive and operate hazardous machinery  Do not make important personal or business decisions  Do  not drink alcoholic beverages  If you have not urinated

## 2023-06-02 NOTE — PERIOP NOTE
Face to face Discharged  instruction given to family and agreed with the plan. Discharged includes diet, activity limitations , medication to continue . D/c to home in stable condition with care of family.

## 2023-06-02 NOTE — H&P
Screening. History of Present Illness  1. Colon Cancer Screening     Problem List  Problem List reviewed.    Problem Description  Onset Date  Chronic  Clinical Status  Notes  Osteoporosis  2014  Y      Arthropathy  2014  Y      Hyperlipidemia  2011  Y      Neoplasm of uncertain behavior of skin  2012  Y      Gastroesophageal reflux disease  2011  Y      Anxiety disorder  2011  Y      Disorder of bone and articular cartilage  2011  Y      Insomnia  2011  Y          Interim History  Type  Reason  Management  Date  Admit  Admit from  Discharge  Discharge to  Outcome    LT thyroid u/s guided needle aspiration  negative for malignancy  2015              left thyroid nodule  thyroid ultrasound, guided needle biopsy  2015          free of disease  Type  Facility  Discharge Dx1  Discharge Dx2  Discharge Dx3  Discharge Dx4  Comment                            negative for malignancy   see scanned document    Past Medical/Surgical History   (Detailed)  Disease/disorder  Onset Date  Management  Date  Comments      cysto, bilateral RPG with interpretation, left ureteroscopy with laser lithotripsy of renal stone and left stent placement, right ureteroscopy with laser lithotripsy of ureteral stone and right stent  2022        Cysto, Right RPG with interpretation, right ureteroscopy with laser lithotripsy and stent  2021        Hysterectomy, total  2017        D&C  2017        trigger finger surgery          Cholecystectomy          Appendectomy          uterine suspension          Arthroscopy, knee          cataract extraction and lens implantation      Allergies          GERD          Hx Renal Calculi          Hyperlipidemia          KUB 13 ([facility])          Osteoarthritis          Post Menopausal          Urolithiasis              lithotripsy feb 15            Family History   (Detailed)    Relationship  Family Member Name    Age

## 2023-06-02 NOTE — PROCEDURES
Colonoscopy Procedure Note    Indications: Previous adenomatous polyp    Anesthesia/Sedation: MAC anesthesia Propofol    Pre-Procedure Exam:  Airway: clear   Heart: normal S1and S2    Lungs: clear bilateral  Abdomen: soft, nontender, bowel sounds present and normal in all quadrants   Mental Status: awake, alert, and oriented to person, place, and time      Procedure in Detail:  Informed consent was obtained for the procedure, including sedation. Risks of perforation, hemorrhage, adverse drug reaction, and aspiration were discussed. The patient was placed in the left lateral decubitus position. Based on the pre-procedure assessment, including review of the patient's medical history, medications, allergies, and review of systems, she had been deemed to be an appropriate candidate for moderate sedation; she was therefore sedated with the medications listed above. The patient was monitored continuously with ECG tracing, pulse oximetry, blood pressure monitoring, and direct observations. A rectal examination was performed. The [id number] was inserted into the rectum and advanced under direct vision to the cecum, which was identified by the ileocecal valve and appendiceal orifice. The quality of the colonic preparation was excellent. A careful inspection was made as the colonoscope was withdrawn, including a retroflexed view of the rectum; findings and interventions are described below. Appropriate photodocumentation was obtained. ANUS: Anal exam reveals no masses or hemorrhoids, sphincter tone is normal.   RECTUM: Rectal exam reveals no masses or hemorrhoids. SIGMOID COLON: The mucosa is normal with good vascular pattern and without ulcers, diverticula, and polyps. DESCENDING COLON: The mucosa is normal with good vascular pattern and without ulcers, diverticula, and polyps.    SPLENIC FLEXURE: The splenic flexure is normal.   TRANSVERSE COLON: The mucosa is normal with good vascular pattern and without

## 2023-06-02 NOTE — ANESTHESIA PRE PROCEDURE
Department of Anesthesiology  Preprocedure Note       Name:  Melissa Garcia   Age:  78 y.o.  :  1945                                          MRN:  [id number]         Date:  2023      Surgeon: Federico Fagan):  Eleonora Celaya MD    Procedure: Procedure(s):  COLONOSCOPY    Medications prior to admission:   Prior to Admission medications    Medication Sig Start Date End Date Taking? Authorizing Provider   atorvastatin (LIPITOR) 10 MG tablet Take 10 mg by mouth    Ar Automatic Reconciliation   azelastine HCl 0.15 % SOLN 1 spray by Nasal route daily    Ar Automatic Reconciliation   butalbital-aspirin-caffeine (FIORINAL) -40 MG capsule Take 1 capsule by mouth every 4 hours as needed. Ar Automatic Reconciliation   Cetirizine HCl 10 MG CAPS Take 10 mg by mouth    Ar Automatic Reconciliation   clonazePAM (KLONOPIN) 1 MG tablet Take 1 mg by mouth. Ar Automatic Reconciliation   cyclobenzaprine (FLEXERIL) 5 MG tablet ceived the following from [facility]: Outside name: cyclobenzaprine (FLEXERIL) 5 mg tablet 21   Ar Automatic Reconciliation   dicyclomine (BENTYL) 20 MG tablet Take 20 mg by mouth 3 times daily    Ar Automatic Reconciliation   dilTIAZem (CARDIZEM LA) 120 MG TB24 extended release tablet Take 120 mg by mouth    Ar Automatic Reconciliation   estradiol (ESTRACE) 0.1 MG/GM vaginal cream Apply  Small pea  Sized  Dab to vulvar/ vaginal   Area 3x/  Week  At  Bed time 22   Ar Automatic Reconciliation   eszopiclone (LUNESTA) 2 MG TABS Take 2 mg by mouth.     Ar Automatic Reconciliation   famotidine (PEPCID) 20 MG tablet Take 20 mg by mouth daily    Ar Automatic Reconciliation   fluticasone (FLONASE) 50 MCG/ACT nasal spray 2 sprays by Nasal route as needed    Ar Automatic Reconciliation   magnesium oxide (MAG-OX) 400 MG tablet Take 400 mg by mouth 2 times daily    Ar Automatic Reconciliation   methocarbamol (ROBAXIN) 500 MG tablet Take 500 mg by mouth as needed 22   Ar

## 2023-06-05 NOTE — ANESTHESIA POSTPROCEDURE EVALUATION
Post-Anesthesia Evaluation and Assessment    Cardiovascular Function/Vital Signs  Visit Vitals  BP (!) 127/58   Pulse 62   Temp 97.7 °F (36.5 °C) (Oral)   Resp 16   Ht 5' 6\" (1.676 m)   Wt 146 lb (66.2 kg)   SpO2 98%   BMI 23.57 kg/m²       Patient is status post Procedure(s):  COLONOSCOPY. Nausea/Vomiting: Controlled. Postoperative hydration reviewed and adequate. Pain:      Managed. Neurological Status: At baseline. Mental Status and Level of Consciousness: Progressing to baseline appropriately     Pulmonary Status:       Adequate oxygenation and airway patent. Complications related to anesthesia: None    Post-anesthesia assessment completed. No concerns.         Signed By: Jonas Garcia MD    June 5, 2023 (late entry)

## 2023-07-12 ENCOUNTER — HOSPITAL ENCOUNTER (OUTPATIENT)
Facility: HOSPITAL | Age: 78
Discharge: HOME | End: 2023-07-15
Attending: INTERNAL MEDICINE
Payer: MEDICARE

## 2023-07-12 DIAGNOSIS — R13.10 DYSPHAGIA, UNSPECIFIED TYPE: ICD-10-CM

## 2023-07-12 PROCEDURE — 6370000000 HC RX 637 (ALT 250 FOR IP): Performed by: INTERNAL MEDICINE

## 2023-07-12 PROCEDURE — 74220 X-RAY XM ESOPHAGUS 1CNTRST: CPT

## 2023-07-12 PROCEDURE — 2500000003 HC RX 250 WO HCPCS: Performed by: INTERNAL MEDICINE

## 2023-07-12 RX ADMIN — BARIUM SULFATE 135 ML: 980 POWDER, FOR SUSPENSION ORAL at 11:38

## 2023-07-12 RX ADMIN — ANTACID/ANTIFLATULENT 1 EACH: 380; 550; 10; 10 GRANULE, EFFERVESCENT ORAL at 11:40

## 2023-07-12 RX ADMIN — BARIUM SULFATE 1 TABLET: 700 TABLET ORAL at 11:40

## 2023-07-12 RX ADMIN — BARIUM SULFATE 176 ML: 960 POWDER, FOR SUSPENSION ORAL at 11:37

## 2023-08-27 ENCOUNTER — APPOINTMENT (OUTPATIENT)
Facility: HOSPITAL | Age: 78
End: 2023-08-27
Payer: MEDICARE

## 2023-08-27 ENCOUNTER — HOSPITAL ENCOUNTER (EMERGENCY)
Facility: HOSPITAL | Age: 78
Discharge: HOME | End: 2023-08-27
Payer: MEDICARE

## 2023-08-27 VITALS
DIASTOLIC BLOOD PRESSURE: 68 MMHG | OXYGEN SATURATION: 99 % | HEIGHT: 66 IN | BODY MASS INDEX: 23.63 KG/M2 | SYSTOLIC BLOOD PRESSURE: 122 MMHG | TEMPERATURE: 97.2 F | WEIGHT: 147 LBS | RESPIRATION RATE: 16 BRPM | HEART RATE: 67 BPM
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
## 2023-08-27 DIAGNOSIS — S80.01XA CONTUSION OF RIGHT KNEE, INITIAL ENCOUNTER: ICD-10-CM

## 2023-08-27 PROCEDURE — 99283 EMERGENCY DEPT VISIT LOW MDM: CPT

## 2023-08-27 PROCEDURE — 6370000000 HC RX 637 (ALT 250 FOR IP): Performed by: PHYSICIAN ASSISTANT

## 2023-08-27 PROCEDURE — 73562 X-RAY EXAM OF KNEE 3: CPT

## 2023-08-27 PROCEDURE — 73502 X-RAY EXAM HIP UNI 2-3 VIEWS: CPT

## 2023-08-27 RX ORDER — TRAMADOL HYDROCHLORIDE 50 MG/1
50 TABLET ORAL
Status: COMPLETED | OUTPATIENT
Start: 2023-08-27 | End: 2023-08-27

## 2023-08-27 RX ORDER — TRAMADOL HYDROCHLORIDE 50 MG/1
50 TABLET ORAL EVERY 4 HOURS PRN
Qty: 10 TABLET | Refills: 0 | Status: SHIPPED | OUTPATIENT
Start: 2023-08-27 | End: 2023-08-30

## 2023-08-27 RX ADMIN — TRAMADOL HYDROCHLORIDE 50 MG: 50 TABLET, COATED ORAL at 17:28

## 2023-08-27 RX ADMIN — TRAMADOL HYDROCHLORIDE 50 MG: 50 TABLET, COATED ORAL at 15:34

## 2023-08-27 ASSESSMENT — PAIN SCALES - GENERAL
PAINLEVEL_OUTOF10: 9
PAINLEVEL_OUTOF10: 10

## 2023-08-27 NOTE — ED TRIAGE NOTES
Pt arrived with c/o right knee pain/injury following a fall. Pt foot slipped out of her sandal causing her to fall and land on her right knee. Pt states she did not hit her head. Pt has hx of afib and takes xarelto. There is no deformity to the extremity. Pt in NAD att.

## 2023-08-27 NOTE — ED PROVIDER NOTES
EMERGENCY DEPARTMENT HISTORY AND PHYSICAL EXAM      Patient Name: Doron Cortez  MRN: [id number]  YOB: 1945  Provider: MARISELA Colon  PCP: Brigette Jain MD   Time/Date of evaluation: 2:13 PM EDT on 8/27/23    History of Presenting Illness     Chief Complaint   Patient presents with    Knee Injury       History Provided By: Patient     History Claudetta Brick): Doron Cortez is a 78 y.o. female with a PMHX of C. difficile gallstones GERD TIA A-fib  who presents to the emergency department  by POV C/O right knee pain after she fell while at [employer]. States she tripped over her shoe and landed directly onto the right knee. She has not been able to bear weight due to pain. While she was in the ED she states she started developing right lower back pain. No head injury or loss consciousness.   She does take Xarelto for A-fib         Past History     Past Medical History:  Past Medical History:   Diagnosis Date    Atrial fibrillation ([address]) 2017    Cataract     Chronic kidney disease     kidney stones    Chronic pain     from kidney stones    Clostridium difficile infection 2017    Gall stones     GERD (gastroesophageal reflux disease)     Hay fever     Hiatal hernia with gastroesophageal reflux disease and esophagitis     Hyperlipidemia     Hypertension 2015    Ill-defined condition     kidney stones    Kidney stones     bi-lateral    Migraine     Nausea & vomiting     OA (osteoarthritis)     Psychiatric disorder     anxiety    Recurrent UTI     Sinusitis     Stroke ([address])     TIA several years ago ritght side weakness    Thyroid disease     thyroid nodules       Past Surgical History:  Past Surgical History:   Procedure Laterality Date    APPENDECTOMY      CATARACT REMOVAL Bilateral     CHOLECYSTECTOMY      COLONOSCOPY N/A 6/2/2023    COLONOSCOPY performed by Najma Sparks MD at [address]    uterine suspension    HYSTERECTOMY (CERVIX STATUS

## 2024-05-17 NOTE — CALL BACK NOTE
Vicki Mcginnis from Dr Irma Jimenez office returned call, OK per Dr Alanna Burgos to proceed with using Clindamycin and Gentamycin , Verbal readback, OK per Dr Alanna Burgos to use Clindamycin and Gentamycin. 4 4 4 4 4 4 4 4 4 4 4 4

## 2025-02-04 ENCOUNTER — HOSPITAL ENCOUNTER (EMERGENCY)
Facility: HOSPITAL | Age: 80
Discharge: HOME | End: 2025-02-04
Attending: STUDENT IN AN ORGANIZED HEALTH CARE EDUCATION/TRAINING PROGRAM
Payer: MEDICARE

## 2025-02-04 VITALS
HEIGHT: 65 IN | BODY MASS INDEX: 24.16 KG/M2 | DIASTOLIC BLOOD PRESSURE: 58 MMHG | WEIGHT: 145 LBS | RESPIRATION RATE: 18 BRPM | HEART RATE: 80 BPM | SYSTOLIC BLOOD PRESSURE: 117 MMHG | OXYGEN SATURATION: 100 % | TEMPERATURE: 97.7 F

## 2025-02-04 DIAGNOSIS — R19.7 DIARRHEA OF PRESUMED INFECTIOUS ORIGIN: Primary | ICD-10-CM

## 2025-02-04 LAB
ALBUMIN SERPL-MCNC: 4.1 G/DL (ref 3.4–5)
ALBUMIN/GLOB SERPL: 1.2 (ref 0.8–1.7)
ALP SERPL-CCNC: 125 U/L (ref 45–117)
ALT SERPL-CCNC: 37 U/L (ref 13–56)
ANION GAP SERPL CALC-SCNC: 2 MMOL/L (ref 3–18)
AST SERPL-CCNC: 24 U/L (ref 10–38)
BASOPHILS # BLD: 0.04 K/UL (ref 0–0.1)
BASOPHILS NFR BLD: 0.7 % (ref 0–2)
BILIRUB SERPL-MCNC: 0.8 MG/DL (ref 0.2–1)
BUN SERPL-MCNC: 11 MG/DL (ref 7–18)
BUN/CREAT SERPL: 12 (ref 12–20)
CALCIUM SERPL-MCNC: 10.5 MG/DL (ref 8.5–10.1)
CHLORIDE SERPL-SCNC: 103 MMOL/L (ref 100–111)
CO2 SERPL-SCNC: 32 MMOL/L (ref 21–32)
CREAT SERPL-MCNC: 0.9 MG/DL (ref 0.6–1.3)
DIFFERENTIAL METHOD BLD: ABNORMAL
EOSINOPHIL # BLD: 0.1 K/UL (ref 0–0.4)
EOSINOPHIL NFR BLD: 1.7 % (ref 0–5)
ERYTHROCYTE [DISTWIDTH] IN BLOOD BY AUTOMATED COUNT: 11.9 % (ref 11.6–14.5)
GLOBULIN SER CALC-MCNC: 3.3 G/DL (ref 2–4)
GLUCOSE SERPL-MCNC: 117 MG/DL (ref 74–99)
HCT VFR BLD AUTO: 40.8 % (ref 35–45)
HGB BLD-MCNC: 14 G/DL (ref 12–16)
IMM GRANULOCYTES # BLD AUTO: 0.01 K/UL (ref 0–0.04)
IMM GRANULOCYTES NFR BLD AUTO: 0.2 % (ref 0–0.5)
LYMPHOCYTES # BLD: 1.96 K/UL (ref 0.9–3.3)
LYMPHOCYTES NFR BLD: 33.9 % (ref 21–52)
MCH RBC QN AUTO: 33.4 PG (ref 24–34)
MCHC RBC AUTO-ENTMCNC: 34.3 G/DL (ref 31–37)
MCV RBC AUTO: 97.4 FL (ref 78–100)
MONOCYTES # BLD: 0.49 K/UL (ref 0.05–1.2)
MONOCYTES NFR BLD: 8.5 % (ref 3–10)
NEUTS SEG # BLD: 3.18 K/UL (ref 1.8–8)
NEUTS SEG NFR BLD: 55 % (ref 40–73)
NRBC # BLD: 0 K/UL (ref 0–0.01)
NRBC BLD-RTO: 0 PER 100 WBC
PLATELET # BLD AUTO: 249 K/UL (ref 135–420)
PMV BLD AUTO: 9.6 FL (ref 9.2–11.8)
POTASSIUM SERPL-SCNC: 3.9 MMOL/L (ref 3.5–5.5)
PROT SERPL-MCNC: 7.4 G/DL (ref 6.4–8.2)
RBC # BLD AUTO: 4.19 M/UL (ref 4.2–5.3)
SODIUM SERPL-SCNC: 137 MMOL/L (ref 136–145)
WBC # BLD AUTO: 5.8 K/UL (ref 4.6–13.2)

## 2025-02-04 PROCEDURE — 2580000003 HC RX 258: Performed by: STUDENT IN AN ORGANIZED HEALTH CARE EDUCATION/TRAINING PROGRAM

## 2025-02-04 PROCEDURE — 87449 NOS EACH ORGANISM AG IA: CPT

## 2025-02-04 PROCEDURE — 87324 CLOSTRIDIUM AG IA: CPT

## 2025-02-04 PROCEDURE — 80053 COMPREHEN METABOLIC PANEL: CPT

## 2025-02-04 PROCEDURE — 85025 COMPLETE CBC W/AUTO DIFF WBC: CPT

## 2025-02-04 PROCEDURE — 99284 EMERGENCY DEPT VISIT MOD MDM: CPT

## 2025-02-04 RX ORDER — 0.9 % SODIUM CHLORIDE 0.9 %
1000 INTRAVENOUS SOLUTION INTRAVENOUS ONCE
Status: COMPLETED | OUTPATIENT
Start: 2025-02-04 | End: 2025-02-04

## 2025-02-04 RX ADMIN — SODIUM CHLORIDE 1000 ML: 900 INJECTION, SOLUTION INTRAVENOUS at 19:02

## 2025-02-04 ASSESSMENT — ENCOUNTER SYMPTOMS
SHORTNESS OF BREATH: 0
ABDOMINAL PAIN: 0
VOMITING: 0
DIARRHEA: 1
CHEST TIGHTNESS: 0
NAUSEA: 0

## 2025-02-04 NOTE — ED TRIAGE NOTES
Patient ambulatory to ED for concerns of possible c diff. Patient reports that she has hx of very frequent UTIs and has a host allergies. Patient was recently prescribed Fosfomycin and since then has had a couple watery stools. Patient reports that she has hx of c diff in the past, she was just seen at [facility] yesterday and was asked to provide a urine sample

## 2025-02-05 LAB
C DIFF GDH STL QL: NEGATIVE
C DIFF TOX A+B STL QL IA: NEGATIVE
C DIFF TOXIN INTERPRETATION: NORMAL

## (undated) DEVICE — STRAP,POSITIONING,KNEE/BODY,FOAM,4X60": Brand: MEDLINE

## (undated) DEVICE — INTENDED FOR TISSUE SEPARATION, AND OTHER PROCEDURES THAT REQUIRE A SHARP SURGICAL BLADE TO PUNCTURE OR CUT.: Brand: BARD-PARKER ® CARBON RIB-BACK BLADES

## (undated) DEVICE — DEVON™ KNEE AND BODY STRAP 60" X 3" (1.5 M X 7.6 CM): Brand: DEVON

## (undated) DEVICE — SEAL INSTR CYSTO ADJ BX PRT SEAL FOR ACC UP TO 6FR

## (undated) DEVICE — (D)STRIP SKN CLSR 0.5X4IN WHT --

## (undated) DEVICE — BAG PRESSURE INFUSION 3 W STOPCOCK 3000 CC PREMIERPRO DISP

## (undated) DEVICE — SYRINGE MED 20ML STD CLR PLAS LUERLOCK TIP N CTRL DISP

## (undated) DEVICE — PREP SKN CHLRAPRP 26ML TNT -- CONVERT TO ITEM 373320

## (undated) DEVICE — GARMENT,MEDLINE,DVT,INT,CALF,MED, GEN2: Brand: MEDLINE

## (undated) DEVICE — DUAL LUMEN URETERAL CATHETER

## (undated) DEVICE — SEAL ENDOSCP INSTR 7FR BX SELF SEAL

## (undated) DEVICE — NEEDLE HYPO 22GA L1.5IN BLK S STL HUB POLYPR SHLD REG BVL

## (undated) DEVICE — DRAPE XR C ARM 41X74IN LF --

## (undated) DEVICE — VISUALIZATION SYSTEM: Brand: CLEARIFY

## (undated) DEVICE — GDWIRE 3CM FLX-TIP 0.038X150CM -- BX/5 SENSOR

## (undated) DEVICE — TRAP MUCUS SPECIMEN 40ML -- MEDICHOICE

## (undated) DEVICE — TIP IU L8CM DIA6.7MM BLU SIL FLX DISP RUMI II

## (undated) DEVICE — CATHETER URET L70CM OD6FR OPN END FLEXIMA

## (undated) DEVICE — DRAPE,UTILITY,XL,4/PK,STERILE: Brand: MEDLINE

## (undated) DEVICE — SUT VCRL + 0 36IN UR6 VIO --

## (undated) DEVICE — MATERNITY PAD,HEAVY: Brand: CURITY

## (undated) DEVICE — CYSTO PACK: Brand: MEDLINE INDUSTRIES, INC.

## (undated) DEVICE — SOLUTION IRRIG 3000ML 0.9% SOD CHL FLX CONT 0797208] ICU MEDICAL INC]

## (undated) DEVICE — NITINOL STONE RETRIEVAL BASKET: Brand: ZERO TIP

## (undated) DEVICE — TRI-LUMEN FILTERED TUBE SET WITH ACTIVATED CHARCOAL FILTER: Brand: AIRSEAL

## (undated) DEVICE — SUT SLK 3-0 30IN SH BLK --

## (undated) DEVICE — STERILE POLYISOPRENE POWDER-FREE SURGICAL GLOVES: Brand: PROTEXIS

## (undated) DEVICE — AIRLIFE™ NASAL OXYGEN CANNULA CURVED, FLARED TIP WITH 14 FOOT (4.3 M) CRUSH-RESISTANT TUBING, OVER-THE-EAR STYLE: Brand: AIRLIFE™

## (undated) DEVICE — SUT MONOCRYL PLUS UD 4-0 --

## (undated) DEVICE — WRISTBAND ID AD W2.5XL9.5CM RED VYN ADH CLSR UNI-PRINT

## (undated) DEVICE — SYRINGE MED 5ML STD CLR PLAS LUERLOCK TIP N CTRL DISP

## (undated) DEVICE — BLUNTFILL: Brand: MONOJECT

## (undated) DEVICE — (D)PACK LAP CHOLE MIH -- DISC BY MFR USE ITEM 338834

## (undated) DEVICE — CATHETER PH SUCT 14FR

## (undated) DEVICE — GLOVE ORANGE PI 7 1/2   MSG9075

## (undated) DEVICE — TROCAR LAP L100MM DIA5MM BLDELSS W/ STBL SL ENDOPATH XCEL

## (undated) DEVICE — SET ADMIN 16ML TBNG L100IN 2 Y INJ SITE IV PIGGY BK DISP (ORDER IN MULIPLES OF 48)

## (undated) DEVICE — URETERAL ACCESS SHEATH SET: Brand: NAVIGATOR HD

## (undated) DEVICE — TRAY PREP DRY W/ PREM GLV 2 APPL 6 SPNG 2 UNDPD 1 OVERWRAP

## (undated) DEVICE — SINGLE-USE DIGITAL FLEXIBLE URETEROSCOPE: Brand: LITHOVUE

## (undated) DEVICE — MASTISOL ADHESIVE LIQ 2/3ML

## (undated) DEVICE — SYRINGE 50ML E/T

## (undated) DEVICE — TOURNIQUET PHLEB W1XL18IN BLU FLAT RL AND BND REUSE FOR IV

## (undated) DEVICE — SOL IRR STRL H2O 1500ML BTL --

## (undated) DEVICE — KENDALL RADIOLUCENT FOAM MONITORING ELECTRODE RECTANGULAR SHAPE: Brand: KENDALL

## (undated) DEVICE — LIGHT HANDLE: Brand: DEVON

## (undated) DEVICE — Device

## (undated) DEVICE — CATH URET AXXCESS 6FRX70CM -- BX/10

## (undated) DEVICE — REM POLYHESIVE ADULT PATIENT RETURN ELECTRODE: Brand: VALLEYLAB

## (undated) DEVICE — AIRSEAL 5 MM ACCESS PORT AND LOW PROFILE OBTURATOR WITH BLADELESS OPTICAL TIP, 100 MM LENGTH: Brand: AIRSEAL

## (undated) DEVICE — 40580 - THE PINK PAD - ADVANCED TRENDELENBURG POSITIONING KIT: Brand: 40580 - THE PINK PAD - ADVANCED TRENDELENBURG POSITIONING KIT

## (undated) DEVICE — SPONGE LAP 18X18IN STRL -- 5/PK

## (undated) DEVICE — SOLUTION IV 1000ML 20MEQ K CHL IN 0.9% SOD CHL FLX CONT

## (undated) DEVICE — CATHETER IV 22GA L1IN BLU POLYUR STR HUB RADPQ PROTCT +

## (undated) DEVICE — SHEET,DRAPE,70X85,STERILE: Brand: MEDLINE

## (undated) DEVICE — SYR 50ML LR LCK 1ML GRAD NSAF --

## (undated) DEVICE — (D)BNDG ADHESIVE FABRIC 3/4X3 -- DISC BY MFR USE ITEM 357960

## (undated) DEVICE — URETERAL ACCESS SHEATH SET: Brand: NAVIGATOR

## (undated) DEVICE — TROCARS: Brand: KII® BALLOON BLUNT TIP SYSTEM

## (undated) DEVICE — SOLUTION IRRIG 3000ML LAC R FLX CONT

## (undated) DEVICE — SLEEVE TRCR L100MM DIA5MM UNIV STBL FOR BLDELSS DIL TIP

## (undated) DEVICE — SUTURE PDS II SZ 2-0 L27IN ABSRB VLT L36MM CT-1 1/2 CIR Z339H

## (undated) DEVICE — SCAPEL RUMI II EFFICIENT 30 --

## (undated) DEVICE — SYRINGE MED 3ML CLR PLAS STD N CTRL LUERLOCK TIP DISP

## (undated) DEVICE — 4-PORT MANIFOLD: Brand: NEPTUNE 2

## (undated) DEVICE — CANNULA CUSH AD W/ 14FT TBG

## (undated) DEVICE — LEGGINGS, PAIR, 31X48, STERILE: Brand: MEDLINE

## (undated) DEVICE — DISPOSABLE SUCTION/IRRIGATOR TUBE SET WITH TIP: Brand: AHTO

## (undated) DEVICE — TUBING, SUCTION, 1/4" X 12', STRAIGHT: Brand: MEDLINE

## (undated) DEVICE — SHEAR HARMONIC ACET 5MMX36CM -- ACE PLUS

## (undated) DEVICE — Z INACTIVE USE 2527070 DRAPE SURG W40XL44IN UNDERBUTTOCK SMS POLYPR W/ PCH BK DISP

## (undated) DEVICE — CATHETERIZATION TRAY 16 FR FOL DRAINAGE BG LUBRI-SIL IC

## (undated) DEVICE — SINGLE PORT MANIFOLD: Brand: NEPTUNE 2